# Patient Record
Sex: MALE | Race: WHITE | NOT HISPANIC OR LATINO | Employment: OTHER | URBAN - METROPOLITAN AREA
[De-identification: names, ages, dates, MRNs, and addresses within clinical notes are randomized per-mention and may not be internally consistent; named-entity substitution may affect disease eponyms.]

---

## 2018-05-21 ENCOUNTER — INFUSION (OUTPATIENT)
Dept: INFUSION THERAPY | Facility: HOSPITAL | Age: 58
End: 2018-05-21
Attending: INTERNAL MEDICINE
Payer: COMMERCIAL

## 2018-05-21 ENCOUNTER — INITIAL CONSULT (OUTPATIENT)
Dept: HEMATOLOGY/ONCOLOGY | Facility: CLINIC | Age: 58
End: 2018-05-21
Payer: COMMERCIAL

## 2018-05-21 VITALS
HEART RATE: 64 BPM | TEMPERATURE: 98 F | SYSTOLIC BLOOD PRESSURE: 151 MMHG | WEIGHT: 243.81 LBS | OXYGEN SATURATION: 98 % | BODY MASS INDEX: 30.31 KG/M2 | HEIGHT: 75 IN | DIASTOLIC BLOOD PRESSURE: 91 MMHG

## 2018-05-21 DIAGNOSIS — D72.820 LYMPHOCYTOSIS: ICD-10-CM

## 2018-05-21 DIAGNOSIS — K40.90 NON-RECURRENT UNILATERAL INGUINAL HERNIA WITHOUT OBSTRUCTION OR GANGRENE: ICD-10-CM

## 2018-05-21 DIAGNOSIS — C91.10 CLL (CHRONIC LYMPHOCYTIC LEUKEMIA): Primary | ICD-10-CM

## 2018-05-21 DIAGNOSIS — D49.89 NEOPLASM OF ABDOMEN: ICD-10-CM

## 2018-05-21 LAB
ALBUMIN SERPL BCP-MCNC: 4.3 G/DL
ALP SERPL-CCNC: 43 U/L
ALT SERPL W/O P-5'-P-CCNC: 20 U/L
ANION GAP SERPL CALC-SCNC: 11 MMOL/L
AST SERPL-CCNC: 19 U/L
BASOPHILS # BLD AUTO: ABNORMAL K/UL
BASOPHILS NFR BLD: 0 %
BILIRUB SERPL-MCNC: 0.6 MG/DL
BUN SERPL-MCNC: 21 MG/DL
CALCIUM SERPL-MCNC: 9.3 MG/DL
CHLORIDE SERPL-SCNC: 107 MMOL/L
CO2 SERPL-SCNC: 22 MMOL/L
CREAT SERPL-MCNC: 1.2 MG/DL
DIFFERENTIAL METHOD: ABNORMAL
EOSINOPHIL # BLD AUTO: ABNORMAL K/UL
EOSINOPHIL NFR BLD: 0 %
ERYTHROCYTE [DISTWIDTH] IN BLOOD BY AUTOMATED COUNT: 13.7 %
EST. GFR  (AFRICAN AMERICAN): >60 ML/MIN/1.73 M^2
EST. GFR  (NON AFRICAN AMERICAN): >60 ML/MIN/1.73 M^2
GLUCOSE SERPL-MCNC: 100 MG/DL
HCT VFR BLD AUTO: 42.4 %
HGB BLD-MCNC: 14.2 G/DL
LDH SERPL L TO P-CCNC: 166 U/L
LYMPHOCYTES # BLD AUTO: ABNORMAL K/UL
LYMPHOCYTES NFR BLD: 84 %
MCH RBC QN AUTO: 29.6 PG
MCHC RBC AUTO-ENTMCNC: 33.5 G/DL
MCV RBC AUTO: 89 FL
MONOCYTES # BLD AUTO: ABNORMAL K/UL
MONOCYTES NFR BLD: 6 %
NEUTROPHILS NFR BLD: 10 %
PLATELET # BLD AUTO: 170 K/UL
PLATELET BLD QL SMEAR: ABNORMAL
PMV BLD AUTO: 11.1 FL
POTASSIUM SERPL-SCNC: 3.9 MMOL/L
PROT SERPL-MCNC: 6.6 G/DL
RBC # BLD AUTO: 4.79 M/UL
SMUDGE CELLS BLD QL SMEAR: PRESENT
SODIUM SERPL-SCNC: 140 MMOL/L
URATE SERPL-MCNC: 6.8 MG/DL
WBC # BLD AUTO: 60.8 K/UL

## 2018-05-21 PROCEDURE — 88185 FLOWCYTOMETRY/TC ADD-ON: CPT | Performed by: PATHOLOGY

## 2018-05-21 PROCEDURE — 83615 LACTATE (LD) (LDH) ENZYME: CPT | Mod: PO

## 2018-05-21 PROCEDURE — 36415 COLL VENOUS BLD VENIPUNCTURE: CPT | Mod: PO

## 2018-05-21 PROCEDURE — 80053 COMPREHEN METABOLIC PANEL: CPT | Mod: PO

## 2018-05-21 PROCEDURE — 85060 BLOOD SMEAR INTERPRETATION: CPT | Mod: ,,, | Performed by: PATHOLOGY

## 2018-05-21 PROCEDURE — 84550 ASSAY OF BLOOD/URIC ACID: CPT | Mod: PO

## 2018-05-21 PROCEDURE — 83010 ASSAY OF HAPTOGLOBIN QUANT: CPT

## 2018-05-21 PROCEDURE — 85027 COMPLETE CBC AUTOMATED: CPT | Mod: PO

## 2018-05-21 PROCEDURE — 88184 FLOWCYTOMETRY/ TC 1 MARKER: CPT | Performed by: PATHOLOGY

## 2018-05-21 PROCEDURE — 84165 PROTEIN E-PHORESIS SERUM: CPT

## 2018-05-21 PROCEDURE — 85007 BL SMEAR W/DIFF WBC COUNT: CPT | Mod: PO

## 2018-05-21 PROCEDURE — 99999 PR PBB SHADOW E&M-NEW PATIENT-LVL III: CPT | Mod: PBBFAC,,, | Performed by: INTERNAL MEDICINE

## 2018-05-21 PROCEDURE — 88189 FLOWCYTOMETRY/READ 16 & >: CPT | Mod: ,,, | Performed by: PATHOLOGY

## 2018-05-21 PROCEDURE — 99203 OFFICE O/P NEW LOW 30 MIN: CPT | Mod: S$GLB,,, | Performed by: INTERNAL MEDICINE

## 2018-05-21 NOTE — PROGRESS NOTES
Subjective:       Patient ID: Rocky Esquivel is a 58 y.o. male.    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 prior to inguinal hernia surgery of greater than 57,000 as well as all the patient urgently for further evaluation    Past Medical History:   Diagnosis Date    Allergy     as a child     Family History   Problem Relation Age of Onset    No Known Problems Mother     Colon cancer Father     No Known Problems Sister     No Known Problems Brother     No Known Problems Maternal Aunt     No Known Problems Maternal Uncle     No Known Problems Paternal Aunt     No Known Problems Paternal Uncle     Liver cancer Paternal Grandmother      Social History     Social History    Marital status:      Spouse name: N/A    Number of children: N/A    Years of education: N/A     Occupational History    Not on file.     Social History Main Topics    Smoking status: Former Smoker     Types: Cigarettes     Quit date: 2007    Smokeless tobacco: Not on file    Alcohol use 3.0 oz/week     3 Cans of beer, 2 Shots of liquor per week    Drug use: No    Sexual activity: Yes     Partners: Female     Birth control/ protection: See Surgical Hx     Other Topics Concern    Not on file     Social History Narrative    No narrative on file     Past Surgical History:   Procedure Laterality Date    COLONOSCOPY      2017    NASAL SEPTUM SURGERY  1988    TONSILLECTOMY      1972 approx    VASECTOMY  2003       Labs:  Lab Results   Component Value Date    WBC 60.80 (HH) 05/21/2018    HGB 14.2 05/21/2018    HCT 42.4 05/21/2018    MCV 89 05/21/2018     05/21/2018     BMP  No results found for: NA, K, CL, CO2, BUN, CREATININE, CALCIUM, ANIONGAP, ESTGFRAFRICA, EGFRNONAA  No results found for: ALT, AST, GGT, ALKPHOS, BILITOT    No results found for: IRON, TIBC, FERRITIN, SATURATEDIRO  No results found for: [id number]  No results found for:  FOLATE  No results found for: TSH      Review of Systems   Constitutional: Negative for activity change, appetite change, chills, diaphoresis, fatigue, fever and unexpected weight change.   HENT: Negative for congestion, dental problem, drooling, ear discharge, ear pain, facial swelling, hearing loss, mouth sores, nosebleeds, postnasal drip, rhinorrhea, sinus pressure, sneezing, sore throat, tinnitus, trouble swallowing and voice change.    Eyes: Negative for photophobia, pain, discharge, redness, itching and visual disturbance.   Respiratory: Negative for apnea, cough, choking, chest tightness, shortness of breath, wheezing and stridor.    Cardiovascular: Negative for chest pain, palpitations and leg swelling.   Gastrointestinal: Positive for abdominal distention and abdominal pain. Negative for anal bleeding, blood in stool, constipation, diarrhea, nausea, rectal pain and vomiting.   Endocrine: Negative for cold intolerance, heat intolerance, polydipsia, polyphagia and polyuria.   Genitourinary: Negative for decreased urine volume, difficulty urinating, discharge, dysuria, enuresis, flank pain, frequency, genital sores, hematuria, penile pain, penile swelling, scrotal swelling, testicular pain and urgency.   Musculoskeletal: Negative for arthralgias, back pain, gait problem, joint swelling, myalgias, neck pain and neck stiffness.   Skin: Negative for color change, pallor, rash and wound.   Allergic/Immunologic: Negative for environmental allergies, food allergies and immunocompromised state.   Neurological: Negative for dizziness, tremors, seizures, syncope, facial asymmetry, speech difficulty, weakness, light-headedness, numbness and headaches.   Hematological: Negative for adenopathy. Does not bruise/bleed easily.   Psychiatric/Behavioral: Negative for agitation, behavioral problems, confusion, decreased concentration, dysphoric mood, hallucinations, self-injury, sleep disturbance and suicidal ideas. The patient  is not nervous/anxious and is not hyperactive.        Objective:      Physical Exam   Constitutional: He is oriented to person, place, and time. He appears well-developed and well-nourished. He appears distressed.   HENT:   Head: Normocephalic.   Right Ear: Tympanic membrane and external ear normal.   Left Ear: Tympanic membrane and external ear normal.   Nose: Nose normal. Right sinus exhibits no maxillary sinus tenderness and no frontal sinus tenderness. Left sinus exhibits no maxillary sinus tenderness and no frontal sinus tenderness.   Mouth/Throat: Oropharynx is clear and moist. No oropharyngeal exudate.   Eyes: EOM and lids are normal. Pupils are equal, round, and reactive to light. Right eye exhibits no discharge. Left eye exhibits no discharge. Right conjunctiva is not injected. Right conjunctiva has no hemorrhage. Left conjunctiva is not injected. Left conjunctiva has no hemorrhage. No scleral icterus. Right eye exhibits normal extraocular motion. Left eye exhibits normal extraocular motion.   Neck: Normal range of motion. Neck supple. No JVD present. No tracheal deviation present. No thyromegaly present.   Cardiovascular: Normal rate, regular rhythm and normal heart sounds.    Pulmonary/Chest: Effort normal and breath sounds normal. No stridor. No respiratory distress.   Abdominal: Soft. Bowel sounds are normal. He exhibits distension. He exhibits no mass. There is no hepatosplenomegaly, splenomegaly or hepatomegaly. There is tenderness.   Spleen tip palpable   Musculoskeletal: Normal range of motion. He exhibits no edema or tenderness.   Lymphadenopathy:        Head (right side): No posterior auricular and no occipital adenopathy present.        Head (left side): No posterior auricular and no occipital adenopathy present.     He has no cervical adenopathy.        Right cervical: No superficial cervical, no deep cervical and no posterior cervical adenopathy present.       Left cervical: No superficial  cervical, no deep cervical and no posterior cervical adenopathy present.     He has no axillary adenopathy.        Right: No supraclavicular adenopathy present.        Left: No supraclavicular adenopathy present.   Neurological: He is alert and oriented to person, place, and time. He has normal strength. No cranial nerve deficit. Coordination normal.   Skin: Skin is dry. No rash noted. He is not diaphoretic. No cyanosis or erythema. Nails show no clubbing.   Psychiatric: His behavior is normal. Judgment and thought content normal. His mood appears anxious. Cognition and memory are normal. He exhibits a depressed mood.   Vitals reviewed.          Assessment:      1. CLL (chronic lymphocytic leukemia)    2. Neoplasm of abdomen    3. Non-recurrent unilateral inguinal hernia without obstruction or gangrene           Plan:   Repeat CBC white count greater than 60,080% lymphs high likelihood of chronic lymphocytic leukemia laboratory evaluation in progress.  Discussed with he and his wife by phone the diagnosis of resumption of CLL articles from up-to-date followed to them for their review I will see patient back after laboratory results return as well as CT chest abdomen pelvis for review talked about the possibility of cause of hernia may be from lymphadenopathy in retroperitoneum especially with palpable spleen

## 2018-05-22 LAB
HAPTOGLOB SERPL-MCNC: 79 MG/DL
PATH REV BLD -IMP: NORMAL

## 2018-05-22 PROCEDURE — 84165 PROTEIN E-PHORESIS SERUM: CPT | Mod: 26,,, | Performed by: PATHOLOGY

## 2018-05-23 ENCOUNTER — HOSPITAL ENCOUNTER (OUTPATIENT)
Dept: RADIOLOGY | Facility: HOSPITAL | Age: 58
Discharge: HOME | End: 2018-05-23
Attending: INTERNAL MEDICINE
Payer: COMMERCIAL

## 2018-05-23 ENCOUNTER — PATIENT MESSAGE (OUTPATIENT)
Dept: HEMATOLOGY/ONCOLOGY | Facility: CLINIC | Age: 58
End: 2018-05-23

## 2018-05-23 ENCOUNTER — OFFICE VISIT (OUTPATIENT)
Dept: HEMATOLOGY/ONCOLOGY | Facility: CLINIC | Age: 58
End: 2018-05-23
Payer: COMMERCIAL

## 2018-05-23 ENCOUNTER — INFUSION (OUTPATIENT)
Dept: INFUSION THERAPY | Facility: HOSPITAL | Age: 58
End: 2018-05-23
Attending: INTERNAL MEDICINE
Payer: COMMERCIAL

## 2018-05-23 ENCOUNTER — SOCIAL WORK (OUTPATIENT)
Dept: HEMATOLOGY/ONCOLOGY | Facility: CLINIC | Age: 58
End: 2018-05-23

## 2018-05-23 VITALS
SYSTOLIC BLOOD PRESSURE: 122 MMHG | WEIGHT: 237.63 LBS | BODY MASS INDEX: 29.55 KG/M2 | HEIGHT: 75 IN | OXYGEN SATURATION: 98 % | HEART RATE: 75 BPM | TEMPERATURE: 98 F | RESPIRATION RATE: 18 BRPM | DIASTOLIC BLOOD PRESSURE: 84 MMHG

## 2018-05-23 DIAGNOSIS — D49.89 NEOPLASM OF ABDOMEN: ICD-10-CM

## 2018-05-23 DIAGNOSIS — C91.10 CLL (CHRONIC LYMPHOCYTIC LEUKEMIA): Primary | ICD-10-CM

## 2018-05-23 DIAGNOSIS — C91.10 CLL (CHRONIC LYMPHOCYTIC LEUKEMIA): ICD-10-CM

## 2018-05-23 DIAGNOSIS — K40.90 NON-RECURRENT UNILATERAL INGUINAL HERNIA WITHOUT OBSTRUCTION OR GANGRENE: ICD-10-CM

## 2018-05-23 PROBLEM — D72.820 LYMPHOCYTOSIS: Status: RESOLVED | Noted: 2018-05-21 | Resolved: 2018-05-23

## 2018-05-23 LAB
ALBUMIN SERPL ELPH-MCNC: 4.48 G/DL
ALPHA1 GLOB SERPL ELPH-MCNC: 0.23 G/DL
ALPHA2 GLOB SERPL ELPH-MCNC: 0.51 G/DL
B-GLOBULIN SERPL ELPH-MCNC: 0.59 G/DL
FLOW CYTOMETRY ANTIBODIES ANALYZED - BLOOD: NORMAL
FLOW CYTOMETRY COMMENT - BLOOD: NORMAL
FLOW CYTOMETRY INTERPRETATION - BLOOD: NORMAL
GAMMA GLOB SERPL ELPH-MCNC: 0.7 G/DL
PATHOLOGIST INTERPRETATION SPE: NORMAL
PROT SERPL-MCNC: 6.5 G/DL

## 2018-05-23 PROCEDURE — 71260 CT THORAX DX C+: CPT | Mod: TC,PO

## 2018-05-23 PROCEDURE — 3008F BODY MASS INDEX DOCD: CPT | Mod: CPTII,S$GLB,, | Performed by: INTERNAL MEDICINE

## 2018-05-23 PROCEDURE — 99215 OFFICE O/P EST HI 40 MIN: CPT | Mod: S$GLB,,, | Performed by: INTERNAL MEDICINE

## 2018-05-23 PROCEDURE — 74178 CT ABD&PLV WO CNTR FLWD CNTR: CPT | Mod: TC,PO

## 2018-05-23 PROCEDURE — 25500020 PHARM REV CODE 255: Mod: PO | Performed by: INTERNAL MEDICINE

## 2018-05-23 PROCEDURE — 74178 CT ABD&PLV WO CNTR FLWD CNTR: CPT | Mod: 26,,, | Performed by: RADIOLOGY

## 2018-05-23 PROCEDURE — 36415 COLL VENOUS BLD VENIPUNCTURE: CPT | Mod: PO

## 2018-05-23 PROCEDURE — 88271 CYTOGENETICS DNA PROBE: CPT

## 2018-05-23 PROCEDURE — 99999 PR PBB SHADOW E&M-EST. PATIENT-LVL III: CPT | Mod: PBBFAC,,, | Performed by: INTERNAL MEDICINE

## 2018-05-23 PROCEDURE — 71260 CT THORAX DX C+: CPT | Mod: 26,,, | Performed by: RADIOLOGY

## 2018-05-23 RX ORDER — ZOLPIDEM TARTRATE 5 MG/1
5 TABLET ORAL NIGHTLY PRN
Qty: 30 TABLET | Refills: 0 | Status: SHIPPED | OUTPATIENT
Start: 2018-05-23 | End: 2018-06-29 | Stop reason: SDUPTHER

## 2018-05-23 RX ADMIN — IOHEXOL 30 ML: 350 INJECTION, SOLUTION INTRAVENOUS at 07:05

## 2018-05-23 RX ADMIN — IOHEXOL 100 ML: 350 INJECTION, SOLUTION INTRAVENOUS at 08:05

## 2018-05-23 NOTE — PROGRESS NOTES
SW met with pt and his wife after his visit with oncologist who recommended scheduling an appt with Dr. Rich. SW made introductions and mentioned she would meet them for further needs assessment when they return to the clinic. Pt was agreeable to a telepsych visit on the first Thursday 2:30 time slot. SW scheduled pt for next Thursday, May 31 at 2:30 pm. SW will plan to f/u with pt when he RTC.

## 2018-05-23 NOTE — PROGRESS NOTES
Subjective:       Patient ID: Rocky Esquivel is a 58 y.o. male.    Chief Complaint: Follow-up (Chronic lymphocytic leukemia) and Results    HPI 58-year-old male returns after CT chest abdomen pelvis and for review of laboratories for marked lymphocytosis    Past Medical History:   Diagnosis Date    Allergy     as a child     Family History   Problem Relation Age of Onset    No Known Problems Mother     Colon cancer Father     No Known Problems Sister     No Known Problems Brother     No Known Problems Maternal Aunt     No Known Problems Maternal Uncle     No Known Problems Paternal Aunt     No Known Problems Paternal Uncle     Liver cancer Paternal Grandmother      Social History     Social History    Marital status:      Spouse name: N/A    Number of children: N/A    Years of education: N/A     Occupational History    Not on file.     Social History Main Topics    Smoking status: Former Smoker     Types: Cigarettes     Quit date: 2007    Smokeless tobacco: Not on file    Alcohol use 3.0 oz/week     3 Cans of beer, 2 Shots of liquor per week    Drug use: No    Sexual activity: Yes     Partners: Female     Birth control/ protection: See Surgical Hx     Other Topics Concern    Not on file     Social History Narrative    No narrative on file     Past Surgical History:   Procedure Laterality Date    COLONOSCOPY      2017    NASAL SEPTUM SURGERY  1988    TONSILLECTOMY      1972 approx    VASECTOMY  2003       Labs:  Lab Results   Component Value Date    WBC 60.80 (HH) 05/21/2018    HGB 14.2 05/21/2018    HCT 42.4 05/21/2018    MCV 89 05/21/2018     05/21/2018     BMP  Lab Results   Component Value Date     05/21/2018    K 3.9 05/21/2018     05/21/2018    CO2 22 (L) 05/21/2018    BUN 21 (H) 05/21/2018    CREATININE 1.2 05/21/2018    CALCIUM 9.3 05/21/2018    ANIONGAP 11 05/21/2018    ESTGFRAFRICA >60 05/21/2018    EGFRNONAA >60 05/21/2018     Lab Results    Component Value Date    ALT 20 05/21/2018    AST 19 05/21/2018    ALKPHOS 43 (L) 05/21/2018    BILITOT 0.6 05/21/2018       No results found for: IRON, TIBC, FERRITIN, SATURATEDIRO  No results found for: [id number]  No results found for: FOLATE  No results found for: TSH      Review of Systems   Constitutional: Positive for activity change and fatigue. Negative for appetite change, chills, diaphoresis, fever and unexpected weight change.   HENT: Negative for congestion, dental problem, drooling, ear discharge, ear pain, facial swelling, hearing loss, mouth sores, nosebleeds, postnasal drip, rhinorrhea, sinus pressure, sneezing, sore throat, tinnitus, trouble swallowing and voice change.    Eyes: Negative for photophobia, pain, discharge, redness, itching and visual disturbance.   Respiratory: Negative for apnea, cough, choking, chest tightness, shortness of breath, wheezing and stridor.    Cardiovascular: Negative for chest pain, palpitations and leg swelling.   Gastrointestinal: Negative for abdominal distention, abdominal pain, anal bleeding, blood in stool, constipation, diarrhea, nausea, rectal pain and vomiting.   Endocrine: Negative for cold intolerance, heat intolerance, polydipsia, polyphagia and polyuria.   Genitourinary: Negative for decreased urine volume, difficulty urinating, discharge, dysuria, enuresis, flank pain, frequency, genital sores, hematuria, penile pain, penile swelling, scrotal swelling, testicular pain and urgency.   Musculoskeletal: Negative for arthralgias, back pain, gait problem, joint swelling, myalgias, neck pain and neck stiffness.   Skin: Negative for color change, pallor, rash and wound.   Allergic/Immunologic: Negative for environmental allergies, food allergies and immunocompromised state.   Neurological: Positive for weakness. Negative for dizziness, tremors, seizures, syncope, facial asymmetry, speech difficulty, light-headedness, numbness and headaches.   Hematological:  Negative for adenopathy. Does not bruise/bleed easily.   Psychiatric/Behavioral: Positive for dysphoric mood and sleep disturbance. Negative for agitation, behavioral problems, confusion, decreased concentration, hallucinations, self-injury and suicidal ideas. The patient is nervous/anxious. The patient is not hyperactive.        Objective:      Physical Exam   Constitutional: He is oriented to person, place, and time. He appears well-developed and well-nourished. He appears distressed.   HENT:   Head: Normocephalic.   Right Ear: External ear normal.   Left Ear: External ear normal.   Nose: Nose normal. Right sinus exhibits no maxillary sinus tenderness and no frontal sinus tenderness. Left sinus exhibits no maxillary sinus tenderness and no frontal sinus tenderness.   Mouth/Throat: Oropharynx is clear and moist. No oropharyngeal exudate.   Eyes: EOM and lids are normal. Pupils are equal, round, and reactive to light. Right eye exhibits no discharge. Left eye exhibits no discharge. Right conjunctiva is not injected. Right conjunctiva has no hemorrhage. Left conjunctiva is not injected. Left conjunctiva has no hemorrhage. No scleral icterus. Right eye exhibits normal extraocular motion. Left eye exhibits normal extraocular motion.   Neck: Normal range of motion. Neck supple. No JVD present. No tracheal deviation present. No thyromegaly present.   Cardiovascular: Normal rate, regular rhythm and normal heart sounds.    Pulmonary/Chest: Effort normal and breath sounds normal. No stridor. No respiratory distress.   Abdominal: Soft. Bowel sounds are normal. He exhibits no mass. There is no hepatosplenomegaly, splenomegaly or hepatomegaly. There is no tenderness.   Musculoskeletal: Normal range of motion. He exhibits no edema or tenderness.   Lymphadenopathy:        Head (right side): No posterior auricular and no occipital adenopathy present.        Head (left side): No posterior auricular and no occipital adenopathy  present.     He has no cervical adenopathy.        Right cervical: No superficial cervical, no deep cervical and no posterior cervical adenopathy present.       Left cervical: No superficial cervical, no deep cervical and no posterior cervical adenopathy present.     He has no axillary adenopathy.        Right: No supraclavicular adenopathy present.        Left: No supraclavicular adenopathy present.   Neurological: He is alert and oriented to person, place, and time. He has normal strength. No cranial nerve deficit. Coordination normal.   Skin: Skin is dry. No rash noted. He is not diaphoretic. No cyanosis or erythema. Nails show no clubbing.   Psychiatric: His behavior is normal. Judgment and thought content normal. His mood appears anxious. Cognition and memory are normal. He exhibits a depressed mood.   Vitals reviewed.          Assessment:      1. CLL (chronic lymphocytic leukemia)    2. Non-recurrent unilateral inguinal hernia without obstruction or gangrene           Plan:   Review of imaging studies demonstrates marked lymphadenopathy throughout his abdomen and marked splenomegaly.  The abnormalities noted in the lung and liver.  Incidental extensive conversation B-cell lymphoproliferative disorder consistent with CLL/small lymphocytic lymphoma reviewed reports result of CLL FISH pending.  At this point will see back after results CLL FISH for treatment recommendations but most likely will need some sort of treatment discussed possibility of travel to [facility] for 2nd opinion.  Important to have information from fish analysis to determine true recommendations to see whether not of research trials are open at the present time.  Ambulatory referral made  and patient will be scheduled for evaluation by psychologist.  Discussed implications with he and his wife present information from today followed by them as well. Psychosocial Distress screening score of Distress Score: 10 noted and  reviewed.  Ambulatory referral  discussed case with surgery Dr. Patricio Bernabe we will hold treatment for hernia and less clinically indicated low risk of incarceration

## 2018-05-23 NOTE — NURSING
Lab obtained from right AC, 2 patient identifiers obtained, labeled and sent to lab.  Adequate blood return noted.  Dressing changed via sterile technique. Site without redness, swelling or drainage noted.

## 2018-05-23 NOTE — PROGRESS NOTES
Distress Screening Results: Psychosocial Distress screening score of Distress Score: 10 {AMB ONC DISTRESS SCORE:76458}

## 2018-05-25 ENCOUNTER — PATIENT MESSAGE (OUTPATIENT)
Dept: HEMATOLOGY/ONCOLOGY | Facility: CLINIC | Age: 58
End: 2018-05-25

## 2018-05-30 ENCOUNTER — TELEPHONE (OUTPATIENT)
Dept: HEMATOLOGY/ONCOLOGY | Facility: CLINIC | Age: 58
End: 2018-05-30

## 2018-05-30 NOTE — TELEPHONE ENCOUNTER
Voiced understanding that results are not in and I rescheduled for Friday but may call before depending on results. Reminded of tele appt tomorrow at [facility]. Voiced understanding and gratitude.

## 2018-05-30 NOTE — TELEPHONE ENCOUNTER
----- Message from Rosy He sent at 5/30/2018 11:23 AM CDT -----  Contact: Pt  Please give pt a call at [phone] he was returning the nurse call.

## 2018-05-31 ENCOUNTER — TELEPHONE (OUTPATIENT)
Dept: HEMATOLOGY/ONCOLOGY | Facility: CLINIC | Age: 58
End: 2018-05-31

## 2018-05-31 ENCOUNTER — OFFICE VISIT (OUTPATIENT)
Dept: PSYCHIATRY | Facility: CLINIC | Age: 58
End: 2018-05-31
Payer: COMMERCIAL

## 2018-05-31 DIAGNOSIS — F51.02 ADJUSTMENT INSOMNIA: Primary | ICD-10-CM

## 2018-05-31 LAB
CLINICAL CYTOGENETICIST REVIEW: NORMAL
CLL GENE MUT ANL BLD/T: NORMAL
CLL, FISH ADDITIONAL INFORMATION: NORMAL
CLL, FISH DISCLAIMER: NORMAL
CLL, FISH RELEASED BY: NORMAL
CLL, FISH RESULT SUMMARY: NORMAL
CLL, FISH RESULT TABLE: NORMAL
FCLL REASON FOR REFERRAL: NORMAL
FCLL SPECIMEN: NORMAL
REF LAB TEST METHOD: NORMAL
SPECIMEN SOURCE: NORMAL

## 2018-05-31 PROCEDURE — 90791 PSYCH DIAGNOSTIC EVALUATION: CPT | Mod: GT,S$GLB,, | Performed by: PSYCHOLOGIST

## 2018-05-31 NOTE — PROGRESS NOTES
TELEMEDICINE PSYCHO-ONCOLOGY INTAKE    Consultation started: 5/31/2018 at 2:26 PM   The chief complaint leading to consultation is: adaptation to disease, insomnia, anxiety  The patient location is: [facility]cology     Diagnostic Interview - CPT 29631    Date: 5/31/2018  Site of Clinician: [facility]     Evaluation Length (direct face-to-face time):  30 minutes     Referral Source: Oncologist: José Manuel   PCP: Primary Doctor No    Clinical status of patient: Outpatient    Rocky Esquivel, a 58 y.o. male, seen for initial evaluation visit.  Met with patient.    Chief complaint/reason for encounter: adjustment to illness, anxiety and sleep    Medical/Surgical History:    Patient Active Problem List   Diagnosis    CLL (chronic lymphocytic leukemia)    Non-recurrent unilateral inguinal hernia    Adjustment insomnia       Health Behaviors:       ETOH Use: Yes (social and within NIAAA healthy use limits for age/gender)    Tobacco Use: No   Illicit Drug Use:  No     Prescription Misuse:No   Caffeine: minimal   Exercise:The patient maintains a regular, healthy exercise program.  (walking 1 hour 7x week, prior biking and weight lifting- pending approval by Dr. Georges)      Family History:   Psychiatric illness: Yes  (MGM- SZ, mother- VAN, daughter-bipolar)   Alcohol/Drug Abuse: No     Suicide: No      Past Psychiatric History:   Inpatient treatment: No     Outpatient treatment: Yes (prior marital therapy)    Prior substance abuse treatment: No     Suicide Attempts: No     Psychotropic Medications:    Current: Ambien; uses inderal for public speaking, occasional Xanax     Past:  none   Current medications below, allergies reviewed in chart.  Current Outpatient Prescriptions   Medication    zolpidem (AMBIEN) 5 MG Tab     No current facility-administered medications for this visit.         CAM Therapies: None     Screening: Depression: Denies  (Standardized depression screening used- PHQ-9= 5, does not  meet screener)     Claudia: Denies Psychosis: Denies    Panic Disorder: Denies    Social/specific phobia: Denies       Social situation/Stressors: Rocky Esquivel lives with his wife a 2 of his children in [place], [place].  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 Rocky Esquivel has been  1x (32 years) and has 4 children (18-28).  His spouse is Heena.  He has 4 siblings (3 sisters, 1 brother) with whom he occasionally spends time. The patient reports emotional support only from his wife.  He has a number of friends, but does not rely on them or his siblings for support.  Rocky Esquivel is a non-theist.  Rocky Esquivel's hobbies include gardening and travel.  Additional stressors:  None reported    Strengths:Steady employment, financial stability, Housing stability, Able to vocalize needs, Motivation, readiness for change, Setting and pursuing goals, hopes, dreams, aspirations, Resources - social, interpersonal, monetary and Vocational interests, hobbies and/or talents  Liabilities: Complicated medical illness    Current Evaluation:   Mental Status Exam: Rocky Esquivel arrived promptly for the assessment session. The patient was fully cooperative throughout the interview and was an adequate historian   Appearance: age appropriate,  casually dressed, well groomed  Behavior/Cooperation: friendly and cooperative  Speech:normal in rate, volume, and tone   Mood: euthymic  Affect: mood congruent, full range and appropriate  Thought Process: goal-directed, logical  Thought Content: normal, no suicidality, no homicidality, delusions, or paranoia;did not appear to be responding to internal stimuli during the interview.   Orientation: grossly intact  Memory: Grossly intact  Attention Span/Concentration: Attends to interview without distraction; reports subjective difficulty  Fund of Knowledge: average  Estimate of Intelligence: average from verbal skills  "and history  Cognition: grossly intact  Insight: patient has awareness of illness; good insight into own behavior and behavior of others  Judgment: the patient's behavior is adequate to circumstances    Distress Score    Distress Score: 7        Practical Problems Physical Problems                                                   Family Problems                                         Emotional Problems              Fears: Yes       Nervousness: Yes       Sadness: Yes      Worry: Yes        Sleep: Yes          Spiritual/Religions Concerns               Other Problems            MMSE:     Pain: 0    History of present illness: 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  Rocky Esquivel has adjusted to illness fairly well after an initial period of shock and extreme anxiety primarily through active coping strategies.  He is experiencing some lingering anxiety while he waits for his CLL FISH.  He reports some feelings of embarrassment ("didn't plan to be the defective one who got the illness").  He has engaged in appropriate information gathering. He reports he is "ready to start treatment." The patient has satisfactory family/friend support.  His support system is coping very well with the diagnosis. Illness related psychosocial stressors include absence from work.  The patient has an adequate and growing partnership with his [facility] onc[facility] treatment team. The patient reports the following barriers to cancer care: none. He is actively searching for medical and psychosocial resources.  He has changed his diet to "better combat the cancer."    Symptoms:   · Mood: depressed mood, insomnia, worthlessness/guilt and poor concentration ; no prior, no life interference;  PHQ-9= 5  · Anxiety: excessive anxiety/worry, restlessness/keyed up, irritability and muscle tension; prior anxiety about children, practice; not life interfering, anxieyt fading as he becomes adjusted to " diagnosis, remainder of anxiety centered around decision-making when CLL FISH comes back; history of limited symptom panic attacks when public speaking (uses inderal to control- no avoidance); VAN-7= 7  · Substance abuse: denied  · Cognitive functioning: denied  · Health behaviors: noncontributory  · Sleep: onset and maintenance problems since dx, decreasing already, good sleep last night without Ambien      Assessment - Diagnosis - Goals:       ICD-10-CM ICD-9-CM   1. Adjustment insomnia F51.02 307.41       Plan: no intervention required at the present time    Summary and Recommendations  Rocky Esquivel is a 58 y.o. male referred by Dr. Georges for psychological evaluation and treatment.  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  Mood protective strategies during cancer treatment were discussed.  Patient was given information about evidence-based cancer nutrition online webinars.  He was encouraged to continue with pleasant events scheduling and his typical exercise pattern.  Patient will alert medical team if anxiety/insomnia does not resolve with definitive treatment plan or if depressed mood/anxiety increase in the future.  There are no recommendations for psychological treatment at this time. The patient is aware of resources available should his needs change in the future.    Each patient to whom he or she provides medical services by telemedicine is:  (1) informed of the relationship between the physician and patient and the respective role of any other health care provider with respect to management of the patient; and (2) notified that he or she may decline to receive medical services by telemedicine and may withdraw from such care at any time      Gonzalo Brower, PhD  Clinical Psychologist  [place] License #[id number]

## 2018-05-31 NOTE — TELEPHONE ENCOUNTER
Tell him as soon as I get will call; none yet    Previous Messages         Attempted to contact the following pt. ap

## 2018-05-31 NOTE — LETTER
May 31, 2018        Shakeel Georges MD  [address] [address]             [facility]  [address] [facility] [address]  Phone: [phone]  Fax: [phone]   Patient: Rocky Esquivel   MR Number: [id number]   YOB: 1960   Date of Visit: 5/31/2018       Dear Dr. Georges:    Thank you for referring Rocky Esquivel to me for evaluation. Below are the relevant portions of my assessment and plan of care.     Rocky Esquivel is a 58 y.o. male referred by Dr. Georges for psychological evaluation and treatment.  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  Mood protective strategies during cancer treatment were discussed.  Patient was given information about evidence-based cancer nutrition online webinars.  He was encouraged to continue with pleasant events scheduling and his typical exercise pattern.  Patient will alert medical team if anxiety/insomnia does not resolve with definitive treatment plan or if depressed mood/anxiety increase in the future.  There are no recommendations for psychological treatment at this time. The patient is aware of resources available should his needs change in the future.     If you have questions, please do not hesitate to call me. I look forward to following Rocky along with you.    Sincerely,      Gonzalo Rich, PhD           CC  No Recipients

## 2018-06-01 ENCOUNTER — OFFICE VISIT (OUTPATIENT)
Dept: HEMATOLOGY/ONCOLOGY | Facility: CLINIC | Age: 58
End: 2018-06-01
Payer: COMMERCIAL

## 2018-06-01 VITALS
HEART RATE: 51 BPM | TEMPERATURE: 97 F | SYSTOLIC BLOOD PRESSURE: 120 MMHG | WEIGHT: 231.25 LBS | DIASTOLIC BLOOD PRESSURE: 73 MMHG | OXYGEN SATURATION: 96 % | HEIGHT: 75 IN | BODY MASS INDEX: 28.75 KG/M2

## 2018-06-01 DIAGNOSIS — C91.10 CLL (CHRONIC LYMPHOCYTIC LEUKEMIA): Primary | ICD-10-CM

## 2018-06-01 PROCEDURE — 99215 OFFICE O/P EST HI 40 MIN: CPT | Mod: S$GLB,,, | Performed by: INTERNAL MEDICINE

## 2018-06-01 PROCEDURE — 99999 PR PBB SHADOW E&M-EST. PATIENT-LVL III: CPT | Mod: PBBFAC,,, | Performed by: INTERNAL MEDICINE

## 2018-06-01 PROCEDURE — 3008F BODY MASS INDEX DOCD: CPT | Mod: CPTII,S$GLB,, | Performed by: INTERNAL MEDICINE

## 2018-06-01 RX ORDER — PROPRANOLOL HYDROCHLORIDE 40 MG/1
TABLET ORAL
COMMUNITY
Start: 2018-05-19 | End: 2018-06-29

## 2018-06-01 NOTE — PROGRESS NOTES
Subjective:       Patient ID: Rocky Esquivel is a 58 y.o. male.    Chief Complaint: Results and Leukemia    HPI 58-year-old male returns for review of CLL FISH profile recently diagnosed with CLL.  The patient has some abdominal discomfort recent diagnosis of direct right inguinal hernia.  Accompanied by patient's wife ECOG status 1     Past Medical History:   Diagnosis Date    Allergy     as a child    Anxiety     Therapy     marital therapy     Family History   Problem Relation Age of Onset    Anxiety disorder Mother     Colon cancer Father     No Known Problems Sister     No Known Problems Brother     No Known Problems Maternal Aunt     No Known Problems Maternal Uncle     No Known Problems Paternal Aunt     No Known Problems Paternal Uncle     Liver cancer Paternal Grandmother     Schizophrenia Maternal Grandmother     Bipolar disorder Daughter      Social History     Social History    Marital status:      Spouse name: Heena     Number of children: 4    Years of education: N/A     Occupational History    Not on file.     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, orthodontist     Past Surgical History:   Procedure Laterality Date    COLONOSCOPY      2017    NASAL SEPTUM SURGERY  1988    TONSILLECTOMY      1972 approx    VASECTOMY  2003       Labs:  Lab Results   Component Value Date    WBC 60.80 (HH) 05/21/2018    HGB 14.2 05/21/2018    HCT 42.4 05/21/2018    MCV 89 05/21/2018     05/21/2018     BMP  Lab Results   Component Value Date     05/21/2018    K 3.9 05/21/2018      05/21/2018    CO2 22 (L) 05/21/2018    BUN 21 (H) 05/21/2018    CREATININE 1.2 05/21/2018    CALCIUM 9.3 05/21/2018    ANIONGAP 11 05/21/2018    ESTGFRAFRICA >60 05/21/2018    EGFRNONAA >60 05/21/2018     Lab Results   Component Value Date    ALT 20 05/21/2018    AST 19 05/21/2018    ALKPHOS 43 (L) 05/21/2018    BILITOT 0.6 05/21/2018       No results found for: IRON, TIBC, FERRITIN, SATURATEDIRO  No results found for: IWHUMKQD51  No results found for: FOLATE  No results found for: TSH      Review of Systems   Constitutional: Negative for activity change, appetite change, chills, diaphoresis, fatigue, fever and unexpected weight change.   HENT: Negative for congestion, dental problem, drooling, ear discharge, ear pain, facial swelling, hearing loss, mouth sores, nosebleeds, postnasal drip, rhinorrhea, sinus pressure, sneezing, sore throat, tinnitus, trouble swallowing and voice change.    Eyes: Negative for photophobia, pain, discharge, redness, itching and visual disturbance.   Respiratory: Negative for apnea, cough, choking, chest tightness, shortness of breath, wheezing and stridor.    Cardiovascular: Negative for chest pain, palpitations and leg swelling.   Gastrointestinal: Positive for abdominal pain. Negative for abdominal distention, anal bleeding, blood in stool, constipation, diarrhea, nausea, rectal pain and vomiting.   Endocrine: Negative for cold intolerance, heat intolerance, polydipsia, polyphagia and polyuria.   Genitourinary: Negative for decreased urine volume, difficulty urinating, discharge, dysuria, enuresis, flank pain, frequency, genital sores, hematuria, penile pain, penile swelling, scrotal swelling, testicular pain and urgency.   Musculoskeletal: Negative for arthralgias, back pain, gait problem, joint swelling, myalgias, neck pain and neck stiffness.   Skin: Negative for color change, pallor, rash and wound.   Allergic/Immunologic: Negative for environmental allergies, food  allergies and immunocompromised state.   Neurological: Negative for dizziness, tremors, seizures, syncope, facial asymmetry, speech difficulty, weakness, light-headedness, numbness and headaches.   Hematological: Negative for adenopathy. Does not bruise/bleed easily.   Psychiatric/Behavioral: Positive for dysphoric mood. Negative for agitation, behavioral problems, confusion, decreased concentration, hallucinations, self-injury, sleep disturbance and suicidal ideas. The patient is nervous/anxious. The patient is not hyperactive.        Objective:      Physical Exam   Constitutional: He is oriented to person, place, and time. He appears well-developed and well-nourished. He appears distressed.   HENT:   Head: Normocephalic.   Right Ear: External ear normal.   Left Ear: External ear normal.   Nose: Nose normal. Right sinus exhibits no maxillary sinus tenderness and no frontal sinus tenderness. Left sinus exhibits no maxillary sinus tenderness and no frontal sinus tenderness.   Mouth/Throat: Oropharynx is clear and moist. No oropharyngeal exudate.   Eyes: EOM and lids are normal. Pupils are equal, round, and reactive to light. Right eye exhibits no discharge. Left eye exhibits no discharge. Right conjunctiva is not injected. Right conjunctiva has no hemorrhage. Left conjunctiva is not injected. Left conjunctiva has no hemorrhage. No scleral icterus. Right eye exhibits normal extraocular motion. Left eye exhibits normal extraocular motion.   Neck: Normal range of motion. Neck supple. No JVD present. No tracheal deviation present. No thyromegaly present.   Cardiovascular: Normal rate and regular rhythm.    Pulmonary/Chest: Effort normal. No stridor. No respiratory distress.   Abdominal: Soft. He exhibits no mass. There is no hepatosplenomegaly, splenomegaly or hepatomegaly. There is no tenderness.   Musculoskeletal: Normal range of motion. He exhibits no edema or tenderness.   Lymphadenopathy:        Head (right side):  No posterior auricular and no occipital adenopathy present.        Head (left side): No posterior auricular and no occipital adenopathy present.     He has no cervical adenopathy.        Right cervical: No superficial cervical, no deep cervical and no posterior cervical adenopathy present.       Left cervical: No superficial cervical, no deep cervical and no posterior cervical adenopathy present.     He has no axillary adenopathy.        Right: No supraclavicular adenopathy present.        Left: No supraclavicular adenopathy present.   Neurological: He is alert and oriented to person, place, and time. He has normal strength. No cranial nerve deficit. Coordination normal.   Skin: Skin is dry. No rash noted. He is not diaphoretic. No cyanosis or erythema. Nails show no clubbing.   Psychiatric: His behavior is normal. Judgment and thought content normal. His mood appears anxious. Cognition and memory are normal.   Vitals reviewed.          Assessment:      1. 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  There to findings on his CT scans an indeterminate area in his liver which could for the characterized by MRI of his abdomen discuss this with him in addition he has a 5 mm nodule in his right lung previous smoking history in the past will observe at this point for repeat imaging.  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  Of told not think this is reasonable will check CBCs on a every 2 week basis he is aware fever precautions and should he have any symptoms to contact sooner.  In addition he  would like to have 2nd opinions on treatment options off through leukemia Section [facility] were owns and probably MD Najera.  I have told this is certainly fine to do so my recommendation is to probably be consider treatment with FCR because of the fact that it could place the patient in a deeper remission without having continued drug exposure doctor about the pros and cons of this verses did single agent ibrutinib.  Information from today followed to him will await 2nd opinions through consultants in leukemia specialty check CBCs every 2 weeks communicates through portal and patient is aware of fever precautions or symptoms related to his hernia

## 2018-06-04 ENCOUNTER — TELEPHONE (OUTPATIENT)
Dept: HEMATOLOGY/ONCOLOGY | Facility: CLINIC | Age: 58
End: 2018-06-04

## 2018-06-04 ENCOUNTER — PATIENT MESSAGE (OUTPATIENT)
Dept: HEMATOLOGY/ONCOLOGY | Facility: CLINIC | Age: 58
End: 2018-06-04

## 2018-06-04 NOTE — TELEPHONE ENCOUNTER
Needs help getting a sooner appt with Dr Chen. . I spoke with pts wife and she would like a call from Dr Chen's staff.    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 They can be there at 2 on Thursday June 7th or June 8th, Friday anytime. Dr Esquivel will be in [place] on Friday.

## 2018-06-04 NOTE — TELEPHONE ENCOUNTER
----- Message from Cade Silverio sent at 6/4/2018 12:00 PM CDT -----  Contact: Wife Vianey EsquivelEdfsiy-[phone]  Would like to consult with nurse regarding upcoming appointment.  Please call back at [phone].  Md Parviz

## 2018-06-06 ENCOUNTER — TELEPHONE (OUTPATIENT)
Dept: HEMATOLOGY/ONCOLOGY | Facility: CLINIC | Age: 58
End: 2018-06-06

## 2018-06-06 DIAGNOSIS — C91.10 CLL (CHRONIC LYMPHOCYTIC LEUKEMIA): Primary | ICD-10-CM

## 2018-06-06 NOTE — TELEPHONE ENCOUNTER
Spoke to patient.  Informed him that I will schedule labs ordered by Dr Chen to be drawn in [place]. Verbalized understanding      ----- Message from Radha Yee sent at 6/6/2018  9:39 AM CDT -----  Contact: pt  Pt called to speak with nurse Ifrah   Callback#[phone]  Thank You  MAURILIO Yee

## 2018-06-06 NOTE — TELEPHONE ENCOUNTER
Attempted to call patient. Voicemail left informing patient that we will arrange blood work for him in [place] and his appointment with Dr Chen on the 29th is a good date. Asked patient to return call.

## 2018-06-07 ENCOUNTER — LAB VISIT (OUTPATIENT)
Dept: LAB | Facility: HOSPITAL | Age: 58
End: 2018-06-07
Attending: INTERNAL MEDICINE
Payer: COMMERCIAL

## 2018-06-07 ENCOUNTER — TELEPHONE (OUTPATIENT)
Dept: HEMATOLOGY/ONCOLOGY | Facility: CLINIC | Age: 58
End: 2018-06-07

## 2018-06-07 DIAGNOSIS — C91.10 CLL (CHRONIC LYMPHOCYTIC LEUKEMIA): ICD-10-CM

## 2018-06-07 DIAGNOSIS — D72.820 LYMPHOCYTOSIS: ICD-10-CM

## 2018-06-07 LAB
BASOPHILS NFR BLD: 0 %
DIFFERENTIAL METHOD: ABNORMAL
EOSINOPHIL NFR BLD: 0 %
ERYTHROCYTE [DISTWIDTH] IN BLOOD BY AUTOMATED COUNT: 13.9 %
HCT VFR BLD AUTO: 43 %
HGB BLD-MCNC: 14.3 G/DL
LYMPHOCYTES NFR BLD: 94 %
MCH RBC QN AUTO: 29.8 PG
MCHC RBC AUTO-ENTMCNC: 33.3 G/DL
MCV RBC AUTO: 90 FL
MONOCYTES NFR BLD: 0 %
NEUTROPHILS NFR BLD: 6 %
PLATELET # BLD AUTO: 172 K/UL
PMV BLD AUTO: 11.1 FL
RBC # BLD AUTO: 4.8 M/UL
SMUDGE CELLS BLD QL SMEAR: PRESENT
WBC # BLD AUTO: 50.64 K/UL

## 2018-06-07 PROCEDURE — 85007 BL SMEAR W/DIFF WBC COUNT: CPT | Mod: PO

## 2018-06-07 PROCEDURE — 81263 IGH VARI REGIONAL MUTATION: CPT

## 2018-06-07 PROCEDURE — 88184 FLOWCYTOMETRY/ TC 1 MARKER: CPT

## 2018-06-07 PROCEDURE — 85027 COMPLETE CBC AUTOMATED: CPT | Mod: PO

## 2018-06-07 PROCEDURE — 30000890 MAYO MISCELLANEOUS TEST (REFLEX)

## 2018-06-07 PROCEDURE — 36415 COLL VENOUS BLD VENIPUNCTURE: CPT | Mod: PO

## 2018-06-07 PROCEDURE — 88185 FLOWCYTOMETRY/TC ADD-ON: CPT

## 2018-06-07 NOTE — TELEPHONE ENCOUNTER
Spoke to wife. Clarified lab orders placed for today.          ----- Message from Radha Yee sent at 6/7/2018 11:44 AM CDT -----  Contact: pt  Pt wife called to speak with nurse about blood test  And have some questions  Callback#[phone] Vianey  Thank You  MAURILIO Yee

## 2018-06-12 ENCOUNTER — PATIENT MESSAGE (OUTPATIENT)
Dept: HEMATOLOGY/ONCOLOGY | Facility: CLINIC | Age: 58
End: 2018-06-12

## 2018-06-13 LAB — MAYO MISCELLANEOUS RESULT (REF): NORMAL

## 2018-06-21 LAB
BCLL FINAL DIAGNOSIS: NORMAL
BCLL RESULT: NORMAL
BCLL SPECIMEN TYPE: NORMAL

## 2018-06-22 ENCOUNTER — PATIENT MESSAGE (OUTPATIENT)
Dept: HEMATOLOGY/ONCOLOGY | Facility: CLINIC | Age: 58
End: 2018-06-22

## 2018-06-28 ENCOUNTER — PATIENT MESSAGE (OUTPATIENT)
Dept: HEMATOLOGY/ONCOLOGY | Facility: CLINIC | Age: 58
End: 2018-06-28

## 2018-06-29 ENCOUNTER — TELEPHONE (OUTPATIENT)
Dept: HEMATOLOGY/ONCOLOGY | Facility: CLINIC | Age: 58
End: 2018-06-29

## 2018-06-29 ENCOUNTER — INITIAL CONSULT (OUTPATIENT)
Dept: HEMATOLOGY/ONCOLOGY | Facility: CLINIC | Age: 58
End: 2018-06-29
Payer: COMMERCIAL

## 2018-06-29 ENCOUNTER — LAB VISIT (OUTPATIENT)
Dept: LAB | Facility: HOSPITAL | Age: 58
End: 2018-06-29
Attending: INTERNAL MEDICINE
Payer: COMMERCIAL

## 2018-06-29 VITALS
SYSTOLIC BLOOD PRESSURE: 121 MMHG | OXYGEN SATURATION: 95 % | HEART RATE: 57 BPM | HEIGHT: 75 IN | WEIGHT: 229.75 LBS | TEMPERATURE: 98 F | DIASTOLIC BLOOD PRESSURE: 71 MMHG | BODY MASS INDEX: 28.57 KG/M2 | RESPIRATION RATE: 19 BRPM

## 2018-06-29 DIAGNOSIS — C91.10 CLL (CHRONIC LYMPHOCYTIC LEUKEMIA): ICD-10-CM

## 2018-06-29 DIAGNOSIS — K40.90 NON-RECURRENT UNILATERAL INGUINAL HERNIA WITHOUT OBSTRUCTION OR GANGRENE: ICD-10-CM

## 2018-06-29 DIAGNOSIS — C91.10 CLL (CHRONIC LYMPHOCYTIC LEUKEMIA): Primary | ICD-10-CM

## 2018-06-29 LAB
ALBUMIN SERPL BCP-MCNC: 4.2 G/DL
ALP SERPL-CCNC: 42 U/L
ALT SERPL W/O P-5'-P-CCNC: 18 U/L
ANION GAP SERPL CALC-SCNC: 8 MMOL/L
AST SERPL-CCNC: 21 U/L
B2 MICROGLOB SERPL-MCNC: 3 UG/ML
BASOPHILS NFR BLD: 0 %
BILIRUB SERPL-MCNC: 0.6 MG/DL
BUN SERPL-MCNC: 16 MG/DL
CALCIUM SERPL-MCNC: 8.9 MG/DL
CHLORIDE SERPL-SCNC: 106 MMOL/L
CO2 SERPL-SCNC: 26 MMOL/L
CREAT SERPL-MCNC: 1.2 MG/DL
DIFFERENTIAL METHOD: ABNORMAL
EOSINOPHIL NFR BLD: 0.5 %
ERYTHROCYTE [DISTWIDTH] IN BLOOD BY AUTOMATED COUNT: 13.7 %
EST. GFR  (AFRICAN AMERICAN): >60 ML/MIN/1.73 M^2
EST. GFR  (NON AFRICAN AMERICAN): >60 ML/MIN/1.73 M^2
GLUCOSE SERPL-MCNC: 87 MG/DL
HCT VFR BLD AUTO: 42.2 %
HGB BLD-MCNC: 14 G/DL
IGA SERPL-MCNC: 70 MG/DL
IGG SERPL-MCNC: 738 MG/DL
IGM SERPL-MCNC: 25 MG/DL
IMM GRANULOCYTES # BLD AUTO: ABNORMAL K/UL
IMM GRANULOCYTES NFR BLD AUTO: ABNORMAL %
LDH SERPL L TO P-CCNC: 171 U/L
LYMPHOCYTES NFR BLD: 88 %
MCH RBC QN AUTO: 29.9 PG
MCHC RBC AUTO-ENTMCNC: 33.2 G/DL
MCV RBC AUTO: 90 FL
MONOCYTES NFR BLD: 1.5 %
NEUTROPHILS NFR BLD: 9 %
NRBC BLD-RTO: 0 /100 WBC
PLATELET # BLD AUTO: 154 K/UL
PLATELET BLD QL SMEAR: ABNORMAL
PMV BLD AUTO: 11.2 FL
POTASSIUM SERPL-SCNC: 4.3 MMOL/L
PROT SERPL-MCNC: 6.5 G/DL
RBC # BLD AUTO: 4.69 M/UL
SMUDGE CELLS BLD QL SMEAR: PRESENT
SODIUM SERPL-SCNC: 140 MMOL/L
WBC # BLD AUTO: 49.74 K/UL
WBC OTHER NFR BLD MANUAL: 1 %

## 2018-06-29 PROCEDURE — 82232 ASSAY OF BETA-2 PROTEIN: CPT

## 2018-06-29 PROCEDURE — 83615 LACTATE (LD) (LDH) ENZYME: CPT

## 2018-06-29 PROCEDURE — 85007 BL SMEAR W/DIFF WBC COUNT: CPT

## 2018-06-29 PROCEDURE — 99999 PR PBB SHADOW E&M-EST. PATIENT-LVL III: CPT | Mod: PBBFAC,,, | Performed by: INTERNAL MEDICINE

## 2018-06-29 PROCEDURE — 99215 OFFICE O/P EST HI 40 MIN: CPT | Mod: S$GLB,,, | Performed by: INTERNAL MEDICINE

## 2018-06-29 PROCEDURE — 3008F BODY MASS INDEX DOCD: CPT | Mod: CPTII,S$GLB,, | Performed by: INTERNAL MEDICINE

## 2018-06-29 PROCEDURE — 85027 COMPLETE CBC AUTOMATED: CPT

## 2018-06-29 PROCEDURE — 82784 ASSAY IGA/IGD/IGG/IGM EACH: CPT | Mod: 59

## 2018-06-29 PROCEDURE — 80053 COMPREHEN METABOLIC PANEL: CPT

## 2018-06-29 RX ORDER — ZOLPIDEM TARTRATE 5 MG/1
5 TABLET ORAL NIGHTLY PRN
Qty: 30 TABLET | Refills: 0 | Status: SHIPPED | OUTPATIENT
Start: 2018-06-29 | End: 2018-07-03 | Stop reason: SDUPTHER

## 2018-06-29 NOTE — LETTER
July 2, 2018      Shakeel Georges MD  [address] [place] [address]           [facility]  [address]  [facility] [address]  Phone: [phone]          Patient: Rocky Esquivel   MR Number: [id number]   YOB: 1960   Date of Visit: 6/29/2018       Dear Dr. Shakeel Georges:    Thank you for referring Rocky Esquivel to me for evaluation. Attached you will find relevant portions of my assessment and plan of care.    If you have questions, please do not hesitate to call me. I look forward to following Rocky Esquivel along with you.    Sincerely,    Arnel Chen MD    Enclosure  CC:  No Recipients    If you would like to receive this communication electronically, please contact [email or web address] or [phone] to request more information on [employer] Link access.    For providers and/or their staff who would like to refer a patient to [facility], please contact us through our one-stop-shop provider referral line, [facility] , at [phone].    If you feel you have received this communication in error or would no longer like to receive these types of communications, please e-mail [email or web address]

## 2018-06-29 NOTE — PROGRESS NOTES
HEMATOLOGIC MALIGNANCIES CONSULT NOTE    IDENTIFYING STATEMENT   Filiberto Esquivel (FILIBERTO) is a 58 y.o. male with a  of 1960 from [place], referred by Dr. Ketan Georges for evaluation of CLL.     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 He is generally healthy but noticed earlier this year the presence of a right-sided inguinal hernia. He was evaluated for elective repair of this hernia. During his evaluation, his WBC was noted to be very high. He was referred to hematology.    He saw Dr. Georges on 2018 who obatined CBC showing WBC of 60.8. Hemoglobin was 14.2, and platelets 170. Peripheral blood flow cytometry showed a kappa light chain restricted B-cell population with CD19+, CD20+ (dim) and aberrant expression of CD5 and CD23. This was interpreted as consistent with CLL.     He had CT scan of the chest, abdomen, and pelvis with findings as detailed below in this note. He was recommended to start therapy. He also had FISH performed, which showed no abnormalities.     Dr. 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 He began to seek additional opinions. He has seen Dr. Pickens in [place] who recommended no therapy at this time, though he said it was reasonable (per patient).     He was scheduled with me and will see Dr. Gipson at [facility] in [place]. I have obtained IGHV mutation studies and TP53 studies in anticipation of this visit. He has an unmutated IGHV status. No TP53 mutation.     He feels well. He has continued to work through the summer without difficulty. He is planning a trip to [place], [place] to visit some family (including my former med school classmate Dr. Douglas Stubbs).     Past Medical History:   Diagnosis Date    Allergy     as a child    Anxiety     Therapy     marital therapy       Family History   Problem Relation Age of Onset     Anxiety disorder Mother     Colon cancer Father     No Known Problems Sister     No Known Problems Brother     No Known Problems Maternal Aunt     No Known Problems Maternal Uncle     No Known Problems Paternal Aunt     No Known Problems Paternal Uncle     Liver cancer Paternal Grandmother     Schizophrenia Maternal Grandmother     Bipolar disorder Daughter        Social History     Social History    Marital status:      Spouse name: Heena     Number of children: 4    Years of education: N/A     Occupational History    Not on file.     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     No current facility-administered medications on file prior to visit.        ALLERGIES: Review of patient's allergies indicates:  No Known Allergies     ROS:       Review of Systems   Constitutional: Negative for diaphoresis, fatigue, fever and unexpected weight change.   HENT:   Negative for lump/mass and sore throat.    Eyes: Negative for icterus.   Respiratory: Negative for cough and shortness of breath.    Cardiovascular: Negative for chest pain and palpitations.   Gastrointestinal: Negative for abdominal distention, constipation, diarrhea, nausea and vomiting.   Genitourinary: Negative for dysuria and frequency.    Musculoskeletal: Negative for arthralgias, gait problem and myalgias.   Skin: Negative for rash.   Neurological: Negative for dizziness, gait problem and headaches.   Hematological: Negative for adenopathy. Does  "not bruise/bleed easily.   Psychiatric/Behavioral: The patient is not nervous/anxious.        PHYSICAL EXAM:  Vitals:    06/29/18 1304   BP: 121/71   Pulse: (!) 57   Resp: 19   Temp: 98.2 °F (36.8 °C)   TempSrc: Oral   SpO2: 95%   Weight: 104.2 kg (229 lb 11.5 oz)   Height: 6' 3" (1.905 m)   PainSc: 0-No pain   Body mass index is 28.71 kg/m².      Physical Exam   Constitutional: He is oriented to person, place, and time. He appears well-developed and well-nourished. No distress.   HENT:   Head: Normocephalic and atraumatic.   Mouth/Throat: Mucous membranes are normal. No oral lesions.   Eyes: Conjunctivae are normal.   Neck: No thyromegaly present.   Cardiovascular: Normal rate, regular rhythm and normal heart sounds.    No murmur heard.  Pulmonary/Chest: Breath sounds normal. He has no wheezes. He has no rales.   Abdominal: Soft. He exhibits no distension and no mass. There is splenomegaly. There is no hepatomegaly. There is no tenderness.   Spleen is palpated below rib cage, but it does not measure more than 6 cm on physical exam below this landmark   Genitourinary:   Genitourinary Comments: Right direct inguinal hernia palpated.    Musculoskeletal: He exhibits no edema.   Lymphadenopathy:     He has no cervical adenopathy.        Right cervical: No deep cervical adenopathy present.       Left cervical: No deep cervical adenopathy present.     He has no axillary adenopathy.        Right: No inguinal adenopathy present.        Left: No inguinal adenopathy present.   Neurological: He is alert and oriented to person, place, and time. He has normal strength and normal reflexes. No cranial nerve deficit. Coordination normal.   Skin: No rash noted.       LAB:   Results for orders placed or performed in visit on 06/07/18   CBC auto differential   Result Value Ref Range    WBC 50.64 (HH) 3.90 - 12.70 K/uL    RBC 4.80 4.60 - 6.20 M/uL    Hemoglobin 14.3 14.0 - 18.0 g/dL    Hematocrit 43.0 40.0 - 54.0 %    MCV 90 82 - 98 fL "    MCH 29.8 27.0 - 31.0 pg    MCHC 33.3 32.0 - 36.0 g/dL    RDW 13.9 11.5 - 14.5 %    Platelets 172 150 - 350 K/uL    MPV 11.1 9.2 - 12.9 fL    Gran% 6.0 (L) 38.0 - 73.0 %    Lymph% 94.0 (H) 18.0 - 48.0 %    Mono% 0.0 (L) 4.0 - 15.0 %    Eosinophil% 0.0 0.0 - 8.0 %    Basophil% 0.0 0.0 - 1.9 %    Smudge Cells Present     Differential Method Manual    IGH Somatic Hypermutation in B-CLL   Result Value Ref Range    BCLL Result see interpretation     BCLL specimen Type BLOOD     BCLL Final Diagnosis SEE BELOW    Dunnigan Miscellaneous Test   Result Value Ref Range    Dunnigan Miscellaneous Result SEE COMMENTS        PROBLEMS ASSESSED THIS VISIT:    1. CLL (chronic lymphocytic leukemia)    2. Non-recurrent unilateral inguinal hernia without obstruction or gangrene        IMPRESSION:    1. Chronic lymphocytic leukemia   A. 5/21/2018: Evaluated by Dr. Georges in [place] after being referred from pre-operative evaluation for high WBC. WBC 60.8 with 84% lymphocytes. Peripheral blood flow cytometry consistent with CLL.    B. 5/23/2018: CLL FISH shows no abnormalities. CT chest, abdomen, pelvis - Numerous enlarged periportal, pericelia, periaortic, and pericaval lymph nodes. Representative nodes - 4 x 3.7 cm in portacaval space; 2.4 x 1.4 cm in aortocaval space. Multiple enlarged nodes present along both ilaic chains in the pelvic sidewalls with representative nodes measuring 3.1 x 2.2 cm on right and 3 x 1.9 cm on left. Borderline enlarged nodes measuring on antonio order of 1 cm short axis in both inguinal regions. Several scattered subcentimeter hypodense foci in the liver; 10 mm focus of nodular hyperenhancement is noted adjacent o gallbaldder fossa. Spleen is enlarged measuring 17 cm in cradniocaudal length.    C. 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 TP53 mutation analysis shows no mutations.     2. R direct inguinal hernia    PLAN:       CLL (chronic lymphocytic leukemia)  Dr. Esquivel has Reddy Stage II CLL (due to splenomegaly that  is palpable on exam). Unfortunately, he has a higher risk status due to his unmutated IGHV status.     We reviewed the 2018 revision of the iwCLL guidelines for management of CLL. He currently does not have a compelling reason to initiate therapy at this time. The only exception would be if his developing adenopathy is the actual source of the symptom of his hernia, but even this is a relatively minor symptom for the patient at this time. He does not have night sweats, progressive adenopathy, or pain. No threatened organ function. He has had approximately 15 pounds of weight loss which he states is volitional over the last month. We will need to monitor closely to see if this is truly the case or if he continues to lose weight. If he continues to lose weight, that would be an indication to initiate therapy.     He has excellent performance status, is young, and has no comorbid conditions. Thus, when he does need therapy, I would recommend FCR, as this has superior progression free survival over BR in the first-line setting. We reviewed that patients with unmutated IGHV typically do not have remissions lasting decades. Median remissions are usually measured in a few years in this population. Thus, an appropriate clinical trial in the first line setting would also be a good consideration given his IGHV mutational status. We currently do not have any first-line therapy trials for CLL here.    We also discussed monitoring. As he is higher risk, I recommended close follow-up initially with monthly CBC to assess the pace of change of his absolute lymphocyte count, as lymphocyte doubling time is predictive of progression of disease. Thus far, he actually has had no increase in his ALC. I also recommended repeat CT scan 3 months from his initial scan to help assess whether he has any rapid progression of lymphadenopathy. He should be followed clinically every three months initially.    We also discussed the risk of  transformation to DLBCL in CLL. We reviewed other malignant presentations as well, such as autoimmune hemolytic anemia.     In summary, our plan is as follows:  - Monthly CBC with Dr. Georges  - Repeat CT scan in 3 months to assess for any change; further scans will be determined based on whether or not there is any appreciable difference in lymphadenopathy  - Clinic visits (with Dr. Georges) every three months; he may follow here at the time therapy is being considered    Non-recurrent unilateral inguinal hernia  I think it is okay for him to have surgical repair of this, as he does not need immediate therapy for CLL. He should not have any issues with wound healing at this point.     Follow-up  As above    Arnel Chen MD  Hematology and Stem Cell Transplant

## 2018-07-02 ENCOUNTER — PATIENT MESSAGE (OUTPATIENT)
Dept: HEMATOLOGY/ONCOLOGY | Facility: CLINIC | Age: 58
End: 2018-07-02

## 2018-07-02 NOTE — ASSESSMENT & PLAN NOTE
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 Unfortunately, he has a higher risk status due to his unmutated IGHV status.     We reviewed the 2018 revision of the iwCLL guidelines for management of CLL. He currently does not have a compelling reason to initiate therapy at this time. The only exception would be if his developing adenopathy is the actual source of the symptom of his hernia, but even this is a relatively minor symptom for the patient at this time. He does not have night sweats, progressive adenopathy, or pain. No threatened organ function. He has had approximately 15 pounds of weight loss which he states is volitional over the last month. We will need to monitor closely to see if this is truly the case or if he continues to lose weight. If he continues to lose weight, that would be an indication to initiate therapy.     He has excellent performance status, is young, and has no comorbid conditions. Thus, when he does need therapy, I would recommend FCR, as this has superior progression free survival over BR in the first-line setting. We reviewed that patients with unmutated IGHV typically do not have remissions lasting decades. Median remissions are usually measured in a few years in this population. Thus, an appropriate clinical trial in the first line setting would also be a good consideration given his IGHV mutational status. We currently do not have any first-line therapy trials for CLL here.    We also discussed monitoring. As he is higher risk, I recommended close follow-up initially with monthly CBC to assess the pace of change of his absolute lymphocyte count, as lymphocyte doubling time is predictive of progression of disease. Thus far, he actually has had no increase in his ALC. I also recommended repeat CT scan 3 months from his initial scan to help assess whether he has any rapid progression of lymphadenopathy. He should be followed clinically every three  months initially.    We also discussed the risk of transformation to DLBCL in CLL. We reviewed other malignant presentations as well, such as autoimmune hemolytic anemia.     In summary, our plan is as follows:  - Monthly CBC with Dr. Georges  - Repeat CT scan in 3 months to assess for any change; further scans will be determined based on whether or not there is any appreciable difference in lymphadenopathy  - Clinic visits (with Dr. Georges) every three months; he may follow here at the time therapy is being considered

## 2018-07-02 NOTE — ASSESSMENT & PLAN NOTE
I think it is okay for him to have surgical repair of this, as he does not need immediate therapy for CLL. He should not have any issues with wound healing at this point.

## 2018-07-03 ENCOUNTER — PATIENT MESSAGE (OUTPATIENT)
Dept: HEMATOLOGY/ONCOLOGY | Facility: CLINIC | Age: 58
End: 2018-07-03

## 2018-07-03 ENCOUNTER — TELEPHONE (OUTPATIENT)
Dept: HEMATOLOGY/ONCOLOGY | Facility: CLINIC | Age: 58
End: 2018-07-03

## 2018-07-03 DIAGNOSIS — C91.10 CLL (CHRONIC LYMPHOCYTIC LEUKEMIA): ICD-10-CM

## 2018-07-03 RX ORDER — ZOLPIDEM TARTRATE 5 MG/1
5 TABLET ORAL NIGHTLY PRN
Qty: 30 TABLET | Refills: 0 | Status: SHIPPED | OUTPATIENT
Start: 2018-07-03 | End: 2018-07-05 | Stop reason: SDUPTHER

## 2018-07-05 DIAGNOSIS — C91.10 CLL (CHRONIC LYMPHOCYTIC LEUKEMIA): ICD-10-CM

## 2018-07-05 RX ORDER — ZOLPIDEM TARTRATE 5 MG/1
5 TABLET ORAL NIGHTLY PRN
Qty: 30 TABLET | Refills: 0 | OUTPATIENT
Start: 2018-07-05

## 2018-07-05 RX ORDER — ZOLPIDEM TARTRATE 5 MG/1
5 TABLET ORAL NIGHTLY PRN
Qty: 30 TABLET | Refills: 1 | Status: SHIPPED | OUTPATIENT
Start: 2018-07-05 | End: 2018-10-25

## 2018-07-26 ENCOUNTER — PATIENT MESSAGE (OUTPATIENT)
Dept: HEMATOLOGY/ONCOLOGY | Facility: CLINIC | Age: 58
End: 2018-07-26

## 2018-07-31 ENCOUNTER — PATIENT MESSAGE (OUTPATIENT)
Dept: HEMATOLOGY/ONCOLOGY | Facility: CLINIC | Age: 58
End: 2018-07-31

## 2018-10-25 ENCOUNTER — OFFICE VISIT (OUTPATIENT)
Dept: HEMATOLOGY/ONCOLOGY | Facility: CLINIC | Age: 58
End: 2018-10-25
Payer: COMMERCIAL

## 2018-10-25 ENCOUNTER — TELEPHONE (OUTPATIENT)
Dept: HEMATOLOGY/ONCOLOGY | Facility: CLINIC | Age: 58
End: 2018-10-25

## 2018-10-25 ENCOUNTER — LAB VISIT (OUTPATIENT)
Dept: LAB | Facility: HOSPITAL | Age: 58
End: 2018-10-25
Attending: INTERNAL MEDICINE
Payer: COMMERCIAL

## 2018-10-25 ENCOUNTER — PATIENT MESSAGE (OUTPATIENT)
Dept: HEMATOLOGY/ONCOLOGY | Facility: CLINIC | Age: 58
End: 2018-10-25

## 2018-10-25 VITALS
HEART RATE: 64 BPM | BODY MASS INDEX: 29.62 KG/M2 | DIASTOLIC BLOOD PRESSURE: 79 MMHG | OXYGEN SATURATION: 98 % | WEIGHT: 230.81 LBS | TEMPERATURE: 98 F | SYSTOLIC BLOOD PRESSURE: 142 MMHG | HEIGHT: 74 IN

## 2018-10-25 DIAGNOSIS — D49.89 NEOPLASM OF ABDOMEN: ICD-10-CM

## 2018-10-25 DIAGNOSIS — C91.10 CLL (CHRONIC LYMPHOCYTIC LEUKEMIA): Primary | ICD-10-CM

## 2018-10-25 DIAGNOSIS — C91.10 CLL (CHRONIC LYMPHOCYTIC LEUKEMIA): ICD-10-CM

## 2018-10-25 LAB
BASOPHILS # BLD AUTO: ABNORMAL K/UL
BASOPHILS NFR BLD: 0 %
DIFFERENTIAL METHOD: ABNORMAL
EOSINOPHIL # BLD AUTO: ABNORMAL K/UL
EOSINOPHIL NFR BLD: 0 %
ERYTHROCYTE [DISTWIDTH] IN BLOOD BY AUTOMATED COUNT: 14.6 %
HCT VFR BLD AUTO: 44.1 %
HGB BLD-MCNC: 14.4 G/DL
LYMPHOCYTES # BLD AUTO: ABNORMAL K/UL
LYMPHOCYTES NFR BLD: 86 %
MCH RBC QN AUTO: 29.4 PG
MCHC RBC AUTO-ENTMCNC: 32.7 G/DL
MCV RBC AUTO: 90 FL
MONOCYTES # BLD AUTO: ABNORMAL K/UL
MONOCYTES NFR BLD: 1 %
NEUTROPHILS NFR BLD: 13 %
PLATELET # BLD AUTO: 183 K/UL
PMV BLD AUTO: 10.2 FL
RBC # BLD AUTO: 4.89 M/UL
WBC # BLD AUTO: 52.23 K/UL

## 2018-10-25 PROCEDURE — 90471 IMMUNIZATION ADMIN: CPT | Mod: S$GLB,,, | Performed by: INTERNAL MEDICINE

## 2018-10-25 PROCEDURE — 85007 BL SMEAR W/DIFF WBC COUNT: CPT

## 2018-10-25 PROCEDURE — 85027 COMPLETE CBC AUTOMATED: CPT

## 2018-10-25 PROCEDURE — 3008F BODY MASS INDEX DOCD: CPT | Mod: CPTII,S$GLB,, | Performed by: INTERNAL MEDICINE

## 2018-10-25 PROCEDURE — 99999 PR PBB SHADOW E&M-EST. PATIENT-LVL IV: CPT | Mod: PBBFAC,,, | Performed by: INTERNAL MEDICINE

## 2018-10-25 PROCEDURE — 90686 IIV4 VACC NO PRSV 0.5 ML IM: CPT | Mod: S$GLB,,, | Performed by: INTERNAL MEDICINE

## 2018-10-25 PROCEDURE — 36415 COLL VENOUS BLD VENIPUNCTURE: CPT

## 2018-10-25 PROCEDURE — 99214 OFFICE O/P EST MOD 30 MIN: CPT | Mod: 25,S$GLB,, | Performed by: INTERNAL MEDICINE

## 2018-10-25 RX ORDER — ACETAMINOPHEN 500 MG
400 TABLET ORAL
COMMUNITY

## 2018-10-25 NOTE — PROGRESS NOTES
Subjective:       Patient ID: Rocky Esquivel is a 58 y.o. male.    Chief Complaint: Results and Leukemia    HPI 58-year-old male stage 1 CLL lymphadenopathy retroperitoneum no evidence of fever night sweats continues to work full-time [employer] status 1    Past Medical History:   Diagnosis Date    Allergy     as a child    Anxiety     Therapy     marital therapy     Family History   Problem Relation Age of Onset    Anxiety disorder Mother     Colon cancer Father     No Known Problems Sister     No Known Problems Brother     No Known Problems Maternal Aunt     No Known Problems Maternal Uncle     No Known Problems Paternal Aunt     No Known Problems Paternal Uncle     Liver cancer Paternal Grandmother     Schizophrenia Maternal Grandmother     Bipolar disorder Daughter      Social History     Socioeconomic History    Marital status:      Spouse name: Heena     Number of children: 4    Years of education: Not on file    Highest education level: Not on file   Social Needs    Financial resource strain: Not on file    Food insecurity - worry: Not on file    Food insecurity - inability: Not on file    Transportation needs - medical: Not on file    Transportation needs - non-medical: Not on file   Occupational History    Not on file   Tobacco Use    Smoking status: Former Smoker     Types: Cigarettes     Last attempt to quit:      Years since quittin.8    Smokeless tobacco: Never Used   Substance and Sexual Activity    Alcohol use: Yes     Alcohol/week: 3.0 oz     Types: 3 Cans of beer, 2 Shots of liquor per week    Drug use: No    Sexual activity: Yes     Partners: Female     Birth control/protection: See Surgical Hx   Other Topics Concern    Patient feels they ought to cut down on drinking/drug use Not Asked    Patient annoyed by others criticizing their drinking/drug use Not Asked    Patient has felt bad or guilty about drinking/drug use Not Asked    Patient has had a  drink/used drugs as an eye opener in the AM Not Asked   Social History Narrative     ([place], 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); 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, orthodontist     Past Surgical History:   Procedure Laterality Date    COLONOSCOPY      2017    NASAL SEPTUM SURGERY  1988    TONSILLECTOMY      1972 approx    VASECTOMY  2003       Labs:  Lab Results   Component Value Date    WBC 52.23 (HH) 10/25/2018    HGB 14.4 10/25/2018    HCT 44.1 10/25/2018    MCV 90 10/25/2018     10/25/2018     BMP  Lab Results   Component Value Date     06/29/2018    K 4.3 06/29/2018     06/29/2018    CO2 26 06/29/2018    BUN 16 06/29/2018    CREATININE 1.2 06/29/2018    CALCIUM 8.9 06/29/2018    ANIONGAP 8 06/29/2018    ESTGFRAFRICA >60.0 06/29/2018    EGFRNONAA >60.0 06/29/2018     Lab Results   Component Value Date    ALT 18 06/29/2018    AST 21 06/29/2018    ALKPHOS 42 (L) 06/29/2018    BILITOT 0.6 06/29/2018       No results found for: IRON, TIBC, FERRITIN, SATURATEDIRO  No results found for: UQQKLBNB05  No results found for: FOLATE  No results found for: TSH      Review of Systems   Constitutional: Negative for appetite change.   HENT: Negative for facial swelling, postnasal drip and sneezing.    Eyes: Negative for visual disturbance.   Respiratory: Negative for shortness of breath.    Cardiovascular: Negative for chest pain.   Gastrointestinal: Negative for abdominal pain and diarrhea.   Genitourinary: Negative for frequency.   Musculoskeletal: Negative for back pain.   Skin: Negative for rash.   Neurological: Negative for headaches.   Hematological: Positive for adenopathy.   Psychiatric/Behavioral: The patient is nervous/anxious.        Objective:      Physical Exam   Constitutional: He is oriented to person, place, and time. He appears well-developed and well-nourished. He appears distressed.   HENT:   Head: Normocephalic.   Right Ear: External ear normal.   Left Ear: External ear normal.   Nose: Nose normal. Right sinus exhibits  no maxillary sinus tenderness and no frontal sinus tenderness. Left sinus exhibits no maxillary sinus tenderness and no frontal sinus tenderness.   Mouth/Throat: Oropharynx is clear and moist. No oropharyngeal exudate.   Eyes: EOM and lids are normal. Pupils are equal, round, and reactive to light. Right eye exhibits no discharge. Left eye exhibits no discharge. Right conjunctiva is not injected. Right conjunctiva has no hemorrhage. Left conjunctiva is not injected. Left conjunctiva has no hemorrhage. No scleral icterus. Right eye exhibits normal extraocular motion. Left eye exhibits normal extraocular motion.   Neck: Normal range of motion. Neck supple. No JVD present. No tracheal deviation present. No thyromegaly present.   Cardiovascular: Normal rate and regular rhythm.   Pulmonary/Chest: Effort normal. No stridor. No respiratory distress.   Abdominal: Soft. Bowel sounds are normal. He exhibits no mass. There is no hepatosplenomegaly, splenomegaly or hepatomegaly. There is no tenderness.   Musculoskeletal: Normal range of motion. He exhibits no edema or tenderness.   Lymphadenopathy:        Head (right side): No posterior auricular and no occipital adenopathy present.        Head (left side): No posterior auricular and no occipital adenopathy present.     He has no cervical adenopathy.        Right cervical: No superficial cervical, no deep cervical and no posterior cervical adenopathy present.       Left cervical: No superficial cervical, no deep cervical and no posterior cervical adenopathy present.     He has no axillary adenopathy.        Right: No supraclavicular adenopathy present.        Left: No supraclavicular adenopathy present.   Neurological: He is alert and oriented to person, place, and time. He has normal strength. No cranial nerve deficit. Coordination normal.   Skin: Skin is dry. No rash noted. He is not diaphoretic. No cyanosis or erythema. Nails show no clubbing.   Psychiatric: He has a normal  mood and affect. His behavior is normal. Judgment and thought content normal. Cognition and memory are normal.   Vitals reviewed.          Assessment:      1. CLL (chronic lymphocytic leukemia)    2. Neoplasm of abdomen           Plan:     Reviewed note from 07/25/2018 Rocky bojorquez MD [facility].  Clinical follow-up fever precautions reviewed return in December 2018 with repeat imaging studies for progression and response fever precautions again reiterated to him flu shot given        Shakeel Georges Jr, MD FACP

## 2018-11-12 ENCOUNTER — PATIENT MESSAGE (OUTPATIENT)
Dept: HEMATOLOGY/ONCOLOGY | Facility: CLINIC | Age: 58
End: 2018-11-12

## 2018-12-21 ENCOUNTER — HOSPITAL ENCOUNTER (OUTPATIENT)
Dept: RADIOLOGY | Facility: HOSPITAL | Age: 58
Discharge: HOME | End: 2018-12-21
Attending: INTERNAL MEDICINE
Payer: COMMERCIAL

## 2018-12-21 ENCOUNTER — OFFICE VISIT (OUTPATIENT)
Dept: HEMATOLOGY/ONCOLOGY | Facility: CLINIC | Age: 58
End: 2018-12-21
Payer: COMMERCIAL

## 2018-12-21 VITALS
TEMPERATURE: 98 F | RESPIRATION RATE: 18 BRPM | HEIGHT: 74 IN | OXYGEN SATURATION: 97 % | HEART RATE: 61 BPM | WEIGHT: 235.25 LBS | SYSTOLIC BLOOD PRESSURE: 120 MMHG | DIASTOLIC BLOOD PRESSURE: 82 MMHG | BODY MASS INDEX: 30.19 KG/M2

## 2018-12-21 DIAGNOSIS — D49.89 NEOPLASM OF ABDOMEN: ICD-10-CM

## 2018-12-21 DIAGNOSIS — C91.10 CLL (CHRONIC LYMPHOCYTIC LEUKEMIA): ICD-10-CM

## 2018-12-21 DIAGNOSIS — E78.01 FAMILIAL HYPERCHOLESTEROLEMIA: Primary | ICD-10-CM

## 2018-12-21 PROCEDURE — 74177 CT ABD & PELVIS W/CONTRAST: CPT | Mod: TC

## 2018-12-21 PROCEDURE — 99213 OFFICE O/P EST LOW 20 MIN: CPT | Mod: S$GLB,,, | Performed by: INTERNAL MEDICINE

## 2018-12-21 PROCEDURE — 3008F BODY MASS INDEX DOCD: CPT | Mod: CPTII,S$GLB,, | Performed by: INTERNAL MEDICINE

## 2018-12-21 PROCEDURE — 99999 PR PBB SHADOW E&M-EST. PATIENT-LVL III: CPT | Mod: PBBFAC,,, | Performed by: INTERNAL MEDICINE

## 2018-12-21 PROCEDURE — 71260 CT THORAX DX C+: CPT | Mod: TC

## 2018-12-21 PROCEDURE — 25500020 PHARM REV CODE 255: Performed by: INTERNAL MEDICINE

## 2018-12-21 RX ADMIN — IOHEXOL 30 ML: 350 INJECTION, SOLUTION INTRAVENOUS at 09:12

## 2018-12-21 RX ADMIN — IOHEXOL 75 ML: 350 INJECTION, SOLUTION INTRAVENOUS at 09:12

## 2018-12-21 NOTE — PROGRESS NOTES
Subjective:       Patient ID: Rocky Esquivel is a 58 y.o. male.    Chief Complaint: Follow-up; Results; and Leukemia (Chronic lymphocytic leukemia)    HPI 58-year-old male stage II chronic lymphocytic leukemia patient returns for repeat imaging studies and review last CBC performed 2018    Past Medical History:   Diagnosis Date    Allergy     as a child    Anxiety     Therapy     marital therapy     Family History   Problem Relation Age of Onset    Anxiety disorder Mother     Colon cancer Father     No Known Problems Sister     No Known Problems Brother     No Known Problems Maternal Aunt     No Known Problems Maternal Uncle     No Known Problems Paternal Aunt     No Known Problems Paternal Uncle     Liver cancer Paternal Grandmother     Schizophrenia Maternal Grandmother     Bipolar disorder Daughter      Social History     Socioeconomic History    Marital status:      Spouse name: Heena     Number of children: 4    Years of education: Not on file    Highest education level: Not on file   Social Needs    Financial resource strain: Not on file    Food insecurity - worry: Not on file    Food insecurity - inability: Not on file    Transportation needs - medical: Not on file    Transportation needs - non-medical: Not on file   Occupational History    Not on file   Tobacco Use    Smoking status: Former Smoker     Types: Cigarettes     Last attempt to quit: 2007     Years since quittin.9    Smokeless tobacco: Never Used   Substance and Sexual Activity    Alcohol use: Yes     Alcohol/week: 3.0 oz     Types: 3 Cans of beer, 2 Shots of liquor per week    Drug use: No    Sexual activity: Yes     Partners: Female     Birth control/protection: See Surgical Hx   Other Topics Concern    Patient feels they ought to cut down on drinking/drug use Not Asked    Patient annoyed by others criticizing their drinking/drug use Not Asked    Patient has felt bad or guilty about  drinking/drug use Not Asked    Patient has had a drink/used drugs as an eye opener in the AM Not Asked   Social History Narrative     ([place], 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); 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, orthodontist     Past Surgical History:   Procedure Laterality Date    COLONOSCOPY      2017    NASAL SEPTUM SURGERY  1988    TONSILLECTOMY      1972 approx    VASECTOMY  2003       Labs:  Lab Results   Component Value Date    WBC 52.23 (HH) 10/25/2018    HGB 14.4 10/25/2018    HCT 44.1 10/25/2018    MCV 90 10/25/2018     10/25/2018     BMP  Lab Results   Component Value Date     06/29/2018    K 4.3 06/29/2018     06/29/2018    CO2 26 06/29/2018    BUN 16 06/29/2018    CREATININE 1.2 06/29/2018    CALCIUM 8.9 06/29/2018    ANIONGAP 8 06/29/2018    ESTGFRAFRICA >60.0 06/29/2018    EGFRNONAA >60.0 06/29/2018     Lab Results   Component Value Date    ALT 18 06/29/2018    AST 21 06/29/2018    ALKPHOS 42 (L) 06/29/2018    BILITOT 0.6 06/29/2018       No results found for: IRON, TIBC, FERRITIN, SATURATEDIRO  No results found for: [id number]  No results found for: FOLATE  No results found for: TSH      Review of Systems   Constitutional: Negative for activity change, appetite change, chills, diaphoresis, fatigue, fever and unexpected weight change.   HENT: Negative for congestion, dental problem, drooling, ear discharge, ear pain, facial swelling, hearing loss, mouth sores, nosebleeds, postnasal drip, rhinorrhea, sinus pressure, sneezing, sore throat, tinnitus, trouble swallowing and voice change.    Eyes: Negative for photophobia, pain, discharge, redness, itching and visual disturbance.   Respiratory: Negative for apnea, cough, choking, chest tightness, shortness of breath, wheezing and stridor.    Cardiovascular: Negative for chest pain, palpitations and leg swelling.   Gastrointestinal: Negative for abdominal distention, abdominal pain, anal bleeding, blood in stool, constipation, diarrhea, nausea, rectal pain and  vomiting.   Endocrine: Negative for cold intolerance, heat intolerance, polydipsia, polyphagia and polyuria.   Genitourinary: Negative for decreased urine volume, difficulty urinating, discharge, dysuria, enuresis, flank pain, frequency, genital sores, hematuria, penile pain, penile swelling, scrotal swelling, testicular pain and urgency.   Musculoskeletal: Negative for arthralgias, back pain, gait problem, joint swelling, myalgias, neck pain and neck stiffness.   Skin: Negative for color change, pallor, rash and wound.   Allergic/Immunologic: Negative for environmental allergies, food allergies and immunocompromised state.   Neurological: Negative for dizziness, tremors, seizures, syncope, facial asymmetry, speech difficulty, weakness, light-headedness, numbness and headaches.   Hematological: Negative for adenopathy. Does not bruise/bleed easily.   Psychiatric/Behavioral: Negative for agitation, behavioral problems, confusion, decreased concentration, dysphoric mood, hallucinations, self-injury, sleep disturbance and suicidal ideas. The patient is nervous/anxious. The patient is not hyperactive.        Objective:      Physical Exam   Constitutional: He is oriented to person, place, and time. He appears well-developed and well-nourished. He appears distressed.   HENT:   Head: Normocephalic.   Right Ear: External ear normal.   Left Ear: External ear normal.   Nose: Nose normal. Right sinus exhibits no maxillary sinus tenderness and no frontal sinus tenderness. Left sinus exhibits no maxillary sinus tenderness and no frontal sinus tenderness.   Mouth/Throat: Oropharynx is clear and moist. No oropharyngeal exudate.   Eyes: EOM and lids are normal. Pupils are equal, round, and reactive to light. Right eye exhibits no discharge. Left eye exhibits no discharge. Right conjunctiva is not injected. Right conjunctiva has no hemorrhage. Left conjunctiva is not injected. Left conjunctiva has no hemorrhage. No scleral icterus.  Right eye exhibits normal extraocular motion. Left eye exhibits normal extraocular motion.   Neck: Normal range of motion. Neck supple. No JVD present. No tracheal deviation present. No thyromegaly present.   Cardiovascular: Normal rate and regular rhythm.   Pulmonary/Chest: Effort normal. No stridor. No respiratory distress.   Abdominal: Soft. He exhibits no mass. There is no hepatosplenomegaly, splenomegaly or hepatomegaly. There is no tenderness.   Musculoskeletal: Normal range of motion. He exhibits no edema or tenderness.   Lymphadenopathy:        Head (right side): No posterior auricular and no occipital adenopathy present.        Head (left side): No posterior auricular and no occipital adenopathy present.     He has no cervical adenopathy.        Right cervical: No superficial cervical, no deep cervical and no posterior cervical adenopathy present.       Left cervical: No superficial cervical, no deep cervical and no posterior cervical adenopathy present.     He has no axillary adenopathy.        Right: No supraclavicular adenopathy present.        Left: No supraclavicular adenopathy present.   Neurological: He is alert and oriented to person, place, and time. He has normal strength. No cranial nerve deficit. Coordination normal.   Skin: Skin is dry. No rash noted. He is not diaphoretic. No cyanosis or erythema. Nails show no clubbing.   Psychiatric: His behavior is normal. Judgment and thought content normal. His mood appears anxious. Cognition and memory are normal.   Vitals reviewed.          Assessment:      1. Familial hypercholesterolemia    2. CLL (chronic lymphocytic leukemia)           Plan:     We will proceed with lipid profile next blood draw patient's request results of imaging studies reveals no evidence of progressive findings pulmonary nodule reviewed personally no evidence of progression.  Follow-up in March or April 2019 with repeat CBC CMP LDH and lipid profile.  Follow-up at [facility]  in July 2019.  Fever precautions reviewed and updated        Shakeel Georges Jr, MD FACP

## 2019-11-04 ENCOUNTER — PATIENT MESSAGE (OUTPATIENT)
Dept: HEMATOLOGY/ONCOLOGY | Facility: CLINIC | Age: 59
End: 2019-11-04

## 2019-12-05 ENCOUNTER — OFFICE VISIT (OUTPATIENT)
Dept: HEMATOLOGY/ONCOLOGY | Facility: CLINIC | Age: 59
End: 2019-12-05
Payer: COMMERCIAL

## 2019-12-05 ENCOUNTER — LAB VISIT (OUTPATIENT)
Dept: LAB | Facility: HOSPITAL | Age: 59
End: 2019-12-05
Attending: INTERNAL MEDICINE
Payer: COMMERCIAL

## 2019-12-05 VITALS
WEIGHT: 226.19 LBS | BODY MASS INDEX: 29.03 KG/M2 | DIASTOLIC BLOOD PRESSURE: 83 MMHG | HEART RATE: 54 BPM | HEIGHT: 74 IN | RESPIRATION RATE: 18 BRPM | SYSTOLIC BLOOD PRESSURE: 130 MMHG | TEMPERATURE: 98 F | OXYGEN SATURATION: 96 %

## 2019-12-05 DIAGNOSIS — C91.10 CLL (CHRONIC LYMPHOCYTIC LEUKEMIA): ICD-10-CM

## 2019-12-05 DIAGNOSIS — D49.89 NEOPLASM OF ABDOMEN: ICD-10-CM

## 2019-12-05 DIAGNOSIS — C91.10 CLL (CHRONIC LYMPHOCYTIC LEUKEMIA): Primary | ICD-10-CM

## 2019-12-05 DIAGNOSIS — D64.9 NORMOCYTIC ANEMIA: ICD-10-CM

## 2019-12-05 DIAGNOSIS — E78.01 FAMILIAL HYPERCHOLESTEROLEMIA: ICD-10-CM

## 2019-12-05 LAB
ALBUMIN SERPL BCP-MCNC: 4.4 G/DL (ref 3.5–5.2)
ALP SERPL-CCNC: 55 U/L (ref 55–135)
ALT SERPL W/O P-5'-P-CCNC: 34 U/L (ref 10–44)
ANION GAP SERPL CALC-SCNC: 8 MMOL/L (ref 8–16)
ANISOCYTOSIS BLD QL SMEAR: SLIGHT
AST SERPL-CCNC: 30 U/L (ref 10–40)
BASOPHILS NFR BLD: 0 % (ref 0–1.9)
BILIRUB SERPL-MCNC: 0.7 MG/DL (ref 0.1–1)
BUN SERPL-MCNC: 16 MG/DL (ref 6–20)
CALCIUM SERPL-MCNC: 9 MG/DL (ref 8.7–10.5)
CHLORIDE SERPL-SCNC: 102 MMOL/L (ref 95–110)
CHOLEST SERPL-MCNC: 159 MG/DL (ref 120–199)
CHOLEST/HDLC SERPL: 5 {RATIO} (ref 2–5)
CO2 SERPL-SCNC: 29 MMOL/L (ref 23–29)
CREAT SERPL-MCNC: 1.2 MG/DL (ref 0.5–1.4)
DIFFERENTIAL METHOD: ABNORMAL
EOSINOPHIL NFR BLD: 0 % (ref 0–8)
ERYTHROCYTE [DISTWIDTH] IN BLOOD BY AUTOMATED COUNT: 15 % (ref 11.5–14.5)
EST. GFR  (AFRICAN AMERICAN): >60 ML/MIN/1.73 M^2
EST. GFR  (NON AFRICAN AMERICAN): >60 ML/MIN/1.73 M^2
GLUCOSE SERPL-MCNC: 90 MG/DL (ref 70–110)
HCT VFR BLD AUTO: 42.3 % (ref 40–54)
HDLC SERPL-MCNC: 32 MG/DL (ref 40–75)
HDLC SERPL: 20.1 % (ref 20–50)
HGB BLD-MCNC: 13 G/DL (ref 14–18)
IMM GRANULOCYTES # BLD AUTO: ABNORMAL K/UL (ref 0–0.04)
IMM GRANULOCYTES NFR BLD AUTO: ABNORMAL % (ref 0–0.5)
LDH SERPL L TO P-CCNC: 136 U/L (ref 110–260)
LDLC SERPL CALC-MCNC: 109.8 MG/DL (ref 63–159)
LYMPHOCYTES NFR BLD: 88 % (ref 18–48)
MCH RBC QN AUTO: 29.3 PG (ref 27–31)
MCHC RBC AUTO-ENTMCNC: 30.7 G/DL (ref 32–36)
MCV RBC AUTO: 95 FL (ref 82–98)
MONOCYTES NFR BLD: 3 % (ref 4–15)
NEUTROPHILS NFR BLD: 8 % (ref 38–73)
NEUTS BAND NFR BLD MANUAL: 1 %
NONHDLC SERPL-MCNC: 127 MG/DL
NRBC BLD-RTO: 0 /100 WBC
OVALOCYTES BLD QL SMEAR: ABNORMAL
PATH REV BLD -IMP: NORMAL
PLATELET # BLD AUTO: 131 K/UL (ref 150–350)
PMV BLD AUTO: 10.2 FL (ref 9.2–12.9)
POIKILOCYTOSIS BLD QL SMEAR: SLIGHT
POTASSIUM SERPL-SCNC: 4.2 MMOL/L (ref 3.5–5.1)
PROT SERPL-MCNC: 6.5 G/DL (ref 6–8.4)
RBC # BLD AUTO: 4.44 M/UL (ref 4.6–6.2)
SODIUM SERPL-SCNC: 139 MMOL/L (ref 136–145)
TRIGL SERPL-MCNC: 86 MG/DL (ref 30–150)
WBC # BLD AUTO: 91.5 K/UL (ref 3.9–12.7)

## 2019-12-05 PROCEDURE — 99214 PR OFFICE/OUTPT VISIT, EST, LEVL IV, 30-39 MIN: ICD-10-PCS | Mod: S$GLB,,, | Performed by: INTERNAL MEDICINE

## 2019-12-05 PROCEDURE — 99999 PR PBB SHADOW E&M-EST. PATIENT-LVL III: CPT | Mod: PBBFAC,,, | Performed by: INTERNAL MEDICINE

## 2019-12-05 PROCEDURE — 85060 PATHOLOGIST REVIEW: ICD-10-PCS | Mod: ,,, | Performed by: PATHOLOGY

## 2019-12-05 PROCEDURE — 3008F PR BODY MASS INDEX (BMI) DOCUMENTED: ICD-10-PCS | Mod: CPTII,S$GLB,, | Performed by: INTERNAL MEDICINE

## 2019-12-05 PROCEDURE — 3008F BODY MASS INDEX DOCD: CPT | Mod: CPTII,S$GLB,, | Performed by: INTERNAL MEDICINE

## 2019-12-05 PROCEDURE — 85027 COMPLETE CBC AUTOMATED: CPT

## 2019-12-05 PROCEDURE — 99999 PR PBB SHADOW E&M-EST. PATIENT-LVL III: ICD-10-PCS | Mod: PBBFAC,,, | Performed by: INTERNAL MEDICINE

## 2019-12-05 PROCEDURE — 85060 BLOOD SMEAR INTERPRETATION: CPT | Mod: ,,, | Performed by: PATHOLOGY

## 2019-12-05 PROCEDURE — 80061 LIPID PANEL: CPT

## 2019-12-05 PROCEDURE — 99214 OFFICE O/P EST MOD 30 MIN: CPT | Mod: S$GLB,,, | Performed by: INTERNAL MEDICINE

## 2019-12-05 PROCEDURE — 83615 LACTATE (LD) (LDH) ENZYME: CPT

## 2019-12-05 PROCEDURE — 85007 BL SMEAR W/DIFF WBC COUNT: CPT

## 2019-12-05 PROCEDURE — 36415 COLL VENOUS BLD VENIPUNCTURE: CPT

## 2019-12-05 PROCEDURE — 80053 COMPREHEN METABOLIC PANEL: CPT

## 2019-12-05 RX ORDER — MAGNESIUM OXIDE 200 MG
200 TABLET,CHEWABLE ORAL ONCE
COMMUNITY
Start: 2018-09-05 | End: 2020-11-19

## 2019-12-05 RX ORDER — HYDROCORTISONE 25 MG/ML
2.5 LOTION TOPICAL 2 TIMES DAILY
COMMUNITY
Start: 2019-11-22

## 2019-12-05 NOTE — PROGRESS NOTES
Subjective:       Patient ID: Rocky Esquivel is a 59 y.o. male.    Chief Complaint: Results and Leukemia    HPI 59-year-old male being followed concurrently by [facility] and [facility] with stage 0 chronic lymphocytic leukemia.  Patient is scheduled for follow-up in 2020 continues in active orthodontic  full-time but does report increasing fatigue      Past Medical History:   Diagnosis Date    Allergy     as a child    Anxiety     Therapy     marital therapy     Family History   Problem Relation Age of Onset    Anxiety disorder Mother     Colon cancer Father     No Known Problems Sister     No Known Problems Brother     No Known Problems Maternal Aunt     No Known Problems Maternal Uncle     No Known Problems Paternal Aunt     No Known Problems Paternal Uncle     Liver cancer Paternal Grandmother     Schizophrenia Maternal Grandmother     Bipolar disorder Daughter      Social History     Socioeconomic History    Marital status:      Spouse name: Heena     Number of children: 4    Years of education: Not on file    Highest education level: Not on file   Occupational History    Not on file   Social Needs    Financial resource strain: Not on file    Food insecurity:     Worry: Not on file     Inability: Not on file    Transportation needs:     Medical: Not on file     Non-medical: Not on file   Tobacco Use    Smoking status: Former Smoker     Types: Cigarettes     Last attempt to quit:      Years since quittin.9    Smokeless tobacco: Never Used   Substance and Sexual Activity    Alcohol use: Yes     Alcohol/week: 5.0 standard drinks     Types: 3 Cans of beer, 2 Shots of liquor per week    Drug use: No    Sexual activity: Yes     Partners: Female     Birth control/protection: See Surgical Hx   Lifestyle    Physical activity:     Days per week: Not on file     Minutes per session: Not on file    Stress: Not on file   Relationships    Social  connections:     Talks on phone: Not on file     Gets together: Not on file     Attends Druze service: Not on file     Active member of club or organization: Not on file     Attends meetings of clubs or organizations: Not on file     Relationship status: Not on file   Other Topics Concern    Patient feels they ought to cut down on drinking/drug use Not Asked    Patient annoyed by others criticizing their drinking/drug use Not Asked    Patient has felt bad or guilty about drinking/drug use Not Asked    Patient has had a drink/used drugs as an eye opener in the AM Not Asked   Social History Narrative     ([place], 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); 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, orthodontist     Past Surgical History:   Procedure Laterality Date    COLONOSCOPY      2017    NASAL SEPTUM SURGERY  1988    TONSILLECTOMY      1972 approx    VASECTOMY  2003       Labs:  Lab Results   Component Value Date    WBC 91.50 (HH) 12/05/2019    HGB 13.0 (L) 12/05/2019    HCT 42.3 12/05/2019    MCV 95 12/05/2019     (L) 12/05/2019     BMP  Lab Results   Component Value Date     12/05/2019    K 4.2 12/05/2019     12/05/2019    CO2 29 12/05/2019    BUN 16 12/05/2019    CREATININE 1.2 12/05/2019    CALCIUM 9.0 12/05/2019    ANIONGAP 8 12/05/2019    ESTGFRAFRICA >60 12/05/2019    EGFRNONAA >60 12/05/2019     Lab Results   Component Value Date    ALT 34 12/05/2019    AST 30 12/05/2019    ALKPHOS 55 12/05/2019    BILITOT 0.7 12/05/2019       No results found for: IRON, TIBC, FERRITIN, SATURATEDIRO  No results found for: [id number]  No results found for: FOLATE  No results found for: TSH      Review of Systems   Constitutional: Positive for fatigue. Negative for activity change, appetite change, chills, diaphoresis, fever and unexpected weight change.   HENT: Negative for congestion, dental problem, drooling, ear discharge, ear pain, facial swelling, hearing loss, mouth sores, nosebleeds, postnasal drip, rhinorrhea, sinus pressure,  sneezing, sore throat, tinnitus, trouble swallowing and voice change.    Eyes: Negative for photophobia, pain, discharge, redness, itching and visual disturbance.   Respiratory: Negative for apnea, cough, choking, chest tightness, shortness of breath, wheezing and stridor.    Cardiovascular: Negative for chest pain, palpitations and leg swelling.   Gastrointestinal: Negative for abdominal distention, abdominal pain, anal bleeding, blood in stool, constipation, diarrhea, nausea, rectal pain and vomiting.   Endocrine: Negative for cold intolerance, heat intolerance, polydipsia, polyphagia and polyuria.   Genitourinary: Negative for decreased urine volume, difficulty urinating, discharge, dysuria, enuresis, flank pain, frequency, genital sores, hematuria, penile pain, penile swelling, scrotal swelling, testicular pain and urgency.   Musculoskeletal: Negative for arthralgias, back pain, gait problem, joint swelling, myalgias, neck pain and neck stiffness.   Skin: Negative for color change, pallor, rash and wound.   Allergic/Immunologic: Negative for environmental allergies, food allergies and immunocompromised state.   Neurological: Negative for dizziness, tremors, seizures, syncope, facial asymmetry, speech difficulty, weakness, light-headedness, numbness and headaches.   Hematological: Negative for adenopathy. Does not bruise/bleed easily.   Psychiatric/Behavioral: Negative for agitation, behavioral problems, confusion, decreased concentration, dysphoric mood, hallucinations, self-injury, sleep disturbance and suicidal ideas. The patient is not nervous/anxious and is not hyperactive.        Objective:      Physical Exam   Constitutional: He is oriented to person, place, and time. He appears well-developed and well-nourished. He appears distressed.   HENT:   Head: Normocephalic.   Right Ear: External ear normal.   Left Ear: External ear normal.   Nose: Nose normal. Right sinus exhibits no maxillary sinus tenderness and  no frontal sinus tenderness. Left sinus exhibits no maxillary sinus tenderness and no frontal sinus tenderness.   Mouth/Throat: Oropharynx is clear and moist. No oropharyngeal exudate.   Eyes: Pupils are equal, round, and reactive to light. EOM and lids are normal. Right eye exhibits no discharge. Left eye exhibits no discharge. Right conjunctiva is not injected. Right conjunctiva has no hemorrhage. Left conjunctiva is not injected. Left conjunctiva has no hemorrhage. No scleral icterus. Right eye exhibits normal extraocular motion. Left eye exhibits normal extraocular motion.   Neck: Normal range of motion. Neck supple. No JVD present. No tracheal deviation present. No thyromegaly present.   Cardiovascular: Normal rate, regular rhythm and normal heart sounds.   Pulmonary/Chest: Effort normal and breath sounds normal. No stridor. No respiratory distress.   Abdominal: Soft. Bowel sounds are normal. He exhibits no mass. There is no hepatosplenomegaly, splenomegaly or hepatomegaly. There is tenderness.       Musculoskeletal: Normal range of motion. He exhibits no edema or tenderness.   Lymphadenopathy:        Head (right side): No posterior auricular and no occipital adenopathy present.        Head (left side): No posterior auricular and no occipital adenopathy present.     He has no cervical adenopathy.        Right cervical: No superficial cervical, no deep cervical and no posterior cervical adenopathy present.       Left cervical: No superficial cervical, no deep cervical and no posterior cervical adenopathy present.     He has no axillary adenopathy.        Right: No supraclavicular adenopathy present.        Left: No supraclavicular adenopathy present.   Neurological: He is alert and oriented to person, place, and time. He has normal strength. No cranial nerve deficit. Coordination normal.   Skin: Skin is dry. No rash noted. He is not diaphoretic. No cyanosis or erythema. Nails show no clubbing.   Psychiatric: He  has a normal mood and affect. His behavior is normal. Judgment and thought content normal. Cognition and memory are normal.   Vitals reviewed.          Assessment:      1. CLL (chronic lymphocytic leukemia)    2. Neoplasm of abdomen    3. Normocytic anemia           Plan:     Extensive conversation offered CT chest abdomen pelvis to be compared to 1 year ago.  Otherwise with rising white cell count falling hemoglobin increasing likelihood of need of treatment discussed risk benefit ratio at this point he wishes to postpone treatment will be seen back in February of 2020 at [facility] for further evaluation I will see back in 6 months fever precautions reviewed again with him        Shakeel Georges Jr, MD FACP

## 2020-11-18 ENCOUNTER — PATIENT MESSAGE (OUTPATIENT)
Dept: HEMATOLOGY/ONCOLOGY | Facility: CLINIC | Age: 60
End: 2020-11-18

## 2020-11-18 DIAGNOSIS — C91.10 CLL (CHRONIC LYMPHOCYTIC LEUKEMIA): Primary | ICD-10-CM

## 2020-11-19 ENCOUNTER — HOSPITAL ENCOUNTER (OUTPATIENT)
Dept: RADIOLOGY | Facility: HOSPITAL | Age: 60
Discharge: HOME | End: 2020-11-19
Attending: INTERNAL MEDICINE
Payer: COMMERCIAL

## 2020-11-19 ENCOUNTER — TELEPHONE (OUTPATIENT)
Dept: HEMATOLOGY/ONCOLOGY | Facility: CLINIC | Age: 60
End: 2020-11-19

## 2020-11-19 ENCOUNTER — OFFICE VISIT (OUTPATIENT)
Dept: HEMATOLOGY/ONCOLOGY | Facility: CLINIC | Age: 60
End: 2020-11-19
Payer: COMMERCIAL

## 2020-11-19 ENCOUNTER — PATIENT MESSAGE (OUTPATIENT)
Dept: HEMATOLOGY/ONCOLOGY | Facility: CLINIC | Age: 60
End: 2020-11-19

## 2020-11-19 VITALS
HEIGHT: 75 IN | DIASTOLIC BLOOD PRESSURE: 71 MMHG | SYSTOLIC BLOOD PRESSURE: 124 MMHG | WEIGHT: 221.56 LBS | BODY MASS INDEX: 27.55 KG/M2 | OXYGEN SATURATION: 97 % | HEART RATE: 69 BPM | TEMPERATURE: 98 F

## 2020-11-19 DIAGNOSIS — R06.00 DYSPNEA, UNSPECIFIED TYPE: ICD-10-CM

## 2020-11-19 DIAGNOSIS — C91.10 CLL (CHRONIC LYMPHOCYTIC LEUKEMIA): ICD-10-CM

## 2020-11-19 DIAGNOSIS — Z12.89 ENCOUNTER FOR SCREENING FOR MALIGNANT NEOPLASM OF OTHER SITES: ICD-10-CM

## 2020-11-19 DIAGNOSIS — D59.10 AIHA (AUTOIMMUNE HEMOLYTIC ANEMIA): Primary | ICD-10-CM

## 2020-11-19 DIAGNOSIS — C91.10 CLL (CHRONIC LYMPHOCYTIC LEUKEMIA): Primary | ICD-10-CM

## 2020-11-19 PROCEDURE — 99999 PR PBB SHADOW E&M-EST. PATIENT-LVL V: CPT | Mod: PBBFAC,,, | Performed by: INTERNAL MEDICINE

## 2020-11-19 PROCEDURE — 1126F AMNT PAIN NOTED NONE PRSNT: CPT | Mod: S$GLB,,, | Performed by: INTERNAL MEDICINE

## 2020-11-19 PROCEDURE — A9689 PHARM REV CODE 250: HCPCS | Performed by: INTERNAL MEDICINE

## 2020-11-19 PROCEDURE — 3008F BODY MASS INDEX DOCD: CPT | Mod: CPTII,S$GLB,, | Performed by: INTERNAL MEDICINE

## 2020-11-19 PROCEDURE — 71260 CT CHEST ABDOMEN PELVIS WITH CONTRAST (XPD): ICD-10-PCS | Mod: 26,,, | Performed by: RADIOLOGY

## 2020-11-19 PROCEDURE — 74177 CT ABD & PELVIS W/CONTRAST: CPT | Mod: 26,,, | Performed by: RADIOLOGY

## 2020-11-19 PROCEDURE — 74177 CT CHEST ABDOMEN PELVIS WITH CONTRAST (XPD): ICD-10-PCS | Mod: 26,,, | Performed by: RADIOLOGY

## 2020-11-19 PROCEDURE — 99215 PR OFFICE/OUTPT VISIT, EST, LEVL V, 40-54 MIN: ICD-10-PCS | Mod: S$GLB,,, | Performed by: INTERNAL MEDICINE

## 2020-11-19 PROCEDURE — 71260 CT THORAX DX C+: CPT | Mod: 26,,, | Performed by: RADIOLOGY

## 2020-11-19 PROCEDURE — 25500020 PHARM REV CODE 255: Performed by: INTERNAL MEDICINE

## 2020-11-19 PROCEDURE — 99999 PR PBB SHADOW E&M-EST. PATIENT-LVL V: ICD-10-PCS | Mod: PBBFAC,,, | Performed by: INTERNAL MEDICINE

## 2020-11-19 PROCEDURE — 99215 OFFICE O/P EST HI 40 MIN: CPT | Mod: S$GLB,,, | Performed by: INTERNAL MEDICINE

## 2020-11-19 PROCEDURE — 3008F PR BODY MASS INDEX (BMI) DOCUMENTED: ICD-10-PCS | Mod: CPTII,S$GLB,, | Performed by: INTERNAL MEDICINE

## 2020-11-19 PROCEDURE — 1126F PR PAIN SEVERITY QUANTIFIED, NO PAIN PRESENT: ICD-10-PCS | Mod: S$GLB,,, | Performed by: INTERNAL MEDICINE

## 2020-11-19 PROCEDURE — 25000003 PHARM REV CODE 250: Performed by: INTERNAL MEDICINE

## 2020-11-19 PROCEDURE — 74177 CT ABD & PELVIS W/CONTRAST: CPT | Mod: TC

## 2020-11-19 RX ORDER — ACETAMINOPHEN 325 MG/1
650 TABLET ORAL
Status: CANCELLED | OUTPATIENT
Start: 2020-11-19

## 2020-11-19 RX ORDER — DIPHENHYDRAMINE HCL 25 MG
25 CAPSULE ORAL
Status: CANCELLED | OUTPATIENT
Start: 2020-11-19

## 2020-11-19 RX ORDER — HYDROCODONE BITARTRATE AND ACETAMINOPHEN 500; 5 MG/1; MG/1
TABLET ORAL ONCE
Status: CANCELLED | OUTPATIENT
Start: 2020-11-19 | End: 2020-11-19

## 2020-11-19 RX ORDER — FOLIC ACID 1 MG/1
1 TABLET ORAL DAILY
Qty: 100 TABLET | Refills: 3 | Status: SHIPPED | OUTPATIENT
Start: 2020-11-19 | End: 2022-05-09

## 2020-11-19 RX ORDER — TAMSULOSIN HYDROCHLORIDE 0.4 MG/1
0.4 CAPSULE ORAL
COMMUNITY
Start: 2020-11-18

## 2020-11-19 RX ORDER — PREDNISONE 20 MG/1
20 TABLET ORAL DAILY
Qty: 120 TABLET | Refills: 11 | Status: SHIPPED | OUTPATIENT
Start: 2020-11-19 | End: 2020-11-20 | Stop reason: SDUPTHER

## 2020-11-19 RX ADMIN — IOHEXOL 100 ML: 350 INJECTION, SOLUTION INTRAVENOUS at 11:11

## 2020-11-19 RX ADMIN — IOHEXOL 1000 ML: 12 SOLUTION ORAL at 10:11

## 2020-11-19 NOTE — PROGRESS NOTES
Subjective:       Patient ID: Rocky Esquivel is a 60 y.o. male.    Chief Complaint: Results and Leukemia (CLL)    HPI 60-year-old male previous stage 0 CLL an observation patient reports increasing fatigue and shortness of breath while hiking in [place] I was called by primary physician yesterday with abnormal CBC was seen urgently this morning for review.  ECOG status 1    Past Medical History:   Diagnosis Date    Allergy     as a child    Anxiety     Therapy     marital therapy     Family History   Problem Relation Age of Onset    Anxiety disorder Mother     Colon cancer Father     No Known Problems Sister     No Known Problems Brother     No Known Problems Maternal Aunt     No Known Problems Maternal Uncle     No Known Problems Paternal Aunt     No Known Problems Paternal Uncle     Liver cancer Paternal Grandmother     Schizophrenia Maternal Grandmother     Bipolar disorder Daughter      Social History     Socioeconomic History    Marital status:      Spouse name: Heena     Number of children: 4    Years of education: Not on file    Highest education level: Not on file   Occupational History    Not on file   Social Needs    Financial resource strain: Not on file    Food insecurity     Worry: Not on file     Inability: Not on file    Transportation needs     Medical: Not on file     Non-medical: Not on file   Tobacco Use    Smoking status: Former Smoker     Types: Cigarettes     Quit date:      Years since quittin.8    Smokeless tobacco: Never Used   Substance and Sexual Activity    Alcohol use: Yes     Alcohol/week: 5.0 standard drinks     Types: 3 Cans of beer, 2 Shots of liquor per week    Drug use: No    Sexual activity: Yes     Partners: Female     Birth control/protection: See Surgical Hx   Lifestyle    Physical activity     Days per week: Not on file     Minutes per session: Not on file    Stress: Not on file   Relationships    Social connections      Talks on phone: Not on file     Gets together: Not on file     Attends Jainism service: Not on file     Active member of club or organization: Not on file     Attends meetings of clubs or organizations: Not on file     Relationship status: Not on file   Other Topics Concern    Patient feels they ought to cut down on drinking/drug use Not Asked    Patient annoyed by others criticizing their drinking/drug use Not Asked    Patient has felt bad or guilty about drinking/drug use Not Asked    Patient has had a drink/used drugs as an eye opener in the AM Not Asked   Social History Narrative     ([place], 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); 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, orthodontist     Past Surgical History:   Procedure Laterality Date    COLONOSCOPY      2017    NASAL SEPTUM SURGERY  1988    TONSILLECTOMY      1972 approx    VASECTOMY  2003       Labs:  Lab Results   Component Value Date    .10 (HH) 11/19/2020    .10 (HH) 11/19/2020    HGB 5.4 (LL) 11/19/2020    HGB 5.4 (LL) 11/19/2020    HCT 19.1 (LL) 11/19/2020    HCT 19.1 (LL) 11/19/2020     (H) 11/19/2020     (H) 11/19/2020    PLT 19 (LL) 11/19/2020    PLT 19 (LL) 11/19/2020     BMP  Lab Results   Component Value Date     11/19/2020     11/19/2020    K 4.2 11/19/2020    K 4.2 11/19/2020     11/19/2020     11/19/2020    CO2 27 11/19/2020    CO2 27 11/19/2020    BUN 21 (H) 11/19/2020    BUN 21 (H) 11/19/2020    CREATININE 1.1 11/19/2020    CREATININE 1.1 11/19/2020    CALCIUM 8.5 (L) 11/19/2020    CALCIUM 8.5 (L) 11/19/2020    ANIONGAP 6 (L) 11/19/2020    ANIONGAP 6 (L) 11/19/2020    ESTGFRAFRICA >60 11/19/2020    ESTGFRAFRICA >60 11/19/2020    EGFRNONAA >60 11/19/2020    EGFRNONAA >60 11/19/2020     Lab Results   Component Value Date    ALT 41 11/19/2020    ALT 41 11/19/2020    AST 28 11/19/2020    AST 28 11/19/2020    ALKPHOS 63 11/19/2020    ALKPHOS 63 11/19/2020    BILITOT 0.9 11/19/2020    BILITOT 0.9 11/19/2020       No results found  for: IRON, TIBC, FERRITIN, SATURATEDIRO  No results found for: [id number]  No results found for: FOLATE  No results found for: TSH      Review of Systems   Constitutional: Positive for activity change, appetite change and fatigue. Negative for chills, diaphoresis, fever and unexpected weight change.   HENT: Negative for congestion, dental problem, drooling, ear discharge, ear pain, facial swelling, hearing loss, mouth sores, nosebleeds, postnasal drip, rhinorrhea, sinus pressure, sneezing, sore throat, tinnitus, trouble swallowing and voice change.    Eyes: Negative for photophobia, pain, discharge, redness, itching and visual disturbance.   Respiratory: Positive for shortness of breath. Negative for apnea, cough, choking, chest tightness, wheezing and stridor.    Cardiovascular: Negative for chest pain, palpitations and leg swelling.   Gastrointestinal: Negative for abdominal distention, abdominal pain, anal bleeding, blood in stool, constipation, diarrhea, nausea, rectal pain and vomiting.   Endocrine: Negative for cold intolerance, heat intolerance, polydipsia, polyphagia and polyuria.   Genitourinary: Negative for decreased urine volume, difficulty urinating, discharge, dysuria, enuresis, flank pain, frequency, genital sores, hematuria, penile pain, penile swelling, scrotal swelling, testicular pain and urgency.   Musculoskeletal: Negative for arthralgias, back pain, gait problem, joint swelling, myalgias, neck pain and neck stiffness.   Skin: Negative for color change, pallor, rash and wound.   Allergic/Immunologic: Negative for environmental allergies, food allergies and immunocompromised state.   Neurological: Positive for weakness. Negative for dizziness, tremors, seizures, syncope, facial asymmetry, speech difficulty, light-headedness, numbness and headaches.   Hematological: Negative for adenopathy. Does not bruise/bleed easily.   Psychiatric/Behavioral: Positive for dysphoric mood. Negative for agitation,  behavioral problems, confusion, decreased concentration, hallucinations, self-injury, sleep disturbance and suicidal ideas. The patient is nervous/anxious. The patient is not hyperactive.        Objective:      Physical Exam  Vitals signs reviewed.   Constitutional:       General: He is not in acute distress.     Appearance: He is well-developed. He is not diaphoretic.   HENT:      Head: Normocephalic.      Right Ear: External ear normal.      Left Ear: External ear normal.      Nose: Nose normal.      Right Sinus: No maxillary sinus tenderness or frontal sinus tenderness.      Left Sinus: No maxillary sinus tenderness or frontal sinus tenderness.      Mouth/Throat:      Pharynx: No oropharyngeal exudate.   Eyes:      General: Lids are normal. No scleral icterus.        Right eye: No discharge.         Left eye: No discharge.      Extraocular Movements:      Right eye: Normal extraocular motion.      Left eye: Normal extraocular motion.      Conjunctiva/sclera:      Right eye: Right conjunctiva is not injected. No hemorrhage.     Left eye: Left conjunctiva is not injected. No hemorrhage.     Pupils: Pupils are equal, round, and reactive to light.   Neck:      Musculoskeletal: Normal range of motion and neck supple.      Thyroid: No thyromegaly.      Vascular: No JVD.      Trachea: No tracheal deviation.   Cardiovascular:      Rate and Rhythm: Normal rate and regular rhythm.      Heart sounds: Normal heart sounds.   Pulmonary:      Effort: Pulmonary effort is normal. No respiratory distress.      Breath sounds: Normal breath sounds. No stridor.   Abdominal:      General: Bowel sounds are normal.      Palpations: Abdomen is soft. There is no hepatomegaly, splenomegaly or mass.      Tenderness: There is no abdominal tenderness.   Musculoskeletal: Normal range of motion.         General: No tenderness.   Lymphadenopathy:      Head:      Right side of head: No posterior auricular or occipital adenopathy.      Left side  of head: No posterior auricular or occipital adenopathy.      Cervical: Cervical adenopathy present.      Right cervical: No superficial, deep or posterior cervical adenopathy.     Left cervical: No superficial, deep or posterior cervical adenopathy.      Upper Body:      Right upper body: No supraclavicular adenopathy.      Left upper body: No supraclavicular adenopathy.   Skin:     General: Skin is dry.      Findings: No erythema or rash.      Nails: There is no clubbing.     Neurological:      Mental Status: He is alert and oriented to person, place, and time.      Cranial Nerves: No cranial nerve deficit.      Coordination: Coordination normal.   Psychiatric:         Mood and Affect: Mood is anxious and depressed.         Behavior: Behavior normal.         Thought Content: Thought content normal.         Judgment: Judgment normal.             Assessment:      1. CLL (chronic lymphocytic leukemia)    2. Dyspnea, unspecified type    3. Encounter for screening for malignant neoplasm of other sites           Plan:   Extensive conversation with the patient at this point reviewed laboratory studies today dramatic change in white cell count hemoglobin and platelet.  Will check for hemolysis.  In addition for progression CT chest abdomen pelvis transfuse 2 units of packed red cells.  Better extensive conversation at this point as to whether not the patient is to be treated here and [facility] versus [facility].  The patient will be deciding this over the next 48-72 hours.  In the meantime as laboratory studies much sure I will be in touch with the patient and back with Hematology Oncology subject matter expert Dr. Arnel Chen.  For review.  40 min face-to-face time coordination of care greater 50% time face-to-face with patient 1500.  Patient informed of positive Tyra start on prednisone 1 milligram/kilogram 100 mg daily with tapers response to treatment will try for transfusion tomorrow if compatible blood is  available.  Patient notified; note from Dr. Diaz included in this note from [facility],     I did a quick review of the labs. Looks like either progressive disease or autoimmune manifestations of CLL. Either way, he would be appropriate for therapy.     My recommendations for therapy-naive CLL are as follows:     1) Consideration of participating in clinical trial [id number] here at [facility]. This is a randomized study of ibrutinib + obinutuzumab vs ibrutinib + obinutuzumab + venetoclax. Therapy is sponsored by the trial. If he is interested in a study that may advance the standard of care, this would be a good choice (only caveat is he has to come here).     2) Ibrutinib monotherapy     3) acalabrutinib +/- obinutuzumab     4) venetoclax + obinutuzumab     All of the options above are appropriate, and no one is superior to the other based on current knowledge. I am happy to see him and discuss as well if he would like to come here for a second opinion. Let me know how I can help.     - Arnel Georges Jr, MD FACP

## 2020-11-19 NOTE — TELEPHONE ENCOUNTER
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

## 2020-11-20 ENCOUNTER — INFUSION (OUTPATIENT)
Dept: INFUSION THERAPY | Facility: HOSPITAL | Age: 60
End: 2020-11-20
Attending: INTERNAL MEDICINE
Payer: COMMERCIAL

## 2020-11-20 ENCOUNTER — PATIENT MESSAGE (OUTPATIENT)
Dept: HEMATOLOGY/ONCOLOGY | Facility: CLINIC | Age: 60
End: 2020-11-20

## 2020-11-20 VITALS
TEMPERATURE: 98 F | SYSTOLIC BLOOD PRESSURE: 117 MMHG | DIASTOLIC BLOOD PRESSURE: 53 MMHG | OXYGEN SATURATION: 98 % | RESPIRATION RATE: 18 BRPM | HEART RATE: 61 BPM

## 2020-11-20 DIAGNOSIS — C91.10 CLL (CHRONIC LYMPHOCYTIC LEUKEMIA): Primary | ICD-10-CM

## 2020-11-20 DIAGNOSIS — C91.10 CLL (CHRONIC LYMPHOCYTIC LEUKEMIA): ICD-10-CM

## 2020-11-20 DIAGNOSIS — D59.10 AIHA (AUTOIMMUNE HEMOLYTIC ANEMIA): ICD-10-CM

## 2020-11-20 DIAGNOSIS — R06.00 DYSPNEA, UNSPECIFIED TYPE: ICD-10-CM

## 2020-11-20 DIAGNOSIS — Z12.89 ENCOUNTER FOR SCREENING FOR MALIGNANT NEOPLASM OF OTHER SITES: ICD-10-CM

## 2020-11-20 PROCEDURE — 36430 TRANSFUSION BLD/BLD COMPNT: CPT

## 2020-11-20 PROCEDURE — 25000003 PHARM REV CODE 250: Performed by: INTERNAL MEDICINE

## 2020-11-20 RX ORDER — HYDROCODONE BITARTRATE AND ACETAMINOPHEN 500; 5 MG/1; MG/1
TABLET ORAL ONCE
Status: COMPLETED | OUTPATIENT
Start: 2020-11-20 | End: 2020-11-20

## 2020-11-20 RX ORDER — DIPHENHYDRAMINE HCL 25 MG
25 CAPSULE ORAL
Status: DISCONTINUED | OUTPATIENT
Start: 2020-11-20 | End: 2020-11-20 | Stop reason: HOSPADM

## 2020-11-20 RX ORDER — HYDROCODONE BITARTRATE AND ACETAMINOPHEN 500; 5 MG/1; MG/1
TABLET ORAL ONCE
Status: DISCONTINUED | OUTPATIENT
Start: 2020-11-20 | End: 2020-11-20 | Stop reason: HOSPADM

## 2020-11-20 RX ORDER — PREDNISONE 20 MG/1
100 TABLET ORAL DAILY
Qty: 120 TABLET | Refills: 11 | Status: SHIPPED | OUTPATIENT
Start: 2020-11-20 | End: 2023-04-21

## 2020-11-20 RX ORDER — ACETAMINOPHEN 325 MG/1
650 TABLET ORAL
Status: COMPLETED | OUTPATIENT
Start: 2020-11-20 | End: 2020-11-20

## 2020-11-20 RX ORDER — ACETAMINOPHEN 325 MG/1
650 TABLET ORAL
Status: DISCONTINUED | OUTPATIENT
Start: 2020-11-20 | End: 2020-11-20 | Stop reason: HOSPADM

## 2020-11-20 RX ORDER — DIPHENHYDRAMINE HCL 25 MG
25 CAPSULE ORAL
Status: COMPLETED | OUTPATIENT
Start: 2020-11-20 | End: 2020-11-20

## 2020-11-20 RX ADMIN — SODIUM CHLORIDE: 0.9 INJECTION, SOLUTION INTRAVENOUS at 09:11

## 2020-11-20 RX ADMIN — ACETAMINOPHEN 650 MG: 325 TABLET ORAL at 09:11

## 2020-11-20 RX ADMIN — DIPHENHYDRAMINE HYDROCHLORIDE 25 MG: 25 CAPSULE ORAL at 09:11

## 2020-11-20 NOTE — PLAN OF CARE
Problem: Adult Inpatient Plan of Care  Goal: Plan of Care Review  Outcome: Ongoing, Progressing  Flowsheets (Taken 11/20/2020 0929)  Plan of Care Reviewed With: patient  Goal: Patient-Specific Goal (Individualization)  Outcome: Ongoing, Progressing  Flowsheets (Taken 11/20/2020 0929)  Individualized Care Needs: pt like leg elevated . Do not need warm blanket  Anxieties, Fears or Concerns: none   Pt stated feeling weak and tired.

## 2020-11-25 ENCOUNTER — LAB VISIT (OUTPATIENT)
Dept: LAB | Facility: HOSPITAL | Age: 60
End: 2020-11-25
Attending: INTERNAL MEDICINE
Payer: COMMERCIAL

## 2020-11-25 ENCOUNTER — PATIENT MESSAGE (OUTPATIENT)
Dept: HEMATOLOGY/ONCOLOGY | Facility: CLINIC | Age: 60
End: 2020-11-25

## 2020-11-25 ENCOUNTER — OFFICE VISIT (OUTPATIENT)
Dept: HEMATOLOGY/ONCOLOGY | Facility: CLINIC | Age: 60
End: 2020-11-25
Payer: COMMERCIAL

## 2020-11-25 VITALS
DIASTOLIC BLOOD PRESSURE: 67 MMHG | SYSTOLIC BLOOD PRESSURE: 122 MMHG | OXYGEN SATURATION: 98 % | WEIGHT: 222 LBS | TEMPERATURE: 98 F | HEIGHT: 74 IN | HEART RATE: 69 BPM | BODY MASS INDEX: 28.49 KG/M2

## 2020-11-25 DIAGNOSIS — D64.9 NORMOCYTIC ANEMIA: ICD-10-CM

## 2020-11-25 DIAGNOSIS — C91.10 CLL (CHRONIC LYMPHOCYTIC LEUKEMIA): Primary | ICD-10-CM

## 2020-11-25 DIAGNOSIS — C91.10 CLL (CHRONIC LYMPHOCYTIC LEUKEMIA): ICD-10-CM

## 2020-11-25 LAB
BASOPHILS # BLD AUTO: 0.05 K/UL (ref 0–0.2)
BASOPHILS NFR BLD: 0 % (ref 0–1.9)
DIFFERENTIAL METHOD: ABNORMAL
EOSINOPHIL # BLD AUTO: 0.1 K/UL (ref 0–0.5)
EOSINOPHIL NFR BLD: 0.1 % (ref 0–8)
ERYTHROCYTE [DISTWIDTH] IN BLOOD BY AUTOMATED COUNT: 15.9 % (ref 11.5–14.5)
HCT VFR BLD AUTO: 23.4 % (ref 40–54)
HGB BLD-MCNC: 6.8 G/DL (ref 14–18)
IMM GRANULOCYTES # BLD AUTO: 0.44 K/UL (ref 0–0.04)
IMM GRANULOCYTES NFR BLD AUTO: 0.3 % (ref 0–0.5)
LYMPHOCYTES # BLD AUTO: ABNORMAL K/UL (ref 1–4.8)
LYMPHOCYTES NFR BLD: 96.4 % (ref 18–48)
MCH RBC QN AUTO: 29.3 PG (ref 27–31)
MCHC RBC AUTO-ENTMCNC: 29.1 G/DL (ref 32–36)
MCV RBC AUTO: 101 FL (ref 82–98)
MONOCYTES # BLD AUTO: 2.4 K/UL (ref 0.3–1)
MONOCYTES NFR BLD: 1.8 % (ref 4–15)
NEUTROPHILS # BLD AUTO: 1.9 K/UL (ref 1.8–7.7)
NEUTROPHILS NFR BLD: 1.4 % (ref 38–73)
NRBC BLD-RTO: 0 /100 WBC
PLATELET # BLD AUTO: 21 K/UL (ref 150–350)
PMV BLD AUTO: 11.2 FL (ref 9.2–12.9)
RBC # BLD AUTO: 2.32 M/UL (ref 4.6–6.2)
WBC # BLD AUTO: 134.4 K/UL (ref 3.9–12.7)

## 2020-11-25 PROCEDURE — 99214 OFFICE O/P EST MOD 30 MIN: CPT | Mod: 25,S$GLB,, | Performed by: INTERNAL MEDICINE

## 2020-11-25 PROCEDURE — 99999 PR PBB SHADOW E&M-EST. PATIENT-LVL III: ICD-10-PCS | Mod: PBBFAC,,, | Performed by: INTERNAL MEDICINE

## 2020-11-25 PROCEDURE — 1126F PR PAIN SEVERITY QUANTIFIED, NO PAIN PRESENT: ICD-10-PCS | Mod: S$GLB,,, | Performed by: INTERNAL MEDICINE

## 2020-11-25 PROCEDURE — 3008F PR BODY MASS INDEX (BMI) DOCUMENTED: ICD-10-PCS | Mod: CPTII,S$GLB,, | Performed by: INTERNAL MEDICINE

## 2020-11-25 PROCEDURE — 99214 PR OFFICE/OUTPT VISIT, EST, LEVL IV, 30-39 MIN: ICD-10-PCS | Mod: 25,S$GLB,, | Performed by: INTERNAL MEDICINE

## 2020-11-25 PROCEDURE — 36415 COLL VENOUS BLD VENIPUNCTURE: CPT

## 2020-11-25 PROCEDURE — 3008F BODY MASS INDEX DOCD: CPT | Mod: CPTII,S$GLB,, | Performed by: INTERNAL MEDICINE

## 2020-11-25 PROCEDURE — 99999 PR PBB SHADOW E&M-EST. PATIENT-LVL III: CPT | Mod: PBBFAC,,, | Performed by: INTERNAL MEDICINE

## 2020-11-25 PROCEDURE — 85025 COMPLETE CBC W/AUTO DIFF WBC: CPT

## 2020-11-25 PROCEDURE — 1126F AMNT PAIN NOTED NONE PRSNT: CPT | Mod: S$GLB,,, | Performed by: INTERNAL MEDICINE

## 2020-11-25 NOTE — PROGRESS NOTES
Subjective:       Patient ID: Rocky Esquivel is a 60 y.o. male.    Chief Complaint: Results and Leukemia    HPI 60-year-old male history of chronic lymphocytic leukemia stage 0 previously patient found to have deteriorating counts with increased white count hemoglobin and platelet patient returns after 5 day trial of prednisone 100 mg per day for response    Past Medical History:   Diagnosis Date    Allergy     as a child    Anxiety     Therapy     marital therapy     Family History   Problem Relation Age of Onset    Anxiety disorder Mother     Colon cancer Father     No Known Problems Sister     No Known Problems Brother     No Known Problems Maternal Aunt     No Known Problems Maternal Uncle     No Known Problems Paternal Aunt     No Known Problems Paternal Uncle     Liver cancer Paternal Grandmother     Schizophrenia Maternal Grandmother     Bipolar disorder Daughter      Social History     Socioeconomic History    Marital status:      Spouse name: Heena     Number of children: 4    Years of education: Not on file    Highest education level: Not on file   Occupational History    Not on file   Social Needs    Financial resource strain: Not on file    Food insecurity     Worry: Not on file     Inability: Not on file    Transportation needs     Medical: Not on file     Non-medical: Not on file   Tobacco Use    Smoking status: Former Smoker     Types: Cigarettes     Quit date:      Years since quittin.9    Smokeless tobacco: Never Used   Substance and Sexual Activity    Alcohol use: Yes     Alcohol/week: 5.0 standard drinks     Types: 3 Cans of beer, 2 Shots of liquor per week    Drug use: No    Sexual activity: Yes     Partners: Female     Birth control/protection: See Surgical Hx   Lifestyle    Physical activity     Days per week: Not on file     Minutes per session: Not on file    Stress: Not on file   Relationships    Social connections     Talks on phone: Not  on file     Gets together: Not on file     Attends Pentecostal service: Not on file     Active member of club or organization: Not on file     Attends meetings of clubs or organizations: Not on file     Relationship status: Not on file   Other Topics Concern    Patient feels they ought to cut down on drinking/drug use Not Asked    Patient annoyed by others criticizing their drinking/drug use Not Asked    Patient has felt bad or guilty about drinking/drug use Not Asked    Patient has had a drink/used drugs as an eye opener in the AM Not Asked   Social History Narrative     ([place], 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); 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, orthodontist     Past Surgical History:   Procedure Laterality Date    COLONOSCOPY      2017    NASAL SEPTUM SURGERY  1988    TONSILLECTOMY      1972 approx    VASECTOMY  2003       Labs:  Lab Results   Component Value Date    .40 (HH) 11/25/2020    HGB 6.8 (L) 11/25/2020    HCT 23.4 (L) 11/25/2020     (H) 11/25/2020    PLT 21 (LL) 11/25/2020     BMP  Lab Results   Component Value Date     11/19/2020     11/19/2020    K 4.2 11/19/2020    K 4.2 11/19/2020     11/19/2020     11/19/2020    CO2 27 11/19/2020    CO2 27 11/19/2020    BUN 21 (H) 11/19/2020    BUN 21 (H) 11/19/2020    CREATININE 1.1 11/19/2020    CREATININE 1.1 11/19/2020    CALCIUM 8.5 (L) 11/19/2020    CALCIUM 8.5 (L) 11/19/2020    ANIONGAP 6 (L) 11/19/2020    ANIONGAP 6 (L) 11/19/2020    ESTGFRAFRICA >60 11/19/2020    ESTGFRAFRICA >60 11/19/2020    EGFRNONAA >60 11/19/2020    EGFRNONAA >60 11/19/2020     Lab Results   Component Value Date    ALT 41 11/19/2020    ALT 41 11/19/2020    AST 28 11/19/2020    AST 28 11/19/2020    ALKPHOS 63 11/19/2020    ALKPHOS 63 11/19/2020    BILITOT 0.9 11/19/2020    BILITOT 0.9 11/19/2020       Lab Results   Component Value Date    IRON 249 (H) 11/19/2020    TIBC 296 11/19/2020    FERRITIN 285 11/19/2020     No results found for: [id number]  No results found for:  FOLATE  No results found for: TSH      Review of Systems   Constitutional: Positive for fatigue. Negative for activity change, appetite change, chills, diaphoresis, fever and unexpected weight change.   HENT: Negative for congestion, dental problem, drooling, ear discharge, ear pain, facial swelling, hearing loss, mouth sores, nosebleeds, postnasal drip, rhinorrhea, sinus pressure, sneezing, sore throat, tinnitus, trouble swallowing and voice change.    Eyes: Negative for photophobia, pain, discharge, redness, itching and visual disturbance.   Respiratory: Negative for apnea, cough, choking, chest tightness, shortness of breath, wheezing and stridor.    Cardiovascular: Negative for chest pain, palpitations and leg swelling.   Gastrointestinal: Negative for abdominal distention, abdominal pain, anal bleeding, blood in stool, constipation, diarrhea, nausea, rectal pain and vomiting.   Endocrine: Negative for cold intolerance, heat intolerance, polydipsia, polyphagia and polyuria.   Genitourinary: Negative for decreased urine volume, difficulty urinating, discharge, dysuria, enuresis, flank pain, frequency, genital sores, hematuria, penile pain, penile swelling, scrotal swelling, testicular pain and urgency.   Musculoskeletal: Negative for arthralgias, back pain, gait problem, joint swelling, myalgias, neck pain and neck stiffness.   Skin: Negative for color change, pallor, rash and wound.        Ecchymoses on arm   Allergic/Immunologic: Negative for environmental allergies, food allergies and immunocompromised state.   Neurological: Positive for weakness. Negative for dizziness, tremors, seizures, syncope, facial asymmetry, speech difficulty, light-headedness, numbness and headaches.   Hematological: Negative for adenopathy. Does not bruise/bleed easily.   Psychiatric/Behavioral: Positive for dysphoric mood. Negative for agitation, behavioral problems, confusion, decreased concentration, hallucinations, self-injury,  sleep disturbance and suicidal ideas. The patient is nervous/anxious. The patient is not hyperactive.        Objective:      Physical Exam  Vitals signs reviewed.   Constitutional:       General: He is not in acute distress.     Appearance: He is well-developed. He is not diaphoretic.   HENT:      Head: Normocephalic.      Right Ear: External ear normal.      Left Ear: External ear normal.      Nose: Nose normal.      Right Sinus: No maxillary sinus tenderness or frontal sinus tenderness.      Left Sinus: No maxillary sinus tenderness or frontal sinus tenderness.      Mouth/Throat:      Pharynx: No oropharyngeal exudate.   Eyes:      General: Lids are normal. No scleral icterus.        Right eye: No discharge.         Left eye: No discharge.      Extraocular Movements:      Right eye: Normal extraocular motion.      Left eye: Normal extraocular motion.      Conjunctiva/sclera:      Right eye: Right conjunctiva is not injected. No hemorrhage.     Left eye: Left conjunctiva is not injected. No hemorrhage.     Pupils: Pupils are equal, round, and reactive to light.   Neck:      Musculoskeletal: Normal range of motion and neck supple.      Thyroid: No thyromegaly.      Vascular: No JVD.      Trachea: No tracheal deviation.   Cardiovascular:      Rate and Rhythm: Normal rate.   Pulmonary:      Effort: Pulmonary effort is normal. No respiratory distress.      Breath sounds: No stridor.   Abdominal:      General: Bowel sounds are normal.      Palpations: Abdomen is soft. There is no hepatomegaly, splenomegaly or mass.      Tenderness: There is no abdominal tenderness.   Musculoskeletal: Normal range of motion.         General: No tenderness.   Lymphadenopathy:      Head:      Right side of head: No posterior auricular or occipital adenopathy.      Left side of head: No posterior auricular or occipital adenopathy.      Cervical: Cervical adenopathy present.      Right cervical: Superficial cervical adenopathy, deep cervical  adenopathy and posterior cervical adenopathy present.      Left cervical: Superficial cervical adenopathy, deep cervical adenopathy and posterior cervical adenopathy present.      Upper Body:      Right upper body: No supraclavicular adenopathy.      Left upper body: No supraclavicular adenopathy.   Skin:     General: Skin is dry.      Findings: No erythema or rash.      Nails: There is no clubbing.     Neurological:      Mental Status: He is alert and oriented to person, place, and time.      Cranial Nerves: No cranial nerve deficit.      Coordination: Coordination normal.   Psychiatric:         Behavior: Behavior normal.         Thought Content: Thought content normal.         Judgment: Judgment normal.             Assessment:      1. CLL (chronic lymphocytic leukemia)    2. Normocytic anemia           Plan:     Progressive chronic lymphocytic leukemia with elevated white cell count hemoglobin and platelets patient had positive Tyra with no evidence of hemolysis with normal LDH as well as haptoglobin.  At this point will reduce prednisone 80 mg per day very little change in platelet count discussed case with Dr. Arnel Chen leukemia subject matter expert [facility].  Patient will be seen either physical a virtual visit scheduled for [place] visit on 12/17/2020.  Told that I would like to see if this could be expedited had extensive conversation with patient over various treatment recommendations.  Await Dr. Chen review and [place]'s review.  Will see back next week with CBC CMP if patient develops fever to please call immediately ago to the emergency room [facility].  25 min face-to-face time coordination of care greater 50% time face-to-face with patient        Shakeel Georges Jr, MD FACP

## 2020-11-30 ENCOUNTER — OFFICE VISIT (OUTPATIENT)
Dept: HEMATOLOGY/ONCOLOGY | Facility: CLINIC | Age: 60
End: 2020-11-30
Payer: COMMERCIAL

## 2020-11-30 DIAGNOSIS — C91.10 CLL (CHRONIC LYMPHOCYTIC LEUKEMIA): Primary | ICD-10-CM

## 2020-11-30 DIAGNOSIS — D63.0 ANEMIA IN NEOPLASTIC DISEASE: ICD-10-CM

## 2020-11-30 DIAGNOSIS — D69.6 THROMBOCYTOPENIA: ICD-10-CM

## 2020-11-30 PROCEDURE — 99214 PR OFFICE/OUTPT VISIT, EST, LEVL IV, 30-39 MIN: ICD-10-PCS | Mod: 95,,, | Performed by: INTERNAL MEDICINE

## 2020-11-30 PROCEDURE — 99214 OFFICE O/P EST MOD 30 MIN: CPT | Mod: 95,,, | Performed by: INTERNAL MEDICINE

## 2020-11-30 NOTE — LETTER
December 2, 2020      Shakeel Georges MD  [id number] [address] [address] [facility]  [place] [address]           [facility]  [address]  [facility] [address]  Phone: [phone]          Patient: Rocky Esquivel   MR Number: [id number]   YOB: 1960   Date of Visit: 11/30/2020       Dear Dr. Shakeel Georges:    Thank you for referring Rocky Esquivel to me for evaluation. Attached you will find relevant portions of my assessment and plan of care.    If you have questions, please do not hesitate to call me. I look forward to following Rocky Esquivel along with you.    Sincerely,    Arnel Chen MD    Enclosure  CC:  No Recipients    If you would like to receive this communication electronically, please contact [email or web address] or [phone] to request more information on [email or web address] Link access.    For providers and/or their staff who would like to refer a patient to [facility], please contact us through our one-stop-shop provider referral line, [facility] , at [phone].    If you feel you have received this communication in error or would no longer like to receive these types of communications, please e-mail [email or web address]

## 2020-12-02 ENCOUNTER — TELEPHONE (OUTPATIENT)
Dept: HEMATOLOGY/ONCOLOGY | Facility: CLINIC | Age: 60
End: 2020-12-02

## 2020-12-02 NOTE — TELEPHONE ENCOUNTER
"    Wednesday, December 2nd, 2020     Protocol: [id number]- A Randomized Phase III Study of the Addition of Venetoclax to Ibrutinib and Obinutuzumab versus Ibrutinib and Obinutuzumab in Untreated Younger Patients with Chronic Lymphocytic Leukemia (CLL)   Sponsor: [employer]  IRB#: [id number]  MD: VIRGEN Chen    Telephone    Reached out to patient at request of Dr. Chen to disucss patient's interest in above-mentioned trial.  He states at this time he wishes to have informed consent form emailed to him so he can "digest" details of the study.  He further states he has a planned trip to [place] to discuss treatment options with his established physician there.  He was informed that the above trial is open to enrollment in [place], so he could stay there under the care of Dr. Georges and be treated on trial.  He states he will review form and reach back out to us if he remains interested in participating.  Will discuss above with Dr. Georges and Research RN in [facility].  "

## 2020-12-02 NOTE — PROGRESS NOTES
HEMATOLOGIC MALIGNANCIES PROGRESS NOTE    IDENTIFYING STATEMENT   Filiberto Esquivel (FILIBERTO) is a 60 y.o. male with a  of 1960 from [place] with the diagnosis of CLL.      TELEMEDICINE DOCUMENTATIONU    The patient location is: [place]  The chief complaint leading to consultation is: CLL    Visit type: audiovisual    Face to Face time with patient: 20 minutes  40 minutes of total time spent on the encounter, which includes face to face time and non-face to face time preparing to see the patient (eg, review of tests), Obtaining and/or reviewing separately obtained history, Documenting clinical information in the electronic or other health record, Independently interpreting results (not separately reported) and communicating results to the patient/family/caregiver, or Care coordination (not separately reported).         Each patient to whom he or she provides medical services by telemedicine is:  (1) informed of the relationship between the physician and patient and the respective role of any other health care provider with respect to management of the patient; and (2) notified that he or she may decline to receive medical services by telemedicine and may withdraw from such care at any time.    Notes:       ONCOLOGY HISTORY:    1. Chronic lymphocytic leukemia              A. 2018: Evaluated by Dr. Georges in [place] after being referred from pre-operative evaluation for high WBC. WBC 60.8 with 84% lymphocytes. Peripheral blood flow cytometry consistent with CLL.               B. 2018: CLL FISH shows no abnormalities. CT chest, abdomen, pelvis - Numerous enlarged periportal, pericelia, periaortic, and pericaval lymph nodes. Representative nodes - 4 x 3.7 cm in portacaval space; 2.4 x 1.4 cm in aortocaval space. Multiple enlarged nodes present along both ilaic chains in the pelvic sidewalls with representative nodes measuring 3.1 x 2.2 cm on right and 3 x 1.9 cm on left. Borderline  enlarged nodes measuring on antonio order of 1 cm short axis in both inguinal regions. Several scattered subcentimeter hypodense foci in the liver; 10 mm focus of nodular hyperenhancement is noted adjacent o gallbaldder fossa. Spleen is enlarged measuring 17 cm in cradniocaudal length.               C. 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 TP53 mutation analysis shows no mutations.      2. 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 He has lately been getting very anemic and thrombocytopenic. He denies lymphadenopathy. He has many questions about what this means for his disease status and whether or not he needs therapy.     Past Medical History, Past Social History and Past Family History have been reviewed and are unchanged except as noted in the interval history.    MEDICATIONS:     Current Outpatient Medications on File Prior to Visit   Medication Sig Dispense Refill    cholecalciferol, vitamin D3, 5,000 unit Tab Take 400 Units by mouth.      folic acid (FOLVITE) 1 MG tablet Take 1 tablet (1 mg total) by mouth once daily. 100 tablet 3    hydrocortisone 2.5 % lotion Apply 2.5 applicators topically 2 (two) times daily.      predniSONE (DELTASONE) 20 MG tablet Take 5 tablets (100 mg total) by mouth once daily. 120 tablet 11    tamsulosin (FLOMAX) 0.4 mg Cap Take 0.4 mg by mouth.      turmeric, bulk, 95 % Powd Take by mouth.       No current facility-administered medications on file prior to visit.        ALLERGIES: Review of patient's allergies indicates:  No Known Allergies      ROS:       Review of Systems   Constitutional: Positive for fatigue. Negative for diaphoresis, fever and unexpected weight change.   HENT:   Negative for lump/mass and sore throat.    Eyes: Negative for icterus.   Respiratory: Positive for shortness of breath. Negative for cough.    Cardiovascular: Negative for chest pain and palpitations.   Gastrointestinal: Negative for abdominal distention, constipation, diarrhea, nausea and vomiting.   Genitourinary: Negative for dysuria and frequency.    Musculoskeletal: Negative for arthralgias, gait problem and myalgias.   Skin: Negative for rash.   Neurological: Negative for dizziness, gait problem and headaches.   Hematological: Negative for adenopathy. Does not bruise/bleed easily.   Psychiatric/Behavioral: The patient is not nervous/anxious.        PHYSICAL EXAM:  There were no vitals filed for this visit.    Physical Exam  Head and neck visualized and no abnormalities seen.     LAB:   Results for orders placed or performed in visit on 11/25/20   CBC Auto Differential   Result Value Ref Range    .40 (HH) 3.90 - 12.70 K/uL    RBC 2.32 (L) 4.60 - 6.20 M/uL    Hemoglobin 6.8 (L) 14.0 - 18.0 g/dL    Hematocrit 23.4 (L) 40.0 - 54.0 %     (H) 82 - 98 fL    MCH 29.3 27.0 - 31.0 pg    MCHC 29.1 (L) 32.0 - 36.0 g/dL    RDW 15.9 (H) 11.5 - 14.5 %    Platelets 21 (LL) 150 - 350 K/uL    MPV 11.2 9.2 - 12.9 fL    Immature Granulocytes 0.3 0.0 - 0.5 %    Gran # (ANC) 1.9 1.8 - 7.7 K/uL    Immature Grans (Abs) 0.44 (H) 0.00 - 0.04 K/uL    Lymph # @rna 1.0 - 4.8 K/uL    Mono # 2.4 (H) 0.3 - 1.0 K/uL    Eos # 0.1 0.0 - 0.5 K/uL    Baso # 0.05 0.00 - 0.20 K/uL    nRBC 0 0 /100 WBC    Gran % 1.4 (L) 38.0 - 73.0 %    Lymph % 96.4 (H) 18.0 - 48.0 %    Mono % 1.8 (L) 4.0 - 15.0 %    Eosinophil % 0.1 0.0 - 8.0 %    Basophil % 0.0 0.0 - 1.9 %    Differential Method Automated        PROBLEMS ASSESSED THIS VISIT:    1. CLL (chronic lymphocytic leukemia)    2. Anemia in neoplastic  disease    3. 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 We discussed that this is an indication for therapy.     He is IGHV unmutated, and we reviewed that small molecule inhibitor therapy is generally preferred to cytotoxic chemotherapy in this setting.     We reviewed that there are several options that can be considered as standard of care for first line therapy with CLL. These recommendations are in accordance with NCCN guidelines.    1) Ibrutinib    OR     2) Acalabrutinib +/- obinutuzumab    OR     3) venetoclax + obinutuzumab    We also discussed participation in the [id number] clinical trial, given that there exists uncertainty as to the optimal first choice of therapy, combination of therapy and sequencing of therapy. He is interested in this. This trial is open in [place] and Dr. Georges may serve as subinvestigator. He has been contacted by our clinical research coordinators about this as an option as well.    He will consider his options, as well as get an additional opinion from [facility] (though he expresses preference for local treatment) and discuss further with either myself or Dr. Georges.     Follow-up  Pending further decisions regarding therapy    Arnel Chen MD  Hematology and Stem Cell Transplant

## 2020-12-03 ENCOUNTER — LAB VISIT (OUTPATIENT)
Dept: LAB | Facility: HOSPITAL | Age: 60
End: 2020-12-03
Attending: INTERNAL MEDICINE
Payer: COMMERCIAL

## 2020-12-03 ENCOUNTER — PATIENT MESSAGE (OUTPATIENT)
Dept: HEMATOLOGY/ONCOLOGY | Facility: CLINIC | Age: 60
End: 2020-12-03

## 2020-12-03 ENCOUNTER — TELEPHONE (OUTPATIENT)
Dept: HEMATOLOGY/ONCOLOGY | Facility: CLINIC | Age: 60
End: 2020-12-03

## 2020-12-03 ENCOUNTER — OFFICE VISIT (OUTPATIENT)
Dept: HEMATOLOGY/ONCOLOGY | Facility: CLINIC | Age: 60
End: 2020-12-03
Payer: COMMERCIAL

## 2020-12-03 DIAGNOSIS — D61.818 PANCYTOPENIA: ICD-10-CM

## 2020-12-03 DIAGNOSIS — C91.10 CLL (CHRONIC LYMPHOCYTIC LEUKEMIA): ICD-10-CM

## 2020-12-03 DIAGNOSIS — F51.02 ADJUSTMENT INSOMNIA: ICD-10-CM

## 2020-12-03 DIAGNOSIS — C91.10 CLL (CHRONIC LYMPHOCYTIC LEUKEMIA): Primary | ICD-10-CM

## 2020-12-03 LAB
ALBUMIN SERPL BCP-MCNC: 4.2 G/DL (ref 3.5–5.2)
ALP SERPL-CCNC: 41 U/L (ref 55–135)
ALT SERPL W/O P-5'-P-CCNC: 25 U/L (ref 10–44)
ANION GAP SERPL CALC-SCNC: 8 MMOL/L (ref 8–16)
AST SERPL-CCNC: 14 U/L (ref 10–40)
BASOPHILS # BLD AUTO: ABNORMAL K/UL (ref 0–0.2)
BASOPHILS NFR BLD: 0 % (ref 0–1.9)
BILIRUB SERPL-MCNC: 1.2 MG/DL (ref 0.1–1)
BUN SERPL-MCNC: 21 MG/DL (ref 6–20)
CALCIUM SERPL-MCNC: 8.5 MG/DL (ref 8.7–10.5)
CHLORIDE SERPL-SCNC: 104 MMOL/L (ref 95–110)
CO2 SERPL-SCNC: 26 MMOL/L (ref 23–29)
CREAT SERPL-MCNC: 1.1 MG/DL (ref 0.5–1.4)
DIFFERENTIAL METHOD: ABNORMAL
EOSINOPHIL # BLD AUTO: ABNORMAL K/UL (ref 0–0.5)
EOSINOPHIL NFR BLD: 0 % (ref 0–8)
ERYTHROCYTE [DISTWIDTH] IN BLOOD BY AUTOMATED COUNT: 18.7 % (ref 11.5–14.5)
EST. GFR  (AFRICAN AMERICAN): >60 ML/MIN/1.73 M^2
EST. GFR  (NON AFRICAN AMERICAN): >60 ML/MIN/1.73 M^2
GLUCOSE SERPL-MCNC: 137 MG/DL (ref 70–110)
HCT VFR BLD AUTO: 22.9 % (ref 40–54)
HGB BLD-MCNC: 6.4 G/DL (ref 14–18)
IMM GRANULOCYTES # BLD AUTO: ABNORMAL K/UL (ref 0–0.04)
IMM GRANULOCYTES NFR BLD AUTO: ABNORMAL % (ref 0–0.5)
LYMPHOCYTES # BLD AUTO: ABNORMAL K/UL (ref 1–4.8)
LYMPHOCYTES NFR BLD: 92 % (ref 18–48)
MCH RBC QN AUTO: 29 PG (ref 27–31)
MCHC RBC AUTO-ENTMCNC: 27.9 G/DL (ref 32–36)
MCV RBC AUTO: 104 FL (ref 82–98)
MONOCYTES # BLD AUTO: ABNORMAL K/UL (ref 0.3–1)
MONOCYTES NFR BLD: 0 % (ref 4–15)
NEUTROPHILS # BLD AUTO: ABNORMAL K/UL (ref 1.8–7.7)
NEUTROPHILS NFR BLD: 8 % (ref 38–73)
NRBC BLD-RTO: 0 /100 WBC
PLATELET # BLD AUTO: 93 K/UL (ref 150–350)
PMV BLD AUTO: 11.9 FL (ref 9.2–12.9)
POTASSIUM SERPL-SCNC: 4.4 MMOL/L (ref 3.5–5.1)
PROT SERPL-MCNC: 6.1 G/DL (ref 6–8.4)
RBC # BLD AUTO: 2.21 M/UL (ref 4.6–6.2)
SODIUM SERPL-SCNC: 138 MMOL/L (ref 136–145)
WBC # BLD AUTO: 76.31 K/UL (ref 3.9–12.7)

## 2020-12-03 PROCEDURE — 85060 BLOOD SMEAR INTERPRETATION: CPT | Mod: ,,, | Performed by: PATHOLOGY

## 2020-12-03 PROCEDURE — 85027 COMPLETE CBC AUTOMATED: CPT

## 2020-12-03 PROCEDURE — 80053 COMPREHEN METABOLIC PANEL: CPT

## 2020-12-03 PROCEDURE — 99213 PR OFFICE/OUTPT VISIT, EST, LEVL III, 20-29 MIN: ICD-10-PCS | Mod: 95,,, | Performed by: INTERNAL MEDICINE

## 2020-12-03 PROCEDURE — 99213 OFFICE O/P EST LOW 20 MIN: CPT | Mod: 95,,, | Performed by: INTERNAL MEDICINE

## 2020-12-03 PROCEDURE — 85007 BL SMEAR W/DIFF WBC COUNT: CPT

## 2020-12-03 PROCEDURE — 36415 COLL VENOUS BLD VENIPUNCTURE: CPT

## 2020-12-03 PROCEDURE — 85060 PATHOLOGIST REVIEW: ICD-10-PCS | Mod: ,,, | Performed by: PATHOLOGY

## 2020-12-03 NOTE — PROGRESS NOTES
Subjective:       Patient ID: Rocky Esquivel is a 60 y.o. male.    Chief Complaint: Results (Chronic lymphocytic leukemia)    HPI 60-year-old male recent diagnosis of progressive CLL recently seen by leukemia lymphoma Service Dr. Arnel Chen.  Patient had CBC CMP performed today currently on prednisone 80 mg to see whether not autoimmune well as a thrombocytopenia    Past Medical History:   Diagnosis Date    Allergy     as a child    Anxiety     Therapy     marital therapy     Family History   Problem Relation Age of Onset    Anxiety disorder Mother     Colon cancer Father     No Known Problems Sister     No Known Problems Brother     No Known Problems Maternal Aunt     No Known Problems Maternal Uncle     No Known Problems Paternal Aunt     No Known Problems Paternal Uncle     Liver cancer Paternal Grandmother     Schizophrenia Maternal Grandmother     Bipolar disorder Daughter      Social History     Socioeconomic History    Marital status:      Spouse name: Heena     Number of children: 4    Years of education: Not on file    Highest education level: Not on file   Occupational History    Not on file   Social Needs    Financial resource strain: Not on file    Food insecurity     Worry: Not on file     Inability: Not on file    Transportation needs     Medical: Not on file     Non-medical: Not on file   Tobacco Use    Smoking status: Former Smoker     Types: Cigarettes     Quit date:      Years since quittin.9    Smokeless tobacco: Never Used   Substance and Sexual Activity    Alcohol use: Yes     Alcohol/week: 5.0 standard drinks     Types: 3 Cans of beer, 2 Shots of liquor per week    Drug use: No    Sexual activity: Yes     Partners: Female     Birth control/protection: See Surgical Hx   Lifestyle    Physical activity     Days per week: Not on file     Minutes per session: Not on file    Stress: Not on file   Relationships    Social connections     Talks on  phone: Not on file     Gets together: Not on file     Attends Sabianism service: Not on file     Active member of club or organization: Not on file     Attends meetings of clubs or organizations: Not on file     Relationship status: Not on file   Other Topics Concern    Patient feels they ought to cut down on drinking/drug use Not Asked    Patient annoyed by others criticizing their drinking/drug use Not Asked    Patient has felt bad or guilty about drinking/drug use Not Asked    Patient has had a drink/used drugs as an eye opener in the AM Not Asked   Social History Narrative     ([place], 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); 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, orthodontist     Past Surgical History:   Procedure Laterality Date    COLONOSCOPY      2017    NASAL SEPTUM SURGERY  1988    TONSILLECTOMY      1972 approx    VASECTOMY  2003       Labs:  Lab Results   Component Value Date    WBC 76.31 (HH) 12/03/2020    HGB 6.4 (L) 12/03/2020    HCT 22.9 (L) 12/03/2020     (H) 12/03/2020    PLT 93 (L) 12/03/2020     BMP  Lab Results   Component Value Date     12/03/2020    K 4.4 12/03/2020     12/03/2020    CO2 26 12/03/2020    BUN 21 (H) 12/03/2020    CREATININE 1.1 12/03/2020    CALCIUM 8.5 (L) 12/03/2020    ANIONGAP 8 12/03/2020    ESTGFRAFRICA >60 12/03/2020    EGFRNONAA >60 12/03/2020     Lab Results   Component Value Date    ALT 25 12/03/2020    AST 14 12/03/2020    ALKPHOS 41 (L) 12/03/2020    BILITOT 1.2 (H) 12/03/2020       Lab Results   Component Value Date    IRON 249 (H) 11/19/2020    TIBC 296 11/19/2020    FERRITIN 285 11/19/2020     No results found for: [id number]  No results found for: FOLATE  No results found for: TSH      Review of Systems   Psychiatric/Behavioral: Positive for dysphoric mood. The patient is nervous/anxious.        Objective:      Physical Exam  Psychiatric:         Mood and Affect: Mood is anxious and depressed.             Assessment:      1. CLL (chronic lymphocytic leukemia)    2. Adjustment  insomnia    3. Pancytopenia           Plan:     The patient location is:  [place]  The chief complaint leading to consultation is:  Chronic lymphocytic leukemia    Visit type:  Synchronous audio and video    Face to Face time with patient:15 minutes of total time spent on the encounter, which includes face to face time and non-face to face time preparing to see the patient (eg, review of tests), Obtaining and/or reviewing separately obtained history, Documenting clinical information in the electronic or other health record, Independently interpreting results (not separately reported) and communicating results to the patient/family/caregiver, or Care coordination (not separately reported).         Each patient to whom he or she provides medical services by telemedicine is:  (1) informed of the relationship between the physician and patient and the respective role of any other health care provider with respect to management of the patient; and (2) notified that he or she may decline to receive medical services by telemedicine and may withdraw from such care at any time.    Notes:  Reviewed note from Dr. Arnel Chen with patient at this point continue with prednisone decreased to 60 mg platelet count increased to 92,000 patient is relatively asymptomatic at this point he still will have repeat laboratory studies done next week and scheduled appointment at [facility] in mid December to discuss treatment options.  Patient is able to participate in research trial if he wishes to do so here in the [facility].  Reviewed information with him        Shakeel Georges Jr, MD FACP

## 2020-12-04 ENCOUNTER — PATIENT MESSAGE (OUTPATIENT)
Dept: HEMATOLOGY/ONCOLOGY | Facility: CLINIC | Age: 60
End: 2020-12-04

## 2020-12-04 DIAGNOSIS — F41.9 ANXIETY: Primary | ICD-10-CM

## 2020-12-04 LAB — PATH REV BLD -IMP: NORMAL

## 2020-12-04 RX ORDER — LORAZEPAM 1 MG/1
1 TABLET ORAL NIGHTLY
Qty: 30 TABLET | Refills: 1 | Status: SHIPPED | OUTPATIENT
Start: 2020-12-04 | End: 2021-06-03

## 2020-12-13 ENCOUNTER — PATIENT MESSAGE (OUTPATIENT)
Dept: HEMATOLOGY/ONCOLOGY | Facility: CLINIC | Age: 60
End: 2020-12-13

## 2020-12-18 ENCOUNTER — PATIENT MESSAGE (OUTPATIENT)
Dept: HEMATOLOGY/ONCOLOGY | Facility: CLINIC | Age: 60
End: 2020-12-18

## 2020-12-18 DIAGNOSIS — C91.10 CLL (CHRONIC LYMPHOCYTIC LEUKEMIA): Primary | ICD-10-CM

## 2020-12-21 ENCOUNTER — DOCUMENTATION ONLY (OUTPATIENT)
Dept: HEMATOLOGY/ONCOLOGY | Facility: CLINIC | Age: 60
End: 2020-12-21

## 2020-12-21 DIAGNOSIS — C91.10 CLL (CHRONIC LYMPHOCYTIC LEUKEMIA): Primary | ICD-10-CM

## 2020-12-21 NOTE — NURSING
Spoke with dr dexter and pt is to taper his prednisone as directed and it does not matter whether he is still taking this once his Ibrutinib arrives.  Pt is to call me once he has received his medication and then we will set up a visit with dr dexter to obtain informed consent and lab prior to pt starting medication.  Pt prefers to have his teaching regarding this medication through [facility] .  I have left a detailed message regarding this information with my direct call back number in message.  Will follow pt and receipt of medication and set up follow up with dr dexter .   Oncology Navigation   Intake  Date of Diagnosis: 06/01/18  Cancer Type: Malignant hem  Initial Nurse Navigator Contact: 12/18/20  Date Worked: 12/21/20  Multiple appointments: Yes  Reason for Treatment Delay: Other (awaiting rx from [facility])     Treatment  Current Status: Active       Medical Oncologist: isacc  Oral Therapy: Planned  Oral Therapy Name: Ibrutinib                   Barriers of Care: Other  Other Barriers: awaiting [facility] shipment of Ibrutinib      Acuity  Systemic Treatment - predicted or initiated: Oral chemotherapy only (+1)  Treatment Tolerability: Has not started treatment yet/treatment fully completed and side effects resolved  Comorbidities in Medical History: 1  Support: 0  Verbalizes the need for more education: 1  Navigation Acuity: 3     Follow Up  No follow-ups on file.

## 2020-12-21 NOTE — NURSING
Spoke with Diego at [facility] to expedite rx for Ibrutinib that was sent by dr Georges on 12/18/20.  LM for pt to call me back directly to review Ibrutinib Rx and [facility] process.  Direct number left in message.  Will arrange follow up with Leandro Tillman for consent and follow up.

## 2020-12-22 ENCOUNTER — SPECIALTY PHARMACY (OUTPATIENT)
Dept: PHARMACY | Facility: CLINIC | Age: 60
End: 2020-12-22

## 2020-12-22 DIAGNOSIS — C91.10 CLL (CHRONIC LYMPHOCYTIC LEUKEMIA): ICD-10-CM

## 2020-12-22 NOTE — TELEPHONE ENCOUNTER
Received new script for Imbruvica. Requires ANTONINA.    Forwarding to team to get override and complete BI  [facility] is ok to fill Imbruvica for MD.

## 2020-12-22 NOTE — TELEPHONE ENCOUNTER
Informed patient Imbruvica is approved by insurance with an estimated co-pay of $4148.12 and discussed FA options. Patient voiced understanding and gave permission for [facility] to obtain co-pay card which will reduce co-pay to $10.00 each fill. Patient is aware that [facility] is out of network.     Must fill Imbruvica through [id number].   Phone: [phone]  Fax: [phone]    FOR DOCUMENTATION ONLY:  Financial Assistance for IMBRUVICA is approved as of 12/22/20  Source: Imbruvica Co-pay Card  BIN: [id number]  Id: [id number]  GRP: [id number]  Annual Max: $[id number]  Co-pay with co-pay card: $10.00

## 2020-12-28 ENCOUNTER — SPECIALTY PHARMACY (OUTPATIENT)
Dept: PHARMACY | Facility: CLINIC | Age: 60
End: 2020-12-28

## 2020-12-28 NOTE — TELEPHONE ENCOUNTER
BI completed on 12/22 and insurance stated that [facility] is O[facility] and would not be able to fill Imbruvica. Messaged MD to send to [id number]. C-pay card is already sent to [facility] to ensure that he would pay no more than $10 for his prescription.

## 2020-12-30 ENCOUNTER — DOCUMENTATION ONLY (OUTPATIENT)
Dept: HEMATOLOGY/ONCOLOGY | Facility: CLINIC | Age: 60
End: 2020-12-30

## 2020-12-30 DIAGNOSIS — C91.10 CLL (CHRONIC LYMPHOCYTIC LEUKEMIA): ICD-10-CM

## 2020-12-30 NOTE — NURSING
Sent message to Dr. Georges to change pharmacies to [facility] for pt Ibrutinib rx as per [facility] communication in pt chart note. Dr georges has sent new rx today to [facility].  I have left a message for pt regarding this and left my direct number in message to have pt call me back to clarify.   Pt returned my call and stated that he has received his medication and is seeing Dr. Georges tomorrow and plans to start on 1/1/21.  We reviewed side effects and reasons to report to MD such as fever 100.4 and n/v diarrhea , constipation, rash ect.  Pt states [facility] provided over the phone chemo education . He will sign consent tomorrow and has my direct number to call with any further concerns.

## 2020-12-31 ENCOUNTER — LAB VISIT (OUTPATIENT)
Dept: LAB | Facility: HOSPITAL | Age: 60
End: 2020-12-31
Attending: INTERNAL MEDICINE
Payer: COMMERCIAL

## 2020-12-31 ENCOUNTER — OFFICE VISIT (OUTPATIENT)
Dept: HEMATOLOGY/ONCOLOGY | Facility: CLINIC | Age: 60
End: 2020-12-31
Payer: COMMERCIAL

## 2020-12-31 VITALS
OXYGEN SATURATION: 98 % | HEART RATE: 61 BPM | DIASTOLIC BLOOD PRESSURE: 73 MMHG | WEIGHT: 223.31 LBS | HEIGHT: 74 IN | SYSTOLIC BLOOD PRESSURE: 116 MMHG | BODY MASS INDEX: 28.66 KG/M2 | TEMPERATURE: 98 F

## 2020-12-31 DIAGNOSIS — C91.10 CLL (CHRONIC LYMPHOCYTIC LEUKEMIA): ICD-10-CM

## 2020-12-31 DIAGNOSIS — C91.10 CLL (CHRONIC LYMPHOCYTIC LEUKEMIA): Primary | ICD-10-CM

## 2020-12-31 LAB
ANISOCYTOSIS BLD QL SMEAR: SLIGHT
BASOPHILS NFR BLD: 1 % (ref 0–1.9)
DACRYOCYTES BLD QL SMEAR: ABNORMAL
DIFFERENTIAL METHOD: ABNORMAL
EOSINOPHIL NFR BLD: 1 % (ref 0–8)
ERYTHROCYTE [DISTWIDTH] IN BLOOD BY AUTOMATED COUNT: 18 % (ref 11.5–14.5)
HCT VFR BLD AUTO: 34.5 % (ref 40–54)
HGB BLD-MCNC: 10.6 G/DL (ref 14–18)
IMM GRANULOCYTES # BLD AUTO: ABNORMAL K/UL (ref 0–0.04)
IMM GRANULOCYTES NFR BLD AUTO: ABNORMAL % (ref 0–0.5)
LYMPHOCYTES NFR BLD: 82 % (ref 18–48)
MCH RBC QN AUTO: 31.5 PG (ref 27–31)
MCHC RBC AUTO-ENTMCNC: 30.7 G/DL (ref 32–36)
MCV RBC AUTO: 102 FL (ref 82–98)
MONOCYTES NFR BLD: 3 % (ref 4–15)
NEUTROPHILS NFR BLD: 13 % (ref 38–73)
NRBC BLD-RTO: 0 /100 WBC
PLATELET # BLD AUTO: 159 K/UL (ref 150–350)
PLATELET BLD QL SMEAR: ABNORMAL
PMV BLD AUTO: 9.7 FL (ref 9.2–12.9)
POIKILOCYTOSIS BLD QL SMEAR: SLIGHT
RBC # BLD AUTO: 3.37 M/UL (ref 4.6–6.2)
WBC # BLD AUTO: 20.79 K/UL (ref 3.9–12.7)

## 2020-12-31 PROCEDURE — 99214 PR OFFICE/OUTPT VISIT, EST, LEVL IV, 30-39 MIN: ICD-10-PCS | Mod: S$GLB,,, | Performed by: INTERNAL MEDICINE

## 2020-12-31 PROCEDURE — 99214 OFFICE O/P EST MOD 30 MIN: CPT | Mod: S$GLB,,, | Performed by: INTERNAL MEDICINE

## 2020-12-31 PROCEDURE — 1126F AMNT PAIN NOTED NONE PRSNT: CPT | Mod: S$GLB,,, | Performed by: INTERNAL MEDICINE

## 2020-12-31 PROCEDURE — 85007 BL SMEAR W/DIFF WBC COUNT: CPT

## 2020-12-31 PROCEDURE — 99999 PR PBB SHADOW E&M-EST. PATIENT-LVL III: CPT | Mod: PBBFAC,,, | Performed by: INTERNAL MEDICINE

## 2020-12-31 PROCEDURE — 1126F PR PAIN SEVERITY QUANTIFIED, NO PAIN PRESENT: ICD-10-PCS | Mod: S$GLB,,, | Performed by: INTERNAL MEDICINE

## 2020-12-31 PROCEDURE — 3008F PR BODY MASS INDEX (BMI) DOCUMENTED: ICD-10-PCS | Mod: CPTII,S$GLB,, | Performed by: INTERNAL MEDICINE

## 2020-12-31 PROCEDURE — 99999 PR PBB SHADOW E&M-EST. PATIENT-LVL III: ICD-10-PCS | Mod: PBBFAC,,, | Performed by: INTERNAL MEDICINE

## 2020-12-31 PROCEDURE — 3008F BODY MASS INDEX DOCD: CPT | Mod: CPTII,S$GLB,, | Performed by: INTERNAL MEDICINE

## 2020-12-31 PROCEDURE — 85027 COMPLETE CBC AUTOMATED: CPT

## 2020-12-31 PROCEDURE — 36415 COLL VENOUS BLD VENIPUNCTURE: CPT

## 2020-12-31 NOTE — PROGRESS NOTES
Subjective:       Patient ID: Rocky Esquivel is a 60 y.o. male.    Chief Complaint: Results and Leukemia    HPI 60-year-old male recently returns from [facility] reviewed note from Dr. Heath Mackey in Care everywhere patient has elected to proceed with ibrutinib 420 mg daily.  Discussed implications with him ECOG status 1    Past Medical History:   Diagnosis Date    Allergy     as a child    Anxiety     Therapy     marital therapy     Family History   Problem Relation Age of Onset    Anxiety disorder Mother     Colon cancer Father     No Known Problems Sister     No Known Problems Brother     No Known Problems Maternal Aunt     No Known Problems Maternal Uncle     No Known Problems Paternal Aunt     No Known Problems Paternal Uncle     Liver cancer Paternal Grandmother     Schizophrenia Maternal Grandmother     Bipolar disorder Daughter      Social History     Socioeconomic History    Marital status:      Spouse name: Heena     Number of children: 4    Years of education: Not on file    Highest education level: Not on file   Occupational History    Not on file   Social Needs    Financial resource strain: Not on file    Food insecurity     Worry: Not on file     Inability: Not on file    Transportation needs     Medical: Not on file     Non-medical: Not on file   Tobacco Use    Smoking status: Former Smoker     Types: Cigarettes     Quit date:      Years since quittin.0    Smokeless tobacco: Never Used   Substance and Sexual Activity    Alcohol use: Yes     Alcohol/week: 5.0 standard drinks     Types: 3 Cans of beer, 2 Shots of liquor per week    Drug use: No    Sexual activity: Yes     Partners: Female     Birth control/protection: See Surgical Hx   Lifestyle    Physical activity     Days per week: Not on file     Minutes per session: Not on file    Stress: Not on file   Relationships    Social connections     Talks on phone: Not on file     Gets together: Not on  file     Attends Christianity service: Not on file     Active member of club or organization: Not on file     Attends meetings of clubs or organizations: Not on file     Relationship status: Not on file   Other Topics Concern    Patient feels they ought to cut down on drinking/drug use Not Asked    Patient annoyed by others criticizing their drinking/drug use Not Asked    Patient has felt bad or guilty about drinking/drug use Not Asked    Patient has had a drink/used drugs as an eye opener in the AM Not Asked   Social History Narrative     ([employer], 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); 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, orthodontist     Past Surgical History:   Procedure Laterality Date    COLONOSCOPY      2017    NASAL SEPTUM SURGERY  1988    TONSILLECTOMY      1972 approx    VASECTOMY  2003       Labs:  Lab Results   Component Value Date    WBC 20.79 (H) 12/31/2020    HGB 10.6 (L) 12/31/2020    HCT 34.5 (L) 12/31/2020     (H) 12/31/2020     12/31/2020     BMP  Lab Results   Component Value Date     12/03/2020    K 4.4 12/03/2020     12/03/2020    CO2 26 12/03/2020    BUN 21 (H) 12/03/2020    CREATININE 1.1 12/03/2020    CALCIUM 8.5 (L) 12/03/2020    ANIONGAP 8 12/03/2020    ESTGFRAFRICA >60 12/03/2020    EGFRNONAA >60 12/03/2020     Lab Results   Component Value Date    ALT 25 12/03/2020    AST 14 12/03/2020    ALKPHOS 41 (L) 12/03/2020    BILITOT 1.2 (H) 12/03/2020       Lab Results   Component Value Date    IRON 249 (H) 11/19/2020    TIBC 296 11/19/2020    FERRITIN 285 11/19/2020     No results found for: [id number]  No results found for: FOLATE  No results found for: TSH      Review of Systems   Constitutional: Negative for activity change, appetite change, chills, diaphoresis, fatigue, fever and unexpected weight change.   HENT: Negative for congestion, dental problem, drooling, ear discharge, ear pain, facial swelling, hearing loss, mouth sores, nosebleeds, postnasal drip, rhinorrhea, sinus pressure, sneezing, sore  throat, tinnitus, trouble swallowing and voice change.    Eyes: Negative for photophobia, pain, discharge, redness, itching and visual disturbance.   Respiratory: Negative for apnea, cough, choking, chest tightness, shortness of breath, wheezing and stridor.    Cardiovascular: Negative for chest pain, palpitations and leg swelling.   Gastrointestinal: Negative for abdominal distention, abdominal pain, anal bleeding, blood in stool, constipation, diarrhea, nausea, rectal pain and vomiting.   Endocrine: Negative for cold intolerance, heat intolerance, polydipsia, polyphagia and polyuria.   Genitourinary: Negative for decreased urine volume, difficulty urinating, discharge, dysuria, enuresis, flank pain, frequency, genital sores, hematuria, penile pain, penile swelling, scrotal swelling, testicular pain and urgency.   Musculoskeletal: Negative for arthralgias, back pain, gait problem, joint swelling, myalgias, neck pain and neck stiffness.   Skin: Negative for color change, pallor, rash and wound.   Allergic/Immunologic: Negative for environmental allergies, food allergies and immunocompromised state.   Neurological: Negative for dizziness, tremors, seizures, syncope, facial asymmetry, speech difficulty, weakness, light-headedness, numbness and headaches.   Hematological: Negative for adenopathy. Does not bruise/bleed easily.   Psychiatric/Behavioral: Negative for agitation, behavioral problems, confusion, decreased concentration, dysphoric mood, hallucinations, self-injury, sleep disturbance and suicidal ideas. The patient is not nervous/anxious and is not hyperactive.        Objective:      Physical Exam  Vitals signs reviewed.   Constitutional:       General: He is not in acute distress.     Appearance: He is well-developed. He is not diaphoretic.   HENT:      Head: Normocephalic.      Right Ear: External ear normal.      Left Ear: External ear normal.      Nose: Nose normal.      Right Sinus: No maxillary sinus  tenderness or frontal sinus tenderness.      Left Sinus: No maxillary sinus tenderness or frontal sinus tenderness.      Mouth/Throat:      Pharynx: No oropharyngeal exudate.   Eyes:      General: Lids are normal. No scleral icterus.        Right eye: No discharge.         Left eye: No discharge.      Extraocular Movements:      Right eye: Normal extraocular motion.      Left eye: Normal extraocular motion.      Conjunctiva/sclera:      Right eye: Right conjunctiva is not injected. No hemorrhage.     Left eye: Left conjunctiva is not injected. No hemorrhage.     Pupils: Pupils are equal, round, and reactive to light.   Neck:      Musculoskeletal: Normal range of motion and neck supple.      Thyroid: No thyromegaly.      Vascular: No JVD.      Trachea: No tracheal deviation.   Cardiovascular:      Rate and Rhythm: Normal rate.   Pulmonary:      Effort: Pulmonary effort is normal. No respiratory distress.      Breath sounds: No stridor.   Abdominal:      General: Bowel sounds are normal.      Palpations: Abdomen is soft. There is no hepatomegaly, splenomegaly or mass.      Tenderness: There is no abdominal tenderness.   Musculoskeletal: Normal range of motion.         General: No tenderness.   Lymphadenopathy:      Head:      Right side of head: No posterior auricular or occipital adenopathy.      Left side of head: No posterior auricular or occipital adenopathy.      Cervical: No cervical adenopathy.      Right cervical: No superficial, deep or posterior cervical adenopathy.     Left cervical: No superficial, deep or posterior cervical adenopathy.      Upper Body:      Right upper body: No supraclavicular adenopathy.      Left upper body: No supraclavicular adenopathy.   Skin:     General: Skin is dry.      Findings: No erythema or rash.      Nails: There is no clubbing.     Neurological:      Mental Status: He is alert and oriented to person, place, and time.      Cranial Nerves: No cranial nerve deficit.       Coordination: Coordination normal.   Psychiatric:         Behavior: Behavior normal.         Thought Content: Thought content normal.         Judgment: Judgment normal.             Assessment:      1. CLL (chronic lymphocytic leukemia)           Plan:     Extensive conversation with patient will begin on ibrutinib 420 mg daily.  Risk benefit ratio explained the patient will have acquired medication will start dosing tomorrow is completely tapered off of his steroids baseline laboratory studies today with marked improvement in his white count hemoglobin platelet probably demonstrating some degree of peripheral destruction as well as cytotoxic affects of prednisone.  Discussed implications with him CBC CMP in 2 weeks.  And then monthly thereafter and communicate through portal and return in 3 months for review 25 min face-to-face time coordination of care greater 50% time face-to-face        Shakeel Georges Jr, MD FACP

## 2021-01-14 ENCOUNTER — LAB VISIT (OUTPATIENT)
Dept: LAB | Facility: HOSPITAL | Age: 61
End: 2021-01-14
Attending: INTERNAL MEDICINE
Payer: COMMERCIAL

## 2021-01-14 ENCOUNTER — PATIENT MESSAGE (OUTPATIENT)
Dept: HEMATOLOGY/ONCOLOGY | Facility: CLINIC | Age: 61
End: 2021-01-14

## 2021-01-14 DIAGNOSIS — C91.10 CLL (CHRONIC LYMPHOCYTIC LEUKEMIA): ICD-10-CM

## 2021-01-14 LAB
ALBUMIN SERPL BCP-MCNC: 4.3 G/DL (ref 3.5–5.2)
ALP SERPL-CCNC: 37 U/L (ref 55–135)
ALT SERPL W/O P-5'-P-CCNC: 30 U/L (ref 10–44)
ANION GAP SERPL CALC-SCNC: 8 MMOL/L (ref 8–16)
ANISOCYTOSIS BLD QL SMEAR: SLIGHT
AST SERPL-CCNC: 22 U/L (ref 10–40)
BASOPHILS # BLD AUTO: ABNORMAL K/UL (ref 0–0.2)
BASOPHILS NFR BLD: 0 % (ref 0–1.9)
BILIRUB SERPL-MCNC: 0.8 MG/DL (ref 0.1–1)
BUN SERPL-MCNC: 17 MG/DL (ref 6–20)
CALCIUM SERPL-MCNC: 8.7 MG/DL (ref 8.7–10.5)
CHLORIDE SERPL-SCNC: 105 MMOL/L (ref 95–110)
CO2 SERPL-SCNC: 27 MMOL/L (ref 23–29)
CREAT SERPL-MCNC: 1.1 MG/DL (ref 0.5–1.4)
DIFFERENTIAL METHOD: ABNORMAL
EOSINOPHIL # BLD AUTO: ABNORMAL K/UL (ref 0–0.5)
EOSINOPHIL NFR BLD: 1 % (ref 0–8)
ERYTHROCYTE [DISTWIDTH] IN BLOOD BY AUTOMATED COUNT: 16.5 % (ref 11.5–14.5)
EST. GFR  (AFRICAN AMERICAN): >60 ML/MIN/1.73 M^2
EST. GFR  (NON AFRICAN AMERICAN): >60 ML/MIN/1.73 M^2
GLUCOSE SERPL-MCNC: 92 MG/DL (ref 70–110)
HCT VFR BLD AUTO: 35.4 % (ref 40–54)
HGB BLD-MCNC: 10.9 G/DL (ref 14–18)
IMM GRANULOCYTES # BLD AUTO: ABNORMAL K/UL (ref 0–0.04)
IMM GRANULOCYTES NFR BLD AUTO: ABNORMAL % (ref 0–0.5)
LDH SERPL L TO P-CCNC: 123 U/L (ref 110–260)
LYMPHOCYTES # BLD AUTO: ABNORMAL K/UL (ref 1–4.8)
LYMPHOCYTES NFR BLD: 91 % (ref 18–48)
MCH RBC QN AUTO: 30.6 PG (ref 27–31)
MCHC RBC AUTO-ENTMCNC: 30.8 G/DL (ref 32–36)
MCV RBC AUTO: 99 FL (ref 82–98)
MONOCYTES # BLD AUTO: ABNORMAL K/UL (ref 0.3–1)
MONOCYTES NFR BLD: 0 % (ref 4–15)
NEUTROPHILS NFR BLD: 8 % (ref 38–73)
NRBC BLD-RTO: 0 /100 WBC
OVALOCYTES BLD QL SMEAR: ABNORMAL
PLATELET # BLD AUTO: 220 K/UL (ref 150–350)
PLATELET BLD QL SMEAR: ABNORMAL
PMV BLD AUTO: 10.7 FL (ref 9.2–12.9)
POIKILOCYTOSIS BLD QL SMEAR: SLIGHT
POTASSIUM SERPL-SCNC: 4.2 MMOL/L (ref 3.5–5.1)
PROT SERPL-MCNC: 6.2 G/DL (ref 6–8.4)
RBC # BLD AUTO: 3.56 M/UL (ref 4.6–6.2)
SMUDGE CELLS BLD QL SMEAR: PRESENT
SODIUM SERPL-SCNC: 140 MMOL/L (ref 136–145)
WBC # BLD AUTO: 33.48 K/UL (ref 3.9–12.7)

## 2021-01-14 PROCEDURE — 83615 LACTATE (LD) (LDH) ENZYME: CPT

## 2021-01-14 PROCEDURE — 85027 COMPLETE CBC AUTOMATED: CPT

## 2021-01-14 PROCEDURE — 80053 COMPREHEN METABOLIC PANEL: CPT

## 2021-01-14 PROCEDURE — 85007 BL SMEAR W/DIFF WBC COUNT: CPT

## 2021-01-14 PROCEDURE — 36415 COLL VENOUS BLD VENIPUNCTURE: CPT

## 2021-02-25 ENCOUNTER — OFFICE VISIT (OUTPATIENT)
Dept: HEMATOLOGY/ONCOLOGY | Facility: CLINIC | Age: 61
End: 2021-02-25
Payer: COMMERCIAL

## 2021-02-25 ENCOUNTER — LAB VISIT (OUTPATIENT)
Dept: LAB | Facility: HOSPITAL | Age: 61
End: 2021-02-25
Attending: INTERNAL MEDICINE
Payer: COMMERCIAL

## 2021-02-25 VITALS
SYSTOLIC BLOOD PRESSURE: 124 MMHG | WEIGHT: 230.63 LBS | HEART RATE: 64 BPM | TEMPERATURE: 98 F | BODY MASS INDEX: 28.68 KG/M2 | OXYGEN SATURATION: 95 % | HEIGHT: 75 IN | DIASTOLIC BLOOD PRESSURE: 74 MMHG

## 2021-02-25 DIAGNOSIS — F51.02 ADJUSTMENT INSOMNIA: ICD-10-CM

## 2021-02-25 DIAGNOSIS — C91.10 CLL (CHRONIC LYMPHOCYTIC LEUKEMIA): ICD-10-CM

## 2021-02-25 DIAGNOSIS — C91.10 CLL (CHRONIC LYMPHOCYTIC LEUKEMIA): Primary | ICD-10-CM

## 2021-02-25 PROBLEM — D64.9 NORMOCYTIC ANEMIA: Status: RESOLVED | Noted: 2019-12-05 | Resolved: 2021-02-25

## 2021-02-25 PROBLEM — D61.818 PANCYTOPENIA: Status: RESOLVED | Noted: 2020-12-03 | Resolved: 2021-02-25

## 2021-02-25 LAB
ALBUMIN SERPL BCP-MCNC: 4.3 G/DL (ref 3.5–5.2)
ALP SERPL-CCNC: 41 U/L (ref 55–135)
ALT SERPL W/O P-5'-P-CCNC: 44 U/L (ref 10–44)
ANION GAP SERPL CALC-SCNC: 9 MMOL/L (ref 8–16)
AST SERPL-CCNC: 25 U/L (ref 10–40)
BASOPHILS # BLD AUTO: ABNORMAL K/UL (ref 0–0.2)
BASOPHILS NFR BLD: 0 % (ref 0–1.9)
BILIRUB SERPL-MCNC: 0.9 MG/DL (ref 0.1–1)
BUN SERPL-MCNC: 13 MG/DL (ref 6–20)
CALCIUM SERPL-MCNC: 8.9 MG/DL (ref 8.7–10.5)
CHLORIDE SERPL-SCNC: 102 MMOL/L (ref 95–110)
CO2 SERPL-SCNC: 28 MMOL/L (ref 23–29)
CREAT SERPL-MCNC: 1.2 MG/DL (ref 0.5–1.4)
DIFFERENTIAL METHOD: ABNORMAL
EOSINOPHIL # BLD AUTO: ABNORMAL K/UL (ref 0–0.5)
EOSINOPHIL NFR BLD: 1 % (ref 0–8)
ERYTHROCYTE [DISTWIDTH] IN BLOOD BY AUTOMATED COUNT: 14.9 % (ref 11.5–14.5)
EST. GFR  (AFRICAN AMERICAN): >60 ML/MIN/1.73 M^2
EST. GFR  (NON AFRICAN AMERICAN): >60 ML/MIN/1.73 M^2
GLUCOSE SERPL-MCNC: 122 MG/DL (ref 70–110)
HCT VFR BLD AUTO: 41.8 % (ref 40–54)
HGB BLD-MCNC: 12.9 G/DL (ref 14–18)
IMM GRANULOCYTES # BLD AUTO: ABNORMAL K/UL (ref 0–0.04)
IMM GRANULOCYTES NFR BLD AUTO: ABNORMAL % (ref 0–0.5)
LDH SERPL L TO P-CCNC: 133 U/L (ref 110–260)
LYMPHOCYTES # BLD AUTO: ABNORMAL K/UL (ref 1–4.8)
LYMPHOCYTES NFR BLD: 85 % (ref 18–48)
MCH RBC QN AUTO: 28 PG (ref 27–31)
MCHC RBC AUTO-ENTMCNC: 30.9 G/DL (ref 32–36)
MCV RBC AUTO: 91 FL (ref 82–98)
MONOCYTES # BLD AUTO: ABNORMAL K/UL (ref 0.3–1)
MONOCYTES NFR BLD: 3 % (ref 4–15)
NEUTROPHILS NFR BLD: 11 % (ref 38–73)
NRBC BLD-RTO: 0 /100 WBC
PLATELET # BLD AUTO: 179 K/UL (ref 150–350)
PLATELET BLD QL SMEAR: ABNORMAL
PMV BLD AUTO: 12 FL (ref 9.2–12.9)
POTASSIUM SERPL-SCNC: 4.1 MMOL/L (ref 3.5–5.1)
PROT SERPL-MCNC: 6.1 G/DL (ref 6–8.4)
RBC # BLD AUTO: 4.6 M/UL (ref 4.6–6.2)
SODIUM SERPL-SCNC: 139 MMOL/L (ref 136–145)
WBC # BLD AUTO: 25.38 K/UL (ref 3.9–12.7)

## 2021-02-25 PROCEDURE — 80053 COMPREHEN METABOLIC PANEL: CPT

## 2021-02-25 PROCEDURE — 1126F PR PAIN SEVERITY QUANTIFIED, NO PAIN PRESENT: ICD-10-PCS | Mod: S$GLB,,, | Performed by: INTERNAL MEDICINE

## 2021-02-25 PROCEDURE — 1126F AMNT PAIN NOTED NONE PRSNT: CPT | Mod: S$GLB,,, | Performed by: INTERNAL MEDICINE

## 2021-02-25 PROCEDURE — 99999 PR PBB SHADOW E&M-EST. PATIENT-LVL III: ICD-10-PCS | Mod: PBBFAC,,, | Performed by: INTERNAL MEDICINE

## 2021-02-25 PROCEDURE — 3008F BODY MASS INDEX DOCD: CPT | Mod: CPTII,S$GLB,, | Performed by: INTERNAL MEDICINE

## 2021-02-25 PROCEDURE — 85027 COMPLETE CBC AUTOMATED: CPT

## 2021-02-25 PROCEDURE — 83615 LACTATE (LD) (LDH) ENZYME: CPT

## 2021-02-25 PROCEDURE — 36415 COLL VENOUS BLD VENIPUNCTURE: CPT

## 2021-02-25 PROCEDURE — 99214 OFFICE O/P EST MOD 30 MIN: CPT | Mod: S$GLB,,, | Performed by: INTERNAL MEDICINE

## 2021-02-25 PROCEDURE — 85007 BL SMEAR W/DIFF WBC COUNT: CPT

## 2021-02-25 PROCEDURE — 99214 PR OFFICE/OUTPT VISIT, EST, LEVL IV, 30-39 MIN: ICD-10-PCS | Mod: S$GLB,,, | Performed by: INTERNAL MEDICINE

## 2021-02-25 PROCEDURE — 99999 PR PBB SHADOW E&M-EST. PATIENT-LVL III: CPT | Mod: PBBFAC,,, | Performed by: INTERNAL MEDICINE

## 2021-02-25 PROCEDURE — 3008F PR BODY MASS INDEX (BMI) DOCUMENTED: ICD-10-PCS | Mod: CPTII,S$GLB,, | Performed by: INTERNAL MEDICINE

## 2021-03-25 ENCOUNTER — LAB VISIT (OUTPATIENT)
Dept: LAB | Facility: HOSPITAL | Age: 61
End: 2021-03-25
Attending: INTERNAL MEDICINE
Payer: COMMERCIAL

## 2021-03-25 DIAGNOSIS — C91.10 CLL (CHRONIC LYMPHOCYTIC LEUKEMIA): ICD-10-CM

## 2021-03-25 LAB
ALBUMIN SERPL BCP-MCNC: 4.3 G/DL (ref 3.5–5.2)
ALP SERPL-CCNC: 45 U/L (ref 55–135)
ALT SERPL W/O P-5'-P-CCNC: 32 U/L (ref 10–44)
ANION GAP SERPL CALC-SCNC: 6 MMOL/L (ref 8–16)
ANISOCYTOSIS BLD QL SMEAR: SLIGHT
AST SERPL-CCNC: 22 U/L (ref 10–40)
BASOPHILS NFR BLD: 2 % (ref 0–1.9)
BILIRUB SERPL-MCNC: 1 MG/DL (ref 0.1–1)
BUN SERPL-MCNC: 13 MG/DL (ref 6–20)
CALCIUM SERPL-MCNC: 8.8 MG/DL (ref 8.7–10.5)
CHLORIDE SERPL-SCNC: 103 MMOL/L (ref 95–110)
CO2 SERPL-SCNC: 31 MMOL/L (ref 23–29)
CREAT SERPL-MCNC: 1.1 MG/DL (ref 0.5–1.4)
DACRYOCYTES BLD QL SMEAR: ABNORMAL
DIFFERENTIAL METHOD: ABNORMAL
EOSINOPHIL NFR BLD: 0 % (ref 0–8)
ERYTHROCYTE [DISTWIDTH] IN BLOOD BY AUTOMATED COUNT: 14.1 % (ref 11.5–14.5)
EST. GFR  (AFRICAN AMERICAN): >60 ML/MIN/1.73 M^2
EST. GFR  (NON AFRICAN AMERICAN): >60 ML/MIN/1.73 M^2
GLUCOSE SERPL-MCNC: 81 MG/DL (ref 70–110)
HCT VFR BLD AUTO: 43.8 % (ref 40–54)
HGB BLD-MCNC: 13.9 G/DL (ref 14–18)
IMM GRANULOCYTES # BLD AUTO: ABNORMAL K/UL (ref 0–0.04)
IMM GRANULOCYTES NFR BLD AUTO: ABNORMAL % (ref 0–0.5)
LDH SERPL L TO P-CCNC: 137 U/L (ref 110–260)
LYMPHOCYTES NFR BLD: 80 % (ref 18–48)
MCH RBC QN AUTO: 27.4 PG (ref 27–31)
MCHC RBC AUTO-ENTMCNC: 31.7 G/DL (ref 32–36)
MCV RBC AUTO: 86 FL (ref 82–98)
MONOCYTES NFR BLD: 2 % (ref 4–15)
NEUTROPHILS NFR BLD: 16 % (ref 38–73)
NRBC BLD-RTO: 0 /100 WBC
OVALOCYTES BLD QL SMEAR: ABNORMAL
PATH REV BLD -IMP: NORMAL
PLATELET # BLD AUTO: 163 K/UL (ref 150–350)
PLATELET BLD QL SMEAR: ABNORMAL
PMV BLD AUTO: 11.8 FL (ref 9.2–12.9)
POIKILOCYTOSIS BLD QL SMEAR: SLIGHT
POTASSIUM SERPL-SCNC: 4.3 MMOL/L (ref 3.5–5.1)
PROT SERPL-MCNC: 6.1 G/DL (ref 6–8.4)
RBC # BLD AUTO: 5.08 M/UL (ref 4.6–6.2)
SODIUM SERPL-SCNC: 140 MMOL/L (ref 136–145)
WBC # BLD AUTO: 14.42 K/UL (ref 3.9–12.7)

## 2021-03-25 PROCEDURE — 85060 BLOOD SMEAR INTERPRETATION: CPT | Mod: ,,, | Performed by: PATHOLOGY

## 2021-03-25 PROCEDURE — 80053 COMPREHEN METABOLIC PANEL: CPT | Performed by: INTERNAL MEDICINE

## 2021-03-25 PROCEDURE — 85060 PATHOLOGIST REVIEW: ICD-10-PCS | Mod: ,,, | Performed by: PATHOLOGY

## 2021-03-25 PROCEDURE — 36415 COLL VENOUS BLD VENIPUNCTURE: CPT | Performed by: INTERNAL MEDICINE

## 2021-03-25 PROCEDURE — 83615 LACTATE (LD) (LDH) ENZYME: CPT | Performed by: INTERNAL MEDICINE

## 2021-03-25 PROCEDURE — 85027 COMPLETE CBC AUTOMATED: CPT | Performed by: INTERNAL MEDICINE

## 2021-03-25 PROCEDURE — 85007 BL SMEAR W/DIFF WBC COUNT: CPT | Performed by: INTERNAL MEDICINE

## 2021-04-20 ENCOUNTER — PATIENT MESSAGE (OUTPATIENT)
Dept: HEMATOLOGY/ONCOLOGY | Facility: CLINIC | Age: 61
End: 2021-04-20

## 2021-04-20 DIAGNOSIS — C91.10 CLL (CHRONIC LYMPHOCYTIC LEUKEMIA): Primary | ICD-10-CM

## 2021-04-20 DIAGNOSIS — G89.29 CHRONIC RIGHT SHOULDER PAIN: Primary | ICD-10-CM

## 2021-04-20 DIAGNOSIS — M25.511 CHRONIC RIGHT SHOULDER PAIN: Primary | ICD-10-CM

## 2021-04-22 ENCOUNTER — HOSPITAL ENCOUNTER (OUTPATIENT)
Dept: RADIOLOGY | Facility: HOSPITAL | Age: 61
Discharge: HOME | End: 2021-04-22
Attending: INTERNAL MEDICINE
Payer: COMMERCIAL

## 2021-04-22 DIAGNOSIS — C91.10 CLL (CHRONIC LYMPHOCYTIC LEUKEMIA): ICD-10-CM

## 2021-04-22 PROCEDURE — 73090 X-RAY EXAM OF FOREARM: CPT | Mod: TC,RT

## 2021-04-22 PROCEDURE — 73090 XR FOREARM RIGHT: ICD-10-PCS | Mod: 26,RT,, | Performed by: RADIOLOGY

## 2021-04-22 PROCEDURE — 73090 X-RAY EXAM OF FOREARM: CPT | Mod: 26,RT,, | Performed by: RADIOLOGY

## 2021-04-28 ENCOUNTER — PATIENT MESSAGE (OUTPATIENT)
Dept: RESEARCH | Facility: HOSPITAL | Age: 61
End: 2021-04-28

## 2021-05-11 ENCOUNTER — PATIENT MESSAGE (OUTPATIENT)
Dept: HEMATOLOGY/ONCOLOGY | Facility: CLINIC | Age: 61
End: 2021-05-11

## 2021-05-11 DIAGNOSIS — Z01.818 PRE-OP TESTING: ICD-10-CM

## 2021-05-17 ENCOUNTER — CLINICAL SUPPORT (OUTPATIENT)
Dept: URGENT CARE | Facility: CLINIC | Age: 61
End: 2021-05-17
Payer: COMMERCIAL

## 2021-05-17 DIAGNOSIS — Z20.822 ENCOUNTER FOR LABORATORY TESTING FOR COVID-19 VIRUS: Primary | ICD-10-CM

## 2021-05-17 PROCEDURE — 99211 PR OFFICE/OUTPT VISIT, EST, LEVL I: ICD-10-PCS | Mod: S$GLB,CS,, | Performed by: EMERGENCY MEDICINE

## 2021-05-17 PROCEDURE — U0005 INFEC AGEN DETEC AMPLI PROBE: HCPCS | Performed by: EMERGENCY MEDICINE

## 2021-05-17 PROCEDURE — U0003 INFECTIOUS AGENT DETECTION BY NUCLEIC ACID (DNA OR RNA); SEVERE ACUTE RESPIRATORY SYNDROME CORONAVIRUS 2 (SARS-COV-2) (CORONAVIRUS DISEASE [COVID-19]), AMPLIFIED PROBE TECHNIQUE, MAKING USE OF HIGH THROUGHPUT TECHNOLOGIES AS DESCRIBED BY CMS-2020-01-R: HCPCS | Performed by: EMERGENCY MEDICINE

## 2021-05-17 PROCEDURE — 99211 OFF/OP EST MAY X REQ PHY/QHP: CPT | Mod: S$GLB,CS,, | Performed by: EMERGENCY MEDICINE

## 2021-05-18 LAB — SARS-COV-2 RNA RESP QL NAA+PROBE: NOT DETECTED

## 2021-08-05 ENCOUNTER — LAB VISIT (OUTPATIENT)
Dept: INTERNAL MEDICINE | Facility: CLINIC | Age: 61
End: 2021-08-05
Payer: COMMERCIAL

## 2021-08-05 ENCOUNTER — LAB VISIT (OUTPATIENT)
Dept: LAB | Facility: HOSPITAL | Age: 61
End: 2021-08-05
Attending: INTERNAL MEDICINE
Payer: COMMERCIAL

## 2021-08-05 DIAGNOSIS — R05.9 COUGH: ICD-10-CM

## 2021-08-05 DIAGNOSIS — C91.10 CLL (CHRONIC LYMPHOCYTIC LEUKEMIA): ICD-10-CM

## 2021-08-05 LAB
BASOPHILS # BLD AUTO: 0.12 K/UL (ref 0–0.2)
BASOPHILS NFR BLD: 2.1 % (ref 0–1.9)
DIFFERENTIAL METHOD: ABNORMAL
EOSINOPHIL # BLD AUTO: 0.2 K/UL (ref 0–0.5)
EOSINOPHIL NFR BLD: 3.1 % (ref 0–8)
ERYTHROCYTE [DISTWIDTH] IN BLOOD BY AUTOMATED COUNT: 14 % (ref 11.5–14.5)
HCT VFR BLD AUTO: 46.2 % (ref 40–54)
HGB BLD-MCNC: 15 G/DL (ref 14–18)
IMM GRANULOCYTES # BLD AUTO: 0.01 K/UL (ref 0–0.04)
IMM GRANULOCYTES NFR BLD AUTO: 0.2 % (ref 0–0.5)
LYMPHOCYTES # BLD AUTO: 3 K/UL (ref 1–4.8)
LYMPHOCYTES NFR BLD: 51.3 % (ref 18–48)
MCH RBC QN AUTO: 28 PG (ref 27–31)
MCHC RBC AUTO-ENTMCNC: 32.5 G/DL (ref 32–36)
MCV RBC AUTO: 86 FL (ref 82–98)
MONOCYTES # BLD AUTO: 0.9 K/UL (ref 0.3–1)
MONOCYTES NFR BLD: 16.1 % (ref 4–15)
NEUTROPHILS # BLD AUTO: 1.6 K/UL (ref 1.8–7.7)
NEUTROPHILS NFR BLD: 27.2 % (ref 38–73)
NRBC BLD-RTO: 0 /100 WBC
PLATELET # BLD AUTO: 201 K/UL (ref 150–450)
PMV BLD AUTO: 10.9 FL (ref 9.2–12.9)
RBC # BLD AUTO: 5.36 M/UL (ref 4.6–6.2)
WBC # BLD AUTO: 5.85 K/UL (ref 3.9–12.7)

## 2021-08-05 PROCEDURE — 36415 COLL VENOUS BLD VENIPUNCTURE: CPT | Performed by: INTERNAL MEDICINE

## 2021-08-05 PROCEDURE — 85025 COMPLETE CBC W/AUTO DIFF WBC: CPT | Performed by: INTERNAL MEDICINE

## 2021-08-05 PROCEDURE — U0005 INFEC AGEN DETEC AMPLI PROBE: HCPCS | Performed by: INTERNAL MEDICINE

## 2021-08-05 PROCEDURE — U0003 INFECTIOUS AGENT DETECTION BY NUCLEIC ACID (DNA OR RNA); SEVERE ACUTE RESPIRATORY SYNDROME CORONAVIRUS 2 (SARS-COV-2) (CORONAVIRUS DISEASE [COVID-19]), AMPLIFIED PROBE TECHNIQUE, MAKING USE OF HIGH THROUGHPUT TECHNOLOGIES AS DESCRIBED BY CMS-2020-01-R: HCPCS | Performed by: INTERNAL MEDICINE

## 2021-08-06 LAB
SARS-COV-2 RNA RESP QL NAA+PROBE: NOT DETECTED
SARS-COV-2- CYCLE NUMBER: -1

## 2021-08-15 ENCOUNTER — PATIENT MESSAGE (OUTPATIENT)
Dept: HEMATOLOGY/ONCOLOGY | Facility: CLINIC | Age: 61
End: 2021-08-15

## 2021-08-19 ENCOUNTER — IMMUNIZATION (OUTPATIENT)
Dept: PRIMARY CARE CLINIC | Facility: CLINIC | Age: 61
End: 2021-08-19
Payer: COMMERCIAL

## 2021-08-19 DIAGNOSIS — Z23 NEED FOR VACCINATION: Primary | ICD-10-CM

## 2021-08-19 PROCEDURE — 0003A COVID-19, MRNA, LNP-S, PF, 30 MCG/0.3 ML DOSE VACCINE: ICD-10-PCS | Mod: CV19,S$GLB,, | Performed by: FAMILY MEDICINE

## 2021-08-19 PROCEDURE — 0003A COVID-19, MRNA, LNP-S, PF, 30 MCG/0.3 ML DOSE VACCINE: CPT | Mod: CV19,S$GLB,, | Performed by: FAMILY MEDICINE

## 2021-08-19 PROCEDURE — 91300 COVID-19, MRNA, LNP-S, PF, 30 MCG/0.3 ML DOSE VACCINE: ICD-10-PCS | Mod: S$GLB,,, | Performed by: FAMILY MEDICINE

## 2021-08-19 PROCEDURE — 91300 COVID-19, MRNA, LNP-S, PF, 30 MCG/0.3 ML DOSE VACCINE: CPT | Mod: S$GLB,,, | Performed by: FAMILY MEDICINE

## 2021-09-28 ENCOUNTER — LAB VISIT (OUTPATIENT)
Dept: LAB | Facility: HOSPITAL | Age: 61
End: 2021-09-28
Attending: INTERNAL MEDICINE
Payer: COMMERCIAL

## 2021-09-28 DIAGNOSIS — C91.10 CLL (CHRONIC LYMPHOCYTIC LEUKEMIA): ICD-10-CM

## 2021-09-28 LAB
ALBUMIN SERPL BCP-MCNC: 4.1 G/DL (ref 3.5–5.2)
ALP SERPL-CCNC: 45 U/L (ref 55–135)
ALT SERPL W/O P-5'-P-CCNC: 27 U/L (ref 10–44)
ANION GAP SERPL CALC-SCNC: 8 MMOL/L (ref 8–16)
AST SERPL-CCNC: 20 U/L (ref 10–40)
BASOPHILS # BLD AUTO: 0.08 K/UL (ref 0–0.2)
BASOPHILS NFR BLD: 1.5 % (ref 0–1.9)
BILIRUB SERPL-MCNC: 1.4 MG/DL (ref 0.1–1)
BUN SERPL-MCNC: 13 MG/DL (ref 6–20)
CALCIUM SERPL-MCNC: 8.3 MG/DL (ref 8.7–10.5)
CHLORIDE SERPL-SCNC: 104 MMOL/L (ref 95–110)
CO2 SERPL-SCNC: 28 MMOL/L (ref 23–29)
CREAT SERPL-MCNC: 1.2 MG/DL (ref 0.5–1.4)
DIFFERENTIAL METHOD: NORMAL
EOSINOPHIL # BLD AUTO: 0.1 K/UL (ref 0–0.5)
EOSINOPHIL NFR BLD: 1.9 % (ref 0–8)
ERYTHROCYTE [DISTWIDTH] IN BLOOD BY AUTOMATED COUNT: 13.8 % (ref 11.5–14.5)
EST. GFR  (AFRICAN AMERICAN): >60 ML/MIN/1.73 M^2
EST. GFR  (NON AFRICAN AMERICAN): >60 ML/MIN/1.73 M^2
GLUCOSE SERPL-MCNC: 88 MG/DL (ref 70–110)
HCT VFR BLD AUTO: 47 % (ref 40–54)
HGB BLD-MCNC: 15.4 G/DL (ref 14–18)
IMM GRANULOCYTES # BLD AUTO: 0.01 K/UL (ref 0–0.04)
IMM GRANULOCYTES NFR BLD AUTO: 0.2 % (ref 0–0.5)
LDH SERPL L TO P-CCNC: 128 U/L (ref 110–260)
LYMPHOCYTES # BLD AUTO: 2.3 K/UL (ref 1–4.8)
LYMPHOCYTES NFR BLD: 42.4 % (ref 18–48)
MCH RBC QN AUTO: 28.7 PG (ref 27–31)
MCHC RBC AUTO-ENTMCNC: 32.8 G/DL (ref 32–36)
MCV RBC AUTO: 88 FL (ref 82–98)
MONOCYTES # BLD AUTO: 0.7 K/UL (ref 0.3–1)
MONOCYTES NFR BLD: 12.6 % (ref 4–15)
NEUTROPHILS # BLD AUTO: 2.2 K/UL (ref 1.8–7.7)
NEUTROPHILS NFR BLD: 41.4 % (ref 38–73)
NRBC BLD-RTO: 0 /100 WBC
PLATELET # BLD AUTO: 187 K/UL (ref 150–450)
PMV BLD AUTO: 11.4 FL (ref 9.2–12.9)
POTASSIUM SERPL-SCNC: 4.5 MMOL/L (ref 3.5–5.1)
PROT SERPL-MCNC: 6.2 G/DL (ref 6–8.4)
RBC # BLD AUTO: 5.36 M/UL (ref 4.6–6.2)
SODIUM SERPL-SCNC: 140 MMOL/L (ref 136–145)
WBC # BLD AUTO: 5.33 K/UL (ref 3.9–12.7)

## 2021-09-28 PROCEDURE — 80053 COMPREHEN METABOLIC PANEL: CPT | Performed by: INTERNAL MEDICINE

## 2021-09-28 PROCEDURE — 83615 LACTATE (LD) (LDH) ENZYME: CPT | Performed by: INTERNAL MEDICINE

## 2021-09-28 PROCEDURE — 85025 COMPLETE CBC W/AUTO DIFF WBC: CPT | Performed by: INTERNAL MEDICINE

## 2021-09-29 ENCOUNTER — PATIENT MESSAGE (OUTPATIENT)
Dept: HEMATOLOGY/ONCOLOGY | Facility: CLINIC | Age: 61
End: 2021-09-29

## 2021-09-29 DIAGNOSIS — C91.10 CLL (CHRONIC LYMPHOCYTIC LEUKEMIA): Primary | ICD-10-CM

## 2021-09-30 ENCOUNTER — HOSPITAL ENCOUNTER (OUTPATIENT)
Dept: RADIOLOGY | Facility: HOSPITAL | Age: 61
Discharge: HOME | End: 2021-09-30
Attending: INTERNAL MEDICINE
Payer: COMMERCIAL

## 2021-09-30 DIAGNOSIS — C91.10 CLL (CHRONIC LYMPHOCYTIC LEUKEMIA): ICD-10-CM

## 2021-09-30 PROCEDURE — 71046 XR CHEST PA AND LATERAL: ICD-10-PCS | Mod: 26,,, | Performed by: RADIOLOGY

## 2021-09-30 PROCEDURE — 71046 X-RAY EXAM CHEST 2 VIEWS: CPT | Mod: 26,,, | Performed by: RADIOLOGY

## 2021-09-30 PROCEDURE — 71046 X-RAY EXAM CHEST 2 VIEWS: CPT | Mod: TC

## 2021-10-01 ENCOUNTER — PATIENT MESSAGE (OUTPATIENT)
Dept: HEMATOLOGY/ONCOLOGY | Facility: CLINIC | Age: 61
End: 2021-10-01

## 2021-10-04 ENCOUNTER — HOSPITAL ENCOUNTER (OUTPATIENT)
Dept: CARDIOLOGY | Facility: HOSPITAL | Age: 61
Discharge: HOME | End: 2021-10-04
Attending: INTERNAL MEDICINE
Payer: COMMERCIAL

## 2021-10-04 DIAGNOSIS — C91.10 CLL (CHRONIC LYMPHOCYTIC LEUKEMIA): ICD-10-CM

## 2021-10-04 DIAGNOSIS — C91.10 CLL (CHRONIC LYMPHOCYTIC LEUKEMIA): Primary | ICD-10-CM

## 2021-10-04 PROCEDURE — 93010 ELECTROCARDIOGRAM REPORT: CPT | Mod: ,,, | Performed by: INTERNAL MEDICINE

## 2021-10-04 PROCEDURE — 93010 EKG 12-LEAD: ICD-10-PCS | Mod: ,,, | Performed by: INTERNAL MEDICINE

## 2021-10-04 PROCEDURE — 93005 ELECTROCARDIOGRAM TRACING: CPT

## 2021-10-08 ENCOUNTER — OFFICE VISIT (OUTPATIENT)
Dept: CARDIOLOGY | Facility: CLINIC | Age: 61
End: 2021-10-08
Payer: COMMERCIAL

## 2021-10-08 ENCOUNTER — LAB VISIT (OUTPATIENT)
Dept: LAB | Facility: HOSPITAL | Age: 61
End: 2021-10-08
Attending: INTERNAL MEDICINE
Payer: COMMERCIAL

## 2021-10-08 VITALS
HEIGHT: 75 IN | OXYGEN SATURATION: 97 % | SYSTOLIC BLOOD PRESSURE: 128 MMHG | WEIGHT: 235.88 LBS | BODY MASS INDEX: 29.33 KG/M2 | DIASTOLIC BLOOD PRESSURE: 90 MMHG | RESPIRATION RATE: 16 BRPM | HEART RATE: 55 BPM

## 2021-10-08 DIAGNOSIS — C91.10 CLL (CHRONIC LYMPHOCYTIC LEUKEMIA): ICD-10-CM

## 2021-10-08 DIAGNOSIS — R00.1 BRADYCARDIA: ICD-10-CM

## 2021-10-08 DIAGNOSIS — F41.9 ANXIETY: ICD-10-CM

## 2021-10-08 DIAGNOSIS — Z01.810 PREOP CARDIOVASCULAR EXAM: ICD-10-CM

## 2021-10-08 DIAGNOSIS — F51.02 ADJUSTMENT INSOMNIA: Primary | ICD-10-CM

## 2021-10-08 LAB
CHOLEST SERPL-MCNC: 152 MG/DL (ref 120–199)
CHOLEST/HDLC SERPL: 3.1 {RATIO} (ref 2–5)
HDLC SERPL-MCNC: 49 MG/DL (ref 40–75)
HDLC SERPL: 32.2 % (ref 20–50)
LDLC SERPL CALC-MCNC: 92.4 MG/DL (ref 63–159)
NONHDLC SERPL-MCNC: 103 MG/DL
TRIGL SERPL-MCNC: 53 MG/DL (ref 30–150)

## 2021-10-08 PROCEDURE — 99999 PR PBB SHADOW E&M-EST. PATIENT-LVL IV: CPT | Mod: PBBFAC,,, | Performed by: INTERNAL MEDICINE

## 2021-10-08 PROCEDURE — 80061 LIPID PANEL: CPT | Performed by: INTERNAL MEDICINE

## 2021-10-08 PROCEDURE — 3008F BODY MASS INDEX DOCD: CPT | Mod: CPTII,S$GLB,, | Performed by: INTERNAL MEDICINE

## 2021-10-08 PROCEDURE — 1159F PR MEDICATION LIST DOCUMENTED IN MEDICAL RECORD: ICD-10-PCS | Mod: CPTII,S$GLB,, | Performed by: INTERNAL MEDICINE

## 2021-10-08 PROCEDURE — 1160F PR REVIEW ALL MEDS BY PRESCRIBER/CLIN PHARMACIST DOCUMENTED: ICD-10-PCS | Mod: CPTII,S$GLB,, | Performed by: INTERNAL MEDICINE

## 2021-10-08 PROCEDURE — 99204 PR OFFICE/OUTPT VISIT, NEW, LEVL IV, 45-59 MIN: ICD-10-PCS | Mod: S$GLB,,, | Performed by: INTERNAL MEDICINE

## 2021-10-08 PROCEDURE — 1160F RVW MEDS BY RX/DR IN RCRD: CPT | Mod: CPTII,S$GLB,, | Performed by: INTERNAL MEDICINE

## 2021-10-08 PROCEDURE — 3074F SYST BP LT 130 MM HG: CPT | Mod: CPTII,S$GLB,, | Performed by: INTERNAL MEDICINE

## 2021-10-08 PROCEDURE — 99204 OFFICE O/P NEW MOD 45 MIN: CPT | Mod: S$GLB,,, | Performed by: INTERNAL MEDICINE

## 2021-10-08 PROCEDURE — 3074F PR MOST RECENT SYSTOLIC BLOOD PRESSURE < 130 MM HG: ICD-10-PCS | Mod: CPTII,S$GLB,, | Performed by: INTERNAL MEDICINE

## 2021-10-08 PROCEDURE — 99999 PR PBB SHADOW E&M-EST. PATIENT-LVL IV: ICD-10-PCS | Mod: PBBFAC,,, | Performed by: INTERNAL MEDICINE

## 2021-10-08 PROCEDURE — 1159F MED LIST DOCD IN RCRD: CPT | Mod: CPTII,S$GLB,, | Performed by: INTERNAL MEDICINE

## 2021-10-08 PROCEDURE — 3080F DIAST BP >= 90 MM HG: CPT | Mod: CPTII,S$GLB,, | Performed by: INTERNAL MEDICINE

## 2021-10-08 PROCEDURE — 3080F PR MOST RECENT DIASTOLIC BLOOD PRESSURE >= 90 MM HG: ICD-10-PCS | Mod: CPTII,S$GLB,, | Performed by: INTERNAL MEDICINE

## 2021-10-08 PROCEDURE — 3008F PR BODY MASS INDEX (BMI) DOCUMENTED: ICD-10-PCS | Mod: CPTII,S$GLB,, | Performed by: INTERNAL MEDICINE

## 2021-10-08 PROCEDURE — 36415 COLL VENOUS BLD VENIPUNCTURE: CPT | Performed by: INTERNAL MEDICINE

## 2021-11-30 ENCOUNTER — CLINICAL SUPPORT (OUTPATIENT)
Dept: URGENT CARE | Facility: CLINIC | Age: 61
End: 2021-11-30
Payer: COMMERCIAL

## 2021-11-30 ENCOUNTER — OFFICE VISIT (OUTPATIENT)
Dept: URGENT CARE | Facility: CLINIC | Age: 61
End: 2021-11-30
Payer: COMMERCIAL

## 2021-11-30 DIAGNOSIS — Z11.52 ENCOUNTER FOR SCREENING FOR COVID-19: Primary | ICD-10-CM

## 2021-11-30 LAB
CTP QC/QA: YES
SARS-COV-2 RDRP RESP QL NAA+PROBE: NEGATIVE

## 2021-11-30 PROCEDURE — U0002: ICD-10-PCS | Mod: QW,S$GLB,, | Performed by: NURSE PRACTITIONER

## 2021-11-30 PROCEDURE — 99211 PR OFFICE/OUTPT VISIT, EST, LEVL I: ICD-10-PCS | Mod: S$GLB,,, | Performed by: NURSE PRACTITIONER

## 2021-11-30 PROCEDURE — 99211 OFF/OP EST MAY X REQ PHY/QHP: CPT | Mod: S$GLB,,, | Performed by: NURSE PRACTITIONER

## 2021-11-30 PROCEDURE — U0002 COVID-19 LAB TEST NON-CDC: HCPCS | Mod: QW,S$GLB,, | Performed by: NURSE PRACTITIONER

## 2022-01-29 DIAGNOSIS — D84.9 IMMUNOSUPPRESSED STATUS: ICD-10-CM

## 2022-02-04 DIAGNOSIS — D84.9 IMMUNOSUPPRESSED STATUS: ICD-10-CM

## 2022-02-06 DIAGNOSIS — D84.9 IMMUNOSUPPRESSED STATUS: ICD-10-CM

## 2022-05-09 ENCOUNTER — PATIENT MESSAGE (OUTPATIENT)
Dept: HEMATOLOGY/ONCOLOGY | Facility: CLINIC | Age: 62
End: 2022-05-09
Payer: COMMERCIAL

## 2022-05-09 DIAGNOSIS — C91.10 CLL (CHRONIC LYMPHOCYTIC LEUKEMIA): ICD-10-CM

## 2022-05-10 ENCOUNTER — PATIENT MESSAGE (OUTPATIENT)
Dept: HEMATOLOGY/ONCOLOGY | Facility: CLINIC | Age: 62
End: 2022-05-10
Payer: COMMERCIAL

## 2022-05-10 ENCOUNTER — OFFICE VISIT (OUTPATIENT)
Dept: HEMATOLOGY/ONCOLOGY | Facility: CLINIC | Age: 62
End: 2022-05-10
Payer: COMMERCIAL

## 2022-05-10 DIAGNOSIS — D69.6 THROMBOCYTOPENIA: ICD-10-CM

## 2022-05-10 DIAGNOSIS — Z79.899 IMMUNODEFICIENCY DUE TO CHEMOTHERAPY: ICD-10-CM

## 2022-05-10 DIAGNOSIS — D84.821 IMMUNODEFICIENCY DUE TO CHEMOTHERAPY: ICD-10-CM

## 2022-05-10 DIAGNOSIS — T45.1X5A IMMUNODEFICIENCY DUE TO CHEMOTHERAPY: ICD-10-CM

## 2022-05-10 DIAGNOSIS — C91.10 CLL (CHRONIC LYMPHOCYTIC LEUKEMIA): Primary | ICD-10-CM

## 2022-05-10 PROBLEM — Z79.69 IMMUNODEFICIENCY DUE TO CHEMOTHERAPY: Status: ACTIVE | Noted: 2022-05-10

## 2022-05-10 PROCEDURE — 1160F RVW MEDS BY RX/DR IN RCRD: CPT | Mod: CPTII,95,, | Performed by: INTERNAL MEDICINE

## 2022-05-10 PROCEDURE — 1160F PR REVIEW ALL MEDS BY PRESCRIBER/CLIN PHARMACIST DOCUMENTED: ICD-10-PCS | Mod: CPTII,95,, | Performed by: INTERNAL MEDICINE

## 2022-05-10 PROCEDURE — 1159F PR MEDICATION LIST DOCUMENTED IN MEDICAL RECORD: ICD-10-PCS | Mod: CPTII,95,, | Performed by: INTERNAL MEDICINE

## 2022-05-10 PROCEDURE — 99213 OFFICE O/P EST LOW 20 MIN: CPT | Mod: 95,,, | Performed by: INTERNAL MEDICINE

## 2022-05-10 PROCEDURE — 1159F MED LIST DOCD IN RCRD: CPT | Mod: CPTII,95,, | Performed by: INTERNAL MEDICINE

## 2022-05-10 PROCEDURE — 99213 PR OFFICE/OUTPT VISIT, EST, LEVL III, 20-29 MIN: ICD-10-PCS | Mod: 95,,, | Performed by: INTERNAL MEDICINE

## 2022-05-10 NOTE — PROGRESS NOTES
Subjective:       Patient ID: Rocky Esquivel is a 62 y.o. male.    Chief Complaint: Results, Chemotherapy, and Leukemia    HPI 62-year-old male history of unmutated chronic lymphocytic leukemia seen concurrently at [facility].  Patient continues on ibrutinib 420 mg daily patient seen in video visit consultation last seen at [facility] March 2022. Continue with current treatment recommendations.  The patient states that he tested positive at home test COVID-19 in January of 2022. Did not take any treatment.  Patient is seen in video visit consultation    Past Medical History:   Diagnosis Date    Allergy     as a child    Anxiety     CLL (chronic lymphocytic leukemia) 5/21/2018    Therapy     marital therapy     Family History   Problem Relation Age of Onset    Anxiety disorder Mother     Colon cancer Father     No Known Problems Sister     No Known Problems Brother     No Known Problems Maternal Aunt     No Known Problems Maternal Uncle     No Known Problems Paternal Aunt     No Known Problems Paternal Uncle     Liver cancer Paternal Grandmother     Schizophrenia Maternal Grandmother     Bipolar disorder Daughter      Social History     Socioeconomic History    Marital status:      Spouse name: Heena     Number of children: 4   Tobacco Use    Smoking status: Former Smoker     Types: Cigarettes     Quit date: 2007     Years since quitting: 15.3    Smokeless tobacco: Never Used   Substance and Sexual Activity    Alcohol use: Yes     Alcohol/week: 5.0 standard drinks     Types: 3 Cans of beer, 2 Shots of liquor per week    Drug use: No    Sexual activity: Yes     Partners: Female     Birth control/protection: See Surgical Hx   Social History Narrative     ([employer], 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); 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, orthodontist     Past Surgical History:   Procedure Laterality Date    COLONOSCOPY      2017    NASAL SEPTUM SURGERY  1988    TONSILLECTOMY      1972 approx    VASECTOMY  2003        Labs:  Lab Results   Component Value Date    WBC 5.33 09/28/2021    HGB 15.4 09/28/2021    HCT 47.0 09/28/2021    MCV 88 09/28/2021     09/28/2021     BMP  Lab Results   Component Value Date     09/28/2021    K 4.5 09/28/2021     09/28/2021    CO2 28 09/28/2021    BUN 13 09/28/2021    CREATININE 1.2 09/28/2021    CALCIUM 8.3 (L) 09/28/2021    ANIONGAP 8 09/28/2021    ESTGFRAFRICA >60 09/28/2021    EGFRNONAA >60 09/28/2021     Lab Results   Component Value Date    ALT 27 09/28/2021    AST 20 09/28/2021    ALKPHOS 45 (L) 09/28/2021    BILITOT 1.4 (H) 09/28/2021       Lab Results   Component Value Date    IRON 249 (H) 11/19/2020    TIBC 296 11/19/2020    FERRITIN 285 11/19/2020     No results found for: [id number]  No results found for: FOLATE  No results found for: TSH      Review of Systems   Constitutional: Negative for activity change, appetite change, chills, diaphoresis, fatigue, fever and unexpected weight change.   HENT: Negative for congestion, dental problem, drooling, ear discharge, ear pain, facial swelling, hearing loss, mouth sores, nosebleeds, postnasal drip, rhinorrhea, sinus pressure, sneezing, sore throat, tinnitus, trouble swallowing and voice change.    Eyes: Negative for photophobia, pain, discharge, redness, itching and visual disturbance.   Respiratory: Negative for apnea, cough, choking, chest tightness, shortness of breath, wheezing and stridor.    Cardiovascular: Negative for chest pain, palpitations and leg swelling.   Gastrointestinal: Negative for abdominal distention, abdominal pain, anal bleeding, blood in stool, constipation, diarrhea, nausea, rectal pain and vomiting.   Endocrine: Negative for cold intolerance, heat intolerance, polydipsia, polyphagia and polyuria.   Genitourinary: Negative for decreased urine volume, difficulty urinating, dysuria, enuresis, flank pain, frequency, genital sores, hematuria, penile discharge, penile pain, penile swelling, scrotal  swelling, testicular pain and urgency.   Musculoskeletal: Negative for arthralgias, back pain, gait problem, joint swelling, myalgias, neck pain and neck stiffness.   Skin: Negative for color change, pallor, rash and wound.   Allergic/Immunologic: Negative for environmental allergies, food allergies and immunocompromised state.   Neurological: Negative for dizziness, tremors, seizures, syncope, facial asymmetry, speech difficulty, weakness, light-headedness, numbness and headaches.   Hematological: Negative for adenopathy. Does not bruise/bleed easily.   Psychiatric/Behavioral: Negative for agitation, behavioral problems, confusion, decreased concentration, dysphoric mood, hallucinations, self-injury, sleep disturbance and suicidal ideas. The patient is nervous/anxious. The patient is not hyperactive.        Objective:      Physical Exam  Constitutional:       Appearance: Normal appearance.             Assessment:      1. CLL (chronic lymphocytic leukemia)    2. Thrombocytopenia    3. Immunodeficiency due to chemotherapy           Plan:     The patient location is:  home  The chief complaint leading to consultation is:  Chronic lymphocytic leukemia    Visit type:  Synchronous audio video    Face to Face time with patient: 15 minutes of total time spent on the encounter, which includes face to face time and non-face to face time preparing to see the patient (eg, review of tests), Obtaining and/or reviewing separately obtained history, Documenting clinical information in the electronic or other health record, Independently interpreting results (not separately reported) and communicating results to the patient/family/caregiver, or Care coordination (not separately reported).         Each patient to whom he or she provides medical services by telemedicine is:  (1) informed of the relationship between the physician and patient and the respective role of any other health care provider with respect to management of the  patient; and (2) notified that he or she may decline to receive medical services by telemedicine and may withdraw from such care at any time.    Notes:   Review chart from [facility].  At this time would recommend that patient have CBC LDH CMP and haptoglobin Q 12 weeks communicate through electronic patient portal.  Orders have been placed standing would also recommend that patient consider 4th dose of COVID vaccine as well as nephew shall for prophylaxis increased risk of complications of COVID infections on patients with ibrutinib      Shakeel Georges Jr, MD FACP

## 2022-05-11 ENCOUNTER — PATIENT MESSAGE (OUTPATIENT)
Dept: HEMATOLOGY/ONCOLOGY | Facility: CLINIC | Age: 62
End: 2022-05-11
Payer: COMMERCIAL

## 2022-07-19 ENCOUNTER — TELEPHONE (OUTPATIENT)
Dept: SURGERY | Facility: CLINIC | Age: 62
End: 2022-07-19
Payer: COMMERCIAL

## 2022-07-19 NOTE — TELEPHONE ENCOUNTER
Message sent to Danielle Gordon LPN asking if Dr Georges indeed want Dr Castellanos to see patient to remove his Mediport as this is not a patient of Dr Castellanos's - Want to ensure patient is scheduled with the correct surgeon. - Will reach out to patient accordingly       ----- Message from Mireya Rodriguez MA sent at 7/18/2022  3:10 PM CDT -----    ----- Message -----  From: Danielle Gordon LPN  Sent: 7/18/2022  11:25 AM CDT  To: Mireya Rodriguez MA    Pt needs appt for port removal

## 2022-07-21 ENCOUNTER — PATIENT MESSAGE (OUTPATIENT)
Dept: SURGERY | Facility: CLINIC | Age: 62
End: 2022-07-21
Payer: COMMERCIAL

## 2022-08-16 ENCOUNTER — PATIENT MESSAGE (OUTPATIENT)
Dept: HEMATOLOGY/ONCOLOGY | Facility: CLINIC | Age: 62
End: 2022-08-16
Payer: COMMERCIAL

## 2022-08-16 ENCOUNTER — LAB VISIT (OUTPATIENT)
Dept: LAB | Facility: HOSPITAL | Age: 62
End: 2022-08-16
Attending: INTERNAL MEDICINE
Payer: COMMERCIAL

## 2022-08-16 DIAGNOSIS — C91.10 CLL (CHRONIC LYMPHOCYTIC LEUKEMIA): ICD-10-CM

## 2022-08-16 LAB
ALBUMIN SERPL BCP-MCNC: 3.9 G/DL (ref 3.5–5.2)
ALP SERPL-CCNC: 34 U/L (ref 55–135)
ALT SERPL W/O P-5'-P-CCNC: 26 U/L (ref 10–44)
ANION GAP SERPL CALC-SCNC: 9 MMOL/L (ref 8–16)
AST SERPL-CCNC: 20 U/L (ref 10–40)
BASOPHILS # BLD AUTO: 0.11 K/UL (ref 0–0.2)
BASOPHILS NFR BLD: 2.3 % (ref 0–1.9)
BILIRUB SERPL-MCNC: 1.2 MG/DL (ref 0.1–1)
BUN SERPL-MCNC: 14 MG/DL (ref 8–23)
CALCIUM SERPL-MCNC: 9.1 MG/DL (ref 8.7–10.5)
CHLORIDE SERPL-SCNC: 105 MMOL/L (ref 95–110)
CO2 SERPL-SCNC: 26 MMOL/L (ref 23–29)
CREAT SERPL-MCNC: 1.4 MG/DL (ref 0.5–1.4)
DIFFERENTIAL METHOD: ABNORMAL
EOSINOPHIL # BLD AUTO: 0.1 K/UL (ref 0–0.5)
EOSINOPHIL NFR BLD: 2.7 % (ref 0–8)
ERYTHROCYTE [DISTWIDTH] IN BLOOD BY AUTOMATED COUNT: 13 % (ref 11.5–14.5)
EST. GFR  (NO RACE VARIABLE): 57 ML/MIN/1.73 M^2
GLUCOSE SERPL-MCNC: 100 MG/DL (ref 70–110)
HCT VFR BLD AUTO: 45.6 % (ref 40–54)
HGB BLD-MCNC: 15.1 G/DL (ref 14–18)
IMM GRANULOCYTES # BLD AUTO: 0.02 K/UL (ref 0–0.04)
IMM GRANULOCYTES NFR BLD AUTO: 0.4 % (ref 0–0.5)
LDH SERPL L TO P-CCNC: 142 U/L (ref 110–260)
LYMPHOCYTES # BLD AUTO: 1.5 K/UL (ref 1–4.8)
LYMPHOCYTES NFR BLD: 30.8 % (ref 18–48)
MCH RBC QN AUTO: 29.3 PG (ref 27–31)
MCHC RBC AUTO-ENTMCNC: 33.1 G/DL (ref 32–36)
MCV RBC AUTO: 89 FL (ref 82–98)
MONOCYTES # BLD AUTO: 0.7 K/UL (ref 0.3–1)
MONOCYTES NFR BLD: 13.7 % (ref 4–15)
NEUTROPHILS # BLD AUTO: 2.4 K/UL (ref 1.8–7.7)
NEUTROPHILS NFR BLD: 50.1 % (ref 38–73)
NRBC BLD-RTO: 0 /100 WBC
PLATELET # BLD AUTO: 190 K/UL (ref 150–450)
PMV BLD AUTO: 11.9 FL (ref 9.2–12.9)
POTASSIUM SERPL-SCNC: 4.4 MMOL/L (ref 3.5–5.1)
PROT SERPL-MCNC: 6 G/DL (ref 6–8.4)
RBC # BLD AUTO: 5.15 M/UL (ref 4.6–6.2)
SODIUM SERPL-SCNC: 140 MMOL/L (ref 136–145)
WBC # BLD AUTO: 4.74 K/UL (ref 3.9–12.7)

## 2022-08-16 PROCEDURE — 80053 COMPREHEN METABOLIC PANEL: CPT | Performed by: INTERNAL MEDICINE

## 2022-08-16 PROCEDURE — 83615 LACTATE (LD) (LDH) ENZYME: CPT | Performed by: INTERNAL MEDICINE

## 2022-08-16 PROCEDURE — 36415 COLL VENOUS BLD VENIPUNCTURE: CPT | Performed by: INTERNAL MEDICINE

## 2022-08-16 PROCEDURE — 85025 COMPLETE CBC W/AUTO DIFF WBC: CPT | Performed by: INTERNAL MEDICINE

## 2022-09-06 ENCOUNTER — OFFICE VISIT (OUTPATIENT)
Dept: HEMATOLOGY/ONCOLOGY | Facility: CLINIC | Age: 62
End: 2022-09-06
Payer: COMMERCIAL

## 2022-09-06 VITALS
SYSTOLIC BLOOD PRESSURE: 156 MMHG | OXYGEN SATURATION: 98 % | BODY MASS INDEX: 28.89 KG/M2 | WEIGHT: 232.38 LBS | HEIGHT: 75 IN | HEART RATE: 54 BPM | DIASTOLIC BLOOD PRESSURE: 94 MMHG | TEMPERATURE: 98 F | RESPIRATION RATE: 20 BRPM

## 2022-09-06 DIAGNOSIS — R05.9 COUGH: ICD-10-CM

## 2022-09-06 DIAGNOSIS — T45.1X5A IMMUNODEFICIENCY DUE TO CHEMOTHERAPY: ICD-10-CM

## 2022-09-06 DIAGNOSIS — C91.10 CLL (CHRONIC LYMPHOCYTIC LEUKEMIA): Primary | ICD-10-CM

## 2022-09-06 DIAGNOSIS — D84.821 IMMUNODEFICIENCY DUE TO CHEMOTHERAPY: ICD-10-CM

## 2022-09-06 DIAGNOSIS — Z79.899 IMMUNODEFICIENCY DUE TO CHEMOTHERAPY: ICD-10-CM

## 2022-09-06 PROCEDURE — 99214 OFFICE O/P EST MOD 30 MIN: CPT | Mod: S$GLB,,, | Performed by: INTERNAL MEDICINE

## 2022-09-06 PROCEDURE — 3008F PR BODY MASS INDEX (BMI) DOCUMENTED: ICD-10-PCS | Mod: CPTII,S$GLB,, | Performed by: INTERNAL MEDICINE

## 2022-09-06 PROCEDURE — 99999 PR PBB SHADOW E&M-EST. PATIENT-LVL IV: ICD-10-PCS | Mod: PBBFAC,,, | Performed by: INTERNAL MEDICINE

## 2022-09-06 PROCEDURE — 1159F MED LIST DOCD IN RCRD: CPT | Mod: CPTII,S$GLB,, | Performed by: INTERNAL MEDICINE

## 2022-09-06 PROCEDURE — 99214 PR OFFICE/OUTPT VISIT, EST, LEVL IV, 30-39 MIN: ICD-10-PCS | Mod: S$GLB,,, | Performed by: INTERNAL MEDICINE

## 2022-09-06 PROCEDURE — 3080F DIAST BP >= 90 MM HG: CPT | Mod: CPTII,S$GLB,, | Performed by: INTERNAL MEDICINE

## 2022-09-06 PROCEDURE — 3077F PR MOST RECENT SYSTOLIC BLOOD PRESSURE >= 140 MM HG: ICD-10-PCS | Mod: CPTII,S$GLB,, | Performed by: INTERNAL MEDICINE

## 2022-09-06 PROCEDURE — 3008F BODY MASS INDEX DOCD: CPT | Mod: CPTII,S$GLB,, | Performed by: INTERNAL MEDICINE

## 2022-09-06 PROCEDURE — 1160F RVW MEDS BY RX/DR IN RCRD: CPT | Mod: CPTII,S$GLB,, | Performed by: INTERNAL MEDICINE

## 2022-09-06 PROCEDURE — 99999 PR PBB SHADOW E&M-EST. PATIENT-LVL IV: CPT | Mod: PBBFAC,,, | Performed by: INTERNAL MEDICINE

## 2022-09-06 PROCEDURE — 3077F SYST BP >= 140 MM HG: CPT | Mod: CPTII,S$GLB,, | Performed by: INTERNAL MEDICINE

## 2022-09-06 PROCEDURE — 1160F PR REVIEW ALL MEDS BY PRESCRIBER/CLIN PHARMACIST DOCUMENTED: ICD-10-PCS | Mod: CPTII,S$GLB,, | Performed by: INTERNAL MEDICINE

## 2022-09-06 PROCEDURE — 1159F PR MEDICATION LIST DOCUMENTED IN MEDICAL RECORD: ICD-10-PCS | Mod: CPTII,S$GLB,, | Performed by: INTERNAL MEDICINE

## 2022-09-06 PROCEDURE — 3080F PR MOST RECENT DIASTOLIC BLOOD PRESSURE >= 90 MM HG: ICD-10-PCS | Mod: CPTII,S$GLB,, | Performed by: INTERNAL MEDICINE

## 2022-09-06 NOTE — PROGRESS NOTES
Subjective:       Patient ID: Rocky Esquivel is a 62 y.o. male.    Chief Complaint: Results and Leukemia    HPI 62-year-old male chronic lymphocytic leukemia continues on ibrutinib 420 mg daily patient states a dry cough last 3 weeks.  The patient states otherwise he feels well denies any fever chills or upper respiratory symptoms.    Past Medical History:   Diagnosis Date    Allergy     as a child    Anxiety     CLL (chronic lymphocytic leukemia) 5/21/2018    Therapy     marital therapy     Family History   Problem Relation Age of Onset    Anxiety disorder Mother     Colon cancer Father     No Known Problems Sister     No Known Problems Brother     No Known Problems Maternal Aunt     No Known Problems Maternal Uncle     No Known Problems Paternal Aunt     No Known Problems Paternal Uncle     Liver cancer Paternal Grandmother     Schizophrenia Maternal Grandmother     Bipolar disorder Daughter      Social History     Socioeconomic History    Marital status:      Spouse name: Heena     Number of children: 4   Tobacco Use    Smoking status: Former     Types: Cigarettes     Quit date: 2007     Years since quitting: 15.6    Smokeless tobacco: Never   Substance and Sexual Activity    Alcohol use: Yes     Alcohol/week: 5.0 standard drinks     Types: 3 Cans of beer, 2 Shots of liquor per week    Drug use: No    Sexual activity: Yes     Partners: Female     Birth control/protection: See Surgical Hx   Social History Narrative     ([place], 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); 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, orthodontist     Past Surgical History:   Procedure Laterality Date    COLONOSCOPY      2017    NASAL SEPTUM SURGERY  1988    TONSILLECTOMY      1972 approx    VASECTOMY  2003       Labs:  Lab Results   Component Value Date    WBC 4.74 08/16/2022    HGB 15.1 08/16/2022    HCT 45.6 08/16/2022    MCV 89 08/16/2022     08/16/2022     BMP  Lab Results   Component Value Date     08/16/2022    K 4.4 08/16/2022     08/16/2022     CO2 26 08/16/2022    BUN 14 08/16/2022    CREATININE 1.4 08/16/2022    CALCIUM 9.1 08/16/2022    ANIONGAP 9 08/16/2022    ESTGFRAFRICA >60 09/28/2021    EGFRNONAA >60 09/28/2021     Lab Results   Component Value Date    ALT 26 08/16/2022    AST 20 08/16/2022    ALKPHOS 34 (L) 08/16/2022    BILITOT 1.2 (H) 08/16/2022       Lab Results   Component Value Date    IRON 249 (H) 11/19/2020    TIBC 296 11/19/2020    FERRITIN 285 11/19/2020     No results found for: [id number]  No results found for: FOLATE  No results found for: TSH      Review of Systems   Constitutional:  Negative for activity change, appetite change, chills, diaphoresis, fatigue, fever and unexpected weight change.   HENT:  Negative for congestion, dental problem, drooling, ear discharge, ear pain, facial swelling, hearing loss, mouth sores, nosebleeds, postnasal drip, rhinorrhea, sinus pressure, sneezing, sore throat, tinnitus, trouble swallowing and voice change.    Eyes:  Negative for photophobia, pain, discharge, redness, itching and visual disturbance.   Respiratory:  Positive for cough. Negative for apnea, choking, chest tightness, shortness of breath, wheezing and stridor.    Cardiovascular:  Negative for chest pain, palpitations and leg swelling.   Gastrointestinal:  Negative for abdominal distention, abdominal pain, anal bleeding, blood in stool, constipation, diarrhea, nausea, rectal pain and vomiting.   Endocrine: Negative for cold intolerance, heat intolerance, polydipsia, polyphagia and polyuria.   Genitourinary:  Negative for decreased urine volume, difficulty urinating, dysuria, enuresis, flank pain, frequency, genital sores, hematuria, penile discharge, penile pain, penile swelling, scrotal swelling, testicular pain and urgency.   Musculoskeletal:  Negative for arthralgias, back pain, gait problem, joint swelling, myalgias, neck pain and neck stiffness.   Skin:  Negative for color change, pallor, rash and wound.   Allergic/Immunologic:  Negative for environmental allergies, food allergies and immunocompromised state.   Neurological:  Negative for dizziness, tremors, seizures, syncope, facial asymmetry, speech difficulty, weakness, light-headedness, numbness and headaches.   Hematological:  Negative for adenopathy. Does not bruise/bleed easily.   Psychiatric/Behavioral:  Negative for agitation, behavioral problems, confusion, decreased concentration, dysphoric mood, hallucinations, self-injury, sleep disturbance and suicidal ideas. The patient is not nervous/anxious and is not hyperactive.      Objective:      Physical Exam  Vitals reviewed.   Constitutional:       General: He is not in acute distress.     Appearance: He is well-developed. He is not diaphoretic.   HENT:      Head: Normocephalic.      Right Ear: External ear normal.      Left Ear: External ear normal.      Nose: Nose normal.      Right Sinus: No maxillary sinus tenderness or frontal sinus tenderness.      Left Sinus: No maxillary sinus tenderness or frontal sinus tenderness.      Mouth/Throat:      Pharynx: No oropharyngeal exudate.   Eyes:      General: Lids are normal. No scleral icterus.        Right eye: No discharge.         Left eye: No discharge.      Extraocular Movements:      Right eye: Normal extraocular motion.      Left eye: Normal extraocular motion.      Conjunctiva/sclera:      Right eye: Right conjunctiva is not injected. No hemorrhage.     Left eye: Left conjunctiva is not injected. No hemorrhage.     Pupils: Pupils are equal, round, and reactive to light.   Neck:      Thyroid: No thyromegaly.      Vascular: No JVD.      Trachea: No tracheal deviation.   Cardiovascular:      Rate and Rhythm: Normal rate.   Pulmonary:      Effort: Pulmonary effort is normal. No respiratory distress.      Breath sounds: No stridor.   Abdominal:      General: Bowel sounds are normal.      Palpations: Abdomen is soft. There is no hepatomegaly, splenomegaly or mass.      Tenderness:  There is no abdominal tenderness.   Musculoskeletal:         General: No tenderness. Normal range of motion.      Cervical back: Normal range of motion and neck supple.   Lymphadenopathy:      Head:      Right side of head: No posterior auricular or occipital adenopathy.      Left side of head: No posterior auricular or occipital adenopathy.      Cervical: No cervical adenopathy.      Right cervical: No superficial, deep or posterior cervical adenopathy.     Left cervical: No superficial, deep or posterior cervical adenopathy.      Upper Body:      Right upper body: No supraclavicular adenopathy.      Left upper body: No supraclavicular adenopathy.   Skin:     General: Skin is dry.      Findings: No erythema or rash.      Nails: There is no clubbing.   Neurological:      Mental Status: He is alert and oriented to person, place, and time.      Cranial Nerves: No cranial nerve deficit.      Coordination: Coordination normal.   Psychiatric:         Behavior: Behavior normal.         Thought Content: Thought content normal.         Judgment: Judgment normal.           Assessment:      1. CLL (chronic lymphocytic leukemia)    2. Cough    3. Immunodeficiency due to chemotherapy           Plan:   Reviewed information with patient at this time recommend CT chest.  Immunocompromised state patient will decide whether wishes to proceed no other symptomatology at this 0.4 doses of COVID vaccine.  My recommendations are continue with laboratory studies of CBC LDH CMP haptoglobin Q 3-4 months return in 1 year refill prescription for ibrutinib unless otherwise        Shakeel Georges Jr, MD FACP

## 2022-09-14 ENCOUNTER — PATIENT MESSAGE (OUTPATIENT)
Dept: HEMATOLOGY/ONCOLOGY | Facility: CLINIC | Age: 62
End: 2022-09-14
Payer: COMMERCIAL

## 2022-09-14 DIAGNOSIS — I10 PRIMARY HYPERTENSION: Primary | ICD-10-CM

## 2022-09-23 ENCOUNTER — TELEPHONE (OUTPATIENT)
Dept: CARDIOLOGY | Facility: CLINIC | Age: 62
End: 2022-09-23
Payer: COMMERCIAL

## 2022-09-23 DIAGNOSIS — R00.1 BRADYCARDIA: Primary | ICD-10-CM

## 2022-09-27 ENCOUNTER — OFFICE VISIT (OUTPATIENT)
Dept: CARDIOLOGY | Facility: CLINIC | Age: 62
End: 2022-09-27
Payer: COMMERCIAL

## 2022-09-27 ENCOUNTER — HOSPITAL ENCOUNTER (OUTPATIENT)
Dept: CARDIOLOGY | Facility: HOSPITAL | Age: 62
Discharge: HOME | End: 2022-09-27
Attending: INTERNAL MEDICINE
Payer: COMMERCIAL

## 2022-09-27 VITALS
HEART RATE: 59 BPM | HEIGHT: 75 IN | SYSTOLIC BLOOD PRESSURE: 138 MMHG | BODY MASS INDEX: 28.47 KG/M2 | WEIGHT: 229 LBS | OXYGEN SATURATION: 96 % | DIASTOLIC BLOOD PRESSURE: 80 MMHG

## 2022-09-27 DIAGNOSIS — R00.1 BRADYCARDIA: ICD-10-CM

## 2022-09-27 DIAGNOSIS — R94.31 NONSPECIFIC ABNORMAL ELECTROCARDIOGRAM (ECG) (EKG): ICD-10-CM

## 2022-09-27 DIAGNOSIS — F51.02 ADJUSTMENT INSOMNIA: ICD-10-CM

## 2022-09-27 DIAGNOSIS — F41.9 ANXIETY: ICD-10-CM

## 2022-09-27 DIAGNOSIS — I10 PRIMARY HYPERTENSION: Primary | ICD-10-CM

## 2022-09-27 DIAGNOSIS — C91.10 CLL (CHRONIC LYMPHOCYTIC LEUKEMIA): ICD-10-CM

## 2022-09-27 PROCEDURE — 99214 PR OFFICE/OUTPT VISIT, EST, LEVL IV, 30-39 MIN: ICD-10-PCS | Mod: S$GLB,,, | Performed by: INTERNAL MEDICINE

## 2022-09-27 PROCEDURE — 3008F BODY MASS INDEX DOCD: CPT | Mod: CPTII,S$GLB,, | Performed by: INTERNAL MEDICINE

## 2022-09-27 PROCEDURE — 93005 ELECTROCARDIOGRAM TRACING: CPT

## 2022-09-27 PROCEDURE — 99999 PR PBB SHADOW E&M-EST. PATIENT-LVL IV: CPT | Mod: PBBFAC,,, | Performed by: INTERNAL MEDICINE

## 2022-09-27 PROCEDURE — 3075F PR MOST RECENT SYSTOLIC BLOOD PRESS GE 130-139MM HG: ICD-10-PCS | Mod: CPTII,S$GLB,, | Performed by: INTERNAL MEDICINE

## 2022-09-27 PROCEDURE — 99999 PR PBB SHADOW E&M-EST. PATIENT-LVL IV: ICD-10-PCS | Mod: PBBFAC,,, | Performed by: INTERNAL MEDICINE

## 2022-09-27 PROCEDURE — 3008F PR BODY MASS INDEX (BMI) DOCUMENTED: ICD-10-PCS | Mod: CPTII,S$GLB,, | Performed by: INTERNAL MEDICINE

## 2022-09-27 PROCEDURE — 1160F RVW MEDS BY RX/DR IN RCRD: CPT | Mod: CPTII,S$GLB,, | Performed by: INTERNAL MEDICINE

## 2022-09-27 PROCEDURE — 3079F DIAST BP 80-89 MM HG: CPT | Mod: CPTII,S$GLB,, | Performed by: INTERNAL MEDICINE

## 2022-09-27 PROCEDURE — 3075F SYST BP GE 130 - 139MM HG: CPT | Mod: CPTII,S$GLB,, | Performed by: INTERNAL MEDICINE

## 2022-09-27 PROCEDURE — 93010 ELECTROCARDIOGRAM REPORT: CPT | Mod: ,,, | Performed by: STUDENT IN AN ORGANIZED HEALTH CARE EDUCATION/TRAINING PROGRAM

## 2022-09-27 PROCEDURE — 1160F PR REVIEW ALL MEDS BY PRESCRIBER/CLIN PHARMACIST DOCUMENTED: ICD-10-PCS | Mod: CPTII,S$GLB,, | Performed by: INTERNAL MEDICINE

## 2022-09-27 PROCEDURE — 99214 OFFICE O/P EST MOD 30 MIN: CPT | Mod: S$GLB,,, | Performed by: INTERNAL MEDICINE

## 2022-09-27 PROCEDURE — 93010 EKG 12-LEAD: ICD-10-PCS | Mod: ,,, | Performed by: STUDENT IN AN ORGANIZED HEALTH CARE EDUCATION/TRAINING PROGRAM

## 2022-09-27 PROCEDURE — 3079F PR MOST RECENT DIASTOLIC BLOOD PRESSURE 80-89 MM HG: ICD-10-PCS | Mod: CPTII,S$GLB,, | Performed by: INTERNAL MEDICINE

## 2022-09-27 PROCEDURE — 1159F MED LIST DOCD IN RCRD: CPT | Mod: CPTII,S$GLB,, | Performed by: INTERNAL MEDICINE

## 2022-09-27 PROCEDURE — 1159F PR MEDICATION LIST DOCUMENTED IN MEDICAL RECORD: ICD-10-PCS | Mod: CPTII,S$GLB,, | Performed by: INTERNAL MEDICINE

## 2022-09-27 NOTE — PROGRESS NOTES
Subjective:   Patient ID:  Rocky Esquivel is a 62 y.o. male who presents for cardiac consult of Follow-up    Referring Physician    Shakeel Georges MD [phone] [place] [address]      Reason for consult: preop CV eval, abnormal ECG    HPI  The patient came in today for cardiac consult of Follow-up      Rocky Esquivel is a 62 y.o. male pt with HTN, CLL, anxiety, insomnia presents for follow up CV eval.     10/8/21  Pt of Dr. 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     Here for eval of recent ECG with sinus barrington, poor RWP but chronic. He will have wrist surgery and needs CV eval. He is active, works out daily. He had prior CV workup with nuclear stress for [facility].     22  Pt has been having elevated BP - per notes - My BP readings are averaging 144/98 for past three days. BP 130s/80s. HR 59. BMI - 229 lbs. His BP was elevated at office with Dr. Georges. He has glaucoma and occ eye issues/headaches. NO fatigue. He does eat out quite a bit and drinks occ. Drinks a lot of coffee as well. He also drinks energy/preworkout drinks.   ECG - sinus barrington, poor RWP cannot r/o anterior MI    Patient feels no chest pain, no sob, no leg swelling, no PND, no palpitation, no dizziness, no syncope, no CNS symptoms.    Patient has fairly good exercise tolerance.    Patient is compliant with medications.    FH - father  at age 83, severe CAD    ECG  Sinus bradycardia  Cannot rule out Anterior infarct ,age undetermined  Abnormal ECG  When compared with ECG of 1989 09:43,  Questionable change in The axis  T wave amplitude has decreased in Anterior-lateral leads  Confirmed by ROSA BALBUENA, BEKA ([id number]) on 10/4/2021 4:52:35 PM    Past Medical History:   Diagnosis Date    Allergy     as a child    Anxiety     CLL (chronic lymphocytic leukemia)  5/21/2018    Therapy     marital therapy       Past Surgical History:   Procedure Laterality Date    COLONOSCOPY      2017    NASAL SEPTUM SURGERY  1988    TONSILLECTOMY      1972 approx    VASECTOMY  2003       Social History     Tobacco Use    Smoking status: Former     Types: Cigarettes     Quit date: 2007     Years since quitting: 15.7    Smokeless tobacco: Never   Substance Use Topics    Alcohol use: Yes     Alcohol/week: 5.0 standard drinks     Types: 3 Cans of beer, 2 Shots of liquor per week    Drug use: No       Family History   Problem Relation Age of Onset    Anxiety disorder Mother     Colon cancer Father     No Known Problems Sister     No Known Problems Brother     No Known Problems Maternal Aunt     No Known Problems Maternal Uncle     No Known Problems Paternal Aunt     No Known Problems Paternal Uncle     Liver cancer Paternal Grandmother     Schizophrenia Maternal Grandmother     Bipolar disorder Daughter        Patient's Medications   New Prescriptions    No medications on file   Previous Medications    CHOLECALCIFEROL, VITAMIN D3, 5,000 UNIT TAB    Take 400 Units by mouth.    HYDROCORTISONE 2.5 % LOTION    Apply 2.5 applicators topically 2 (two) times daily.    IBRUTINIB (IMBRUVICA) 420 MG TAB    Take 1 tablet by mouth once daily.    LORAZEPAM (ATIVAN) 1 MG TABLET    Take 1 tablet (1 mg total) by mouth every evening.    PREDNISONE (DELTASONE) 20 MG TABLET    Take 5 tablets (100 mg total) by mouth once daily.    TAMSULOSIN (FLOMAX) 0.4 MG CAP    Take 0.4 mg by mouth.    TURMERIC, BULK, 95 % POWD    Take by mouth.   Modified Medications    No medications on file   Discontinued Medications    No medications on file       Review of Systems   Constitutional: Negative.    HENT: Negative.     Eyes: Negative.    Respiratory: Negative.     Cardiovascular: Negative.    Gastrointestinal: Negative.    Genitourinary: Negative.    Musculoskeletal: Negative.    Skin: Negative.    Neurological: Negative.   "  Endo/Heme/Allergies: Negative.    Psychiatric/Behavioral: Negative.     All 12 systems otherwise negative.    Wt Readings from Last 3 Encounters:   09/27/22 103.9 kg (229 lb)   09/06/22 105.4 kg (232 lb 5.8 oz)   10/08/21 107 kg (235 lb 14.3 oz)     Temp Readings from Last 3 Encounters:   09/06/22 98.1 °F (36.7 °C) (Temporal)   02/25/21 97.8 °F (36.6 °C)   12/31/20 97.8 °F (36.6 °C) (Oral)     BP Readings from Last 3 Encounters:   09/27/22 138/80   09/06/22 (!) 156/94   10/08/21 (!) 128/90     Pulse Readings from Last 3 Encounters:   09/27/22 (!) 59   09/06/22 (!) 54   10/08/21 (!) 55       /80 (BP Location: Left arm, Patient Position: Sitting, BP Method: Large (Manual))   Pulse (!) 59   Ht 6' 3" (1.905 m)   Wt 103.9 kg (229 lb)   SpO2 96%   BMI 28.62 kg/m²     Objective:   Physical Exam  Vitals and nursing note reviewed.   Constitutional:       General: He is not in acute distress.     Appearance: He is well-developed. He is not diaphoretic.   HENT:      Head: Normocephalic and atraumatic.      Nose: Nose normal.   Eyes:      General: No scleral icterus.     Conjunctiva/sclera: Conjunctivae normal.   Neck:      Thyroid: No thyromegaly.      Vascular: No JVD.   Cardiovascular:      Rate and Rhythm: Normal rate and regular rhythm.      Heart sounds: S1 normal and S2 normal. No murmur heard.    No friction rub. No gallop. No S3 or S4 sounds.   Pulmonary:      Effort: Pulmonary effort is normal. No respiratory distress.      Breath sounds: Normal breath sounds. No stridor. No wheezing or rales.   Chest:      Chest wall: No tenderness.   Abdominal:      General: Bowel sounds are normal. There is no distension.      Palpations: Abdomen is soft. There is no mass.      Tenderness: There is no abdominal tenderness. There is no rebound.   Genitourinary:     Comments: Deferred  Musculoskeletal:         General: No tenderness or deformity. Normal range of motion.      Cervical back: Normal range of motion and neck " supple.   Lymphadenopathy:      Cervical: No cervical adenopathy.   Skin:     General: Skin is warm and dry.      Coloration: Skin is not pale.      Findings: No erythema or rash.   Neurological:      Mental Status: He is alert and oriented to person, place, and time.      Motor: No abnormal muscle tone.      Coordination: Coordination normal.   Psychiatric:         Behavior: Behavior normal.         Thought Content: Thought content normal.         Judgment: Judgment normal.       Lab Results   Component Value Date     08/16/2022    K 4.4 08/16/2022     08/16/2022    CO2 26 08/16/2022    BUN 14 08/16/2022    CREATININE 1.4 08/16/2022     08/16/2022    AST 20 08/16/2022    ALT 26 08/16/2022    ALBUMIN 3.9 08/16/2022    PROT 6.0 08/16/2022    BILITOT 1.2 (H) 08/16/2022    WBC 4.74 08/16/2022    HGB 15.1 08/16/2022    HCT 45.6 08/16/2022    MCV 89 08/16/2022     08/16/2022    CHOL 152 10/08/2021    HDL 49 10/08/2021    LDLCALC 92.4 10/08/2021    TRIG 53 10/08/2021     Assessment:      1. Primary hypertension    2. Bradycardia    3. CLL (chronic lymphocytic leukemia)    4. Anxiety    5. Adjustment insomnia    6. Nonspecific abnormal electrocardiogram (ECG) (EKG)        Plan:   1. Bradycardia, chronic, poor RWP - no changes  - stable , no need for PPM    2. Anxiety/adjustment insomnia  - cont tx    3. CLL  - cont tx per heme/onc    4. Elevated BP occ  - recently elevated ; discussed to repeat BP at home with proper arm cuff   - cont low salt diet, avoid alc/caffeine   - order stress ECHO eval valves and LVH and HTN response   - ECG - sinus barrington, poor RWP cannot r/o anterior MI    Thank you for allowing me to participate in this patient's care. Please do not hesitate to contact me with any questions or concerns. Consult note has been forwarded to the referral physician.     Davis Gaston MD, [facility]  Cardiovascular Disease  [facility], [place]  [phone] (P)

## 2022-10-21 ENCOUNTER — OFFICE VISIT (OUTPATIENT)
Dept: URGENT CARE | Facility: CLINIC | Age: 62
End: 2022-10-21
Payer: COMMERCIAL

## 2022-10-21 VITALS
HEART RATE: 63 BPM | SYSTOLIC BLOOD PRESSURE: 150 MMHG | TEMPERATURE: 99 F | RESPIRATION RATE: 16 BRPM | HEIGHT: 75 IN | OXYGEN SATURATION: 96 % | DIASTOLIC BLOOD PRESSURE: 75 MMHG | BODY MASS INDEX: 27.98 KG/M2 | WEIGHT: 225 LBS

## 2022-10-21 DIAGNOSIS — R05.9 COUGH, UNSPECIFIED TYPE: ICD-10-CM

## 2022-10-21 DIAGNOSIS — J00 ACUTE NASOPHARYNGITIS (COMMON COLD): Primary | ICD-10-CM

## 2022-10-21 DIAGNOSIS — R09.81 CONGESTION OF NASAL SINUS: ICD-10-CM

## 2022-10-21 DIAGNOSIS — J02.9 SORE THROAT: ICD-10-CM

## 2022-10-21 LAB
CTP QC/QA: YES
POC MOLECULAR INFLUENZA A AGN: NEGATIVE
POC MOLECULAR INFLUENZA B AGN: NEGATIVE

## 2022-10-21 PROCEDURE — 1159F PR MEDICATION LIST DOCUMENTED IN MEDICAL RECORD: ICD-10-PCS | Mod: CPTII,S$GLB,,

## 2022-10-21 PROCEDURE — 99212 PR OFFICE/OUTPT VISIT, EST, LEVL II, 10-19 MIN: ICD-10-PCS | Mod: S$GLB,,,

## 2022-10-21 PROCEDURE — 3078F PR MOST RECENT DIASTOLIC BLOOD PRESSURE < 80 MM HG: ICD-10-PCS | Mod: CPTII,S$GLB,,

## 2022-10-21 PROCEDURE — 3078F DIAST BP <80 MM HG: CPT | Mod: CPTII,S$GLB,,

## 2022-10-21 PROCEDURE — 87502 POCT INFLUENZA A/B MOLECULAR: ICD-10-PCS | Mod: QW,S$GLB,,

## 2022-10-21 PROCEDURE — 3077F SYST BP >= 140 MM HG: CPT | Mod: CPTII,S$GLB,,

## 2022-10-21 PROCEDURE — 3077F PR MOST RECENT SYSTOLIC BLOOD PRESSURE >= 140 MM HG: ICD-10-PCS | Mod: CPTII,S$GLB,,

## 2022-10-21 PROCEDURE — 1160F PR REVIEW ALL MEDS BY PRESCRIBER/CLIN PHARMACIST DOCUMENTED: ICD-10-PCS | Mod: CPTII,S$GLB,,

## 2022-10-21 PROCEDURE — 1159F MED LIST DOCD IN RCRD: CPT | Mod: CPTII,S$GLB,,

## 2022-10-21 PROCEDURE — 1160F RVW MEDS BY RX/DR IN RCRD: CPT | Mod: CPTII,S$GLB,,

## 2022-10-21 PROCEDURE — 99212 OFFICE O/P EST SF 10 MIN: CPT | Mod: S$GLB,,,

## 2022-10-21 PROCEDURE — 87502 INFLUENZA DNA AMP PROBE: CPT | Mod: QW,S$GLB,,

## 2022-10-21 RX ORDER — LATANOPROST 50 UG/ML
1 SOLUTION/ DROPS OPHTHALMIC NIGHTLY
COMMUNITY
Start: 2022-10-09 | End: 2024-01-04 | Stop reason: SDUPTHER

## 2022-10-21 NOTE — PATIENT INSTRUCTIONS
PLEASE READ YOUR DISCHARGE INSTRUCTIONS ENTIRELY AS IT CONTAINS IMPORTANT INFORMATION.      Please double check ingredient labels on all OTC cold/sinus medications that you take to ensure you are not taking multiple of the same medication      Please drink plenty of fluids.    Please get plenty of rest.    Please return here or go to the Emergency Department for any concerns or worsening of condition.      Tylenol or ibuprofen can also be used as directed for pain and fever unless you have an allergy to them or medical condition such as stomach ulcers, kidney or liver disease or blood thinners etc for which you should not be taking these type of medications.   YOU CAN ALTERNATE TYLENOL AND IBUPROFEN EVERY 3-4 HOURS. Take 1000mg (2 pills) of Extra Strength Acetaminophen (Tylenol) every 6 hours and 600mg (3 pills) of Ibuprofen (Motrin/Advil) every 6 hours, alternating the two so that every 3 hours you take one or the other. Start with the Tylenol, then 3 hours later take the Ibuprofen, then 3 hours later take the Tylenol again, and so on.         For your allergy symptoms and/or runny nose, sinus/ear pressure, congestion:        - You can take over-the-counter claritin, zyrtec, allegra, or xyzal as directed. These are antihistamines that can help with runny nose, nasal congestion, sneezing, and helps to dry up post-nasal drip, which usually causes sore throat and cough.               - If you do NOT have high blood pressure you can take sudafed (pseudoephedrine) over the counter, which is a decongestant. Ask for this from the pharmacy. Do NOT take two decongestant (D) medications at the same time (such as mucinex-D and claritin-D or plain sudafed and claritin D).     -If you DO have high blood pressure, AVOID DECONGESTANTS (I.e., Phenylephrine and Pseudoephedrine). You may take Coricidin HBP for sinus congestion and Delsym for cough.        - You can take plain Mucinex (guaifenesin) 1200 mg twice a day to help loosen  mucous.       Use over the counter flonase or nasocort: one spray each nostril twice daily OR two sprays each nostril once daily until nares dry out, unless you have Glaucoma.   If you find this dries your nose out or your nose bleeds, try using over the counter nasal saline a few minutes prior to using the flonase to moisten the lining of your nose and throughout the day as needed.   Flonase/nasal spray use directions:  1) Use once per day.  2) Blow nose first.  3) Close one nostril and spray flonase up the other nostril while inhaling gently.   4) If you inhale to aggressively, you will have a bitter taste in the back of your mouth.       You can try breathe right strips at night to help you breathe.  A cool mist humidifier in bedroom may help with cough and relieve stuffy nose.     Sinus rinses DO NOT USE TAP WATER, if you must, water must be a rolling boil for 1 minute, let it cool, then use.  May use distilled water, or over the counter nasal saline rinses.  Vics vapor rub in shower to help open nasal passages.  May use nasal gel to keep passages moisturized.  May use Nasal saline sprays during the day for added relief of congestion.   For those who go to the gym, please do not use the sauna or steam room now to clear sinuses.      Sore throat recommendations: Warm fluids, warm salt water gargles, throat lozenges, tea, honey, soup, rest, hydration.      Cough     Honey with sabine to soothe your throat    Dextromethorphan (DM) is a cough suppressant over the counter (ie. mucinex DM, robitussin, delsym; dayquil/nyquil has DM as well.) CHECK INGREDIENT LABELS TO ENSURE YOU ARE NOT DOUBLING UP ON ANY MEDICATIONS!      IF YOU RECEIVED PRESCRIPTION COUGH SUPPRESSANTS: Take prescription cough meds (pills) as prescribed; take prescription cough syrup at night as needed for cough.  Do not take both the prescribed cough pills and syrup at the same time or within 6 hours of each other.  Do not take the cough syrup with  any other sedative medication as it can can cause drowsiness. Do not operate any heavy machinery, drink or drive while taking the cough syrup.    Try taking half a dose first of the cough syrup to see how it affects you.       Please follow up with your primary care doctor or specialist in the next 48-72hrs as needed and if no improvement    If you  smoke, please stop smoking.      Please return or see your primary care doctor if you develop new or worsening symptoms.     Please arrange follow up with your primary medical clinic as soon as possible. You must understand that you've received an Urgent Care treatment only and that you may be released before all of your medical problems are known or treated. You, the patient, will arrange for follow up as instructed. If your symptoms worsen or fail to improve you should go to the Emergency Room.

## 2022-10-21 NOTE — PROGRESS NOTES
"Subjective:       Patient ID: Rocky Esquivel is a 62 y.o. male.    Vitals:  height is 6' 2.5" (1.892 m) and weight is 102.1 kg (225 lb). His tympanic temperature is 98.5 °F (36.9 °C). His blood pressure is 150/75 (abnormal) and his pulse is 63. His respiration is 16 and oxygen saturation is 96%.     Chief Complaint: Sore Throat    Pt is here today with a sore throat and congestion x 3 days. Pt took an at home covid test which was negative.  Would like to rule out flu    Sore Throat   This is a new problem. The current episode started in the past 7 days. The problem has been gradually worsening. Neither side of throat is experiencing more pain than the other. There has been no fever. The pain is at a severity of 7/10. The pain is moderate. Associated symptoms include congestion and coughing. Pertinent negatives include no abdominal pain, diarrhea, drooling, ear discharge, ear pain, headaches, hoarse voice, plugged ear sensation, neck pain, shortness of breath, stridor, swollen glands, trouble swallowing or vomiting. He has had no exposure to strep or mono. Treatments tried: throat lozenges. The treatment provided mild relief.     HENT:  Positive for congestion and sore throat. Negative for ear pain, ear discharge, drooling and trouble swallowing.    Neck: Negative for neck pain.   Respiratory:  Positive for cough. Negative for shortness of breath and stridor.    Gastrointestinal:  Negative for abdominal pain, vomiting and diarrhea.   Neurological:  Negative for headaches.     Objective:      Physical Exam   Constitutional: He is oriented to person, place, and time. He appears well-developed. He is cooperative.  Non-toxic appearance. He does not appear ill. No distress.   HENT:   Head: Normocephalic and atraumatic.   Ears:   Right Ear: Hearing, tympanic membrane, external ear and ear canal normal.   Left Ear: Hearing, tympanic membrane, external ear and ear canal normal.   Nose: Nose normal. No mucosal edema, " rhinorrhea or nasal deformity. No epistaxis. Right sinus exhibits no maxillary sinus tenderness and no frontal sinus tenderness. Left sinus exhibits no maxillary sinus tenderness and no frontal sinus tenderness.   Mouth/Throat: Uvula is midline and mucous membranes are normal. No trismus in the jaw. Normal dentition. No uvula swelling. Posterior oropharyngeal erythema present. No oropharyngeal exudate or posterior oropharyngeal edema.   Eyes: Conjunctivae and lids are normal. No scleral icterus.   Neck: Trachea normal and phonation normal. Neck supple. No edema present. No erythema present. No neck rigidity present.   Cardiovascular: Normal rate, regular rhythm, normal heart sounds and normal pulses.   Pulmonary/Chest: Effort normal and breath sounds normal. No respiratory distress. He has no decreased breath sounds. He has no rhonchi.   Abdominal: Normal appearance.   Musculoskeletal: Normal range of motion.         General: No deformity. Normal range of motion.   Neurological: He is alert and oriented to person, place, and time. He exhibits normal muscle tone. Coordination normal.   Skin: Skin is warm, dry, intact, not diaphoretic and not pale.   Psychiatric: His speech is normal and behavior is normal. Judgment and thought content normal.   Nursing note and vitals reviewed.      Results for orders placed or performed in visit on 10/21/22   POCT Influenza A/B MOLECULAR   Result Value Ref Range    POC Molecular Influenza A Ag Negative Negative, Not Reported    POC Molecular Influenza B Ag Negative Negative, Not Reported     Acceptable Yes        Assessment:       1. Acute nasopharyngitis (common cold)    2. Congestion of nasal sinus    3. Cough, unspecified type    4. Sore throat            Plan:         Labs reviewed with patient. RTC and ED precautions discussed.   Discussed proper use and side effects of prescribed and OTC medications recommended for symptomatic relief.       Acute nasopharyngitis  (common cold)    Congestion of nasal sinus  -     POCT Influenza A/B MOLECULAR    Cough, unspecified type    Sore throat       Patient Instructions   PLEASE READ YOUR DISCHARGE INSTRUCTIONS ENTIRELY AS IT CONTAINS IMPORTANT INFORMATION.      Please double check ingredient labels on all OTC cold/sinus medications that you take to ensure you are not taking multiple of the same medication      Please drink plenty of fluids.    Please get plenty of rest.    Please return here or go to the Emergency Department for any concerns or worsening of condition.      Tylenol or ibuprofen can also be used as directed for pain and fever unless you have an allergy to them or medical condition such as stomach ulcers, kidney or liver disease or blood thinners etc for which you should not be taking these type of medications.   YOU CAN ALTERNATE TYLENOL AND IBUPROFEN EVERY 3-4 HOURS. Take 1000mg (2 pills) of Extra Strength Acetaminophen (Tylenol) every 6 hours and 600mg (3 pills) of Ibuprofen (Motrin/Advil) every 6 hours, alternating the two so that every 3 hours you take one or the other. Start with the Tylenol, then 3 hours later take the Ibuprofen, then 3 hours later take the Tylenol again, and so on.         For your allergy symptoms and/or runny nose, sinus/ear pressure, congestion:        - You can take over-the-counter claritin, zyrtec, allegra, or xyzal as directed. These are antihistamines that can help with runny nose, nasal congestion, sneezing, and helps to dry up post-nasal drip, which usually causes sore throat and cough.               - If you do NOT have high blood pressure you can take sudafed (pseudoephedrine) over the counter, which is a decongestant. Ask for this from the pharmacy. Do NOT take two decongestant (D) medications at the same time (such as mucinex-D and claritin-D or plain sudafed and claritin D).     -If you DO have high blood pressure, AVOID DECONGESTANTS (I.e., Phenylephrine and Pseudoephedrine). You  may take Coricidin HBP for sinus congestion and Delsym for cough.        - You can take plain Mucinex (guaifenesin) 1200 mg twice a day to help loosen mucous.       Use over the counter flonase or nasocort: one spray each nostril twice daily OR two sprays each nostril once daily until nares dry out, unless you have Glaucoma.   If you find this dries your nose out or your nose bleeds, try using over the counter nasal saline a few minutes prior to using the flonase to moisten the lining of your nose and throughout the day as needed.   Flonase/nasal spray use directions:  1) Use once per day.  2) Blow nose first.  3) Close one nostril and spray flonase up the other nostril while inhaling gently.   4) If you inhale to aggressively, you will have a bitter taste in the back of your mouth.       You can try breathe right strips at night to help you breathe.  A cool mist humidifier in bedroom may help with cough and relieve stuffy nose.     Sinus rinses DO NOT USE TAP WATER, if you must, water must be a rolling boil for 1 minute, let it cool, then use.  May use distilled water, or over the counter nasal saline rinses.  Vics vapor rub in shower to help open nasal passages.  May use nasal gel to keep passages moisturized.  May use Nasal saline sprays during the day for added relief of congestion.   For those who go to the gym, please do not use the sauna or steam room now to clear sinuses.      Sore throat recommendations: Warm fluids, warm salt water gargles, throat lozenges, tea, honey, soup, rest, hydration.      Cough     Honey with sabine to soothe your throat    Dextromethorphan (DM) is a cough suppressant over the counter (ie. mucinex DM, robitussin, delsym; dayquil/nyquil has DM as well.) CHECK INGREDIENT LABELS TO ENSURE YOU ARE NOT DOUBLING UP ON ANY MEDICATIONS!      IF YOU RECEIVED PRESCRIPTION COUGH SUPPRESSANTS: Take prescription cough meds (pills) as prescribed; take prescription cough syrup at night as needed  for cough.  Do not take both the prescribed cough pills and syrup at the same time or within 6 hours of each other.  Do not take the cough syrup with any other sedative medication as it can can cause drowsiness. Do not operate any heavy machinery, drink or drive while taking the cough syrup.    Try taking half a dose first of the cough syrup to see how it affects you.       Please follow up with your primary care doctor or specialist in the next 48-72hrs as needed and if no improvement    If you  smoke, please stop smoking.      Please return or see your primary care doctor if you develop new or worsening symptoms.     Please arrange follow up with your primary medical clinic as soon as possible. You must understand that you've received an Urgent Care treatment only and that you may be released before all of your medical problems are known or treated. You, the patient, will arrange for follow up as instructed. If your symptoms worsen or fail to improve you should go to the Emergency Room.

## 2022-11-01 ENCOUNTER — OFFICE VISIT (OUTPATIENT)
Dept: OPHTHALMOLOGY | Facility: CLINIC | Age: 62
End: 2022-11-01
Payer: COMMERCIAL

## 2022-11-01 DIAGNOSIS — H35.372 EPIRETINAL MEMBRANE (ERM) OF LEFT EYE: ICD-10-CM

## 2022-11-01 DIAGNOSIS — H40.1494 PSEUDOEXFOLIATION (PXF) GLAUCOMA, INDETERMINATE STAGE: Primary | ICD-10-CM

## 2022-11-01 PROCEDURE — 1160F PR REVIEW ALL MEDS BY PRESCRIBER/CLIN PHARMACIST DOCUMENTED: ICD-10-PCS | Mod: CPTII,S$GLB,, | Performed by: OPHTHALMOLOGY

## 2022-11-01 PROCEDURE — 92020 GONIOSCOPY: CPT | Mod: S$GLB,,, | Performed by: OPHTHALMOLOGY

## 2022-11-01 PROCEDURE — 1159F PR MEDICATION LIST DOCUMENTED IN MEDICAL RECORD: ICD-10-PCS | Mod: CPTII,S$GLB,, | Performed by: OPHTHALMOLOGY

## 2022-11-01 PROCEDURE — 99999 PR PBB SHADOW E&M-EST. PATIENT-LVL II: CPT | Mod: PBBFAC,,, | Performed by: OPHTHALMOLOGY

## 2022-11-01 PROCEDURE — 1159F MED LIST DOCD IN RCRD: CPT | Mod: CPTII,S$GLB,, | Performed by: OPHTHALMOLOGY

## 2022-11-01 PROCEDURE — 92020 PR SPECIAL EYE EVAL,GONIOSCOPY: ICD-10-PCS | Mod: S$GLB,,, | Performed by: OPHTHALMOLOGY

## 2022-11-01 PROCEDURE — 92004 PR EYE EXAM, NEW PATIENT,COMPREHESV: ICD-10-PCS | Mod: S$GLB,,, | Performed by: OPHTHALMOLOGY

## 2022-11-01 PROCEDURE — 92133 POSTERIOR SEGMENT OCT OPTIC NERVE(OCULAR COHERENCE TOMOGRAPHY) - OU - BOTH EYES: ICD-10-PCS | Mod: S$GLB,,, | Performed by: OPHTHALMOLOGY

## 2022-11-01 PROCEDURE — 92133 CPTRZD OPH DX IMG PST SGM ON: CPT | Mod: S$GLB,,, | Performed by: OPHTHALMOLOGY

## 2022-11-01 PROCEDURE — 99999 PR PBB SHADOW E&M-EST. PATIENT-LVL II: ICD-10-PCS | Mod: PBBFAC,,, | Performed by: OPHTHALMOLOGY

## 2022-11-01 PROCEDURE — 1160F RVW MEDS BY RX/DR IN RCRD: CPT | Mod: CPTII,S$GLB,, | Performed by: OPHTHALMOLOGY

## 2022-11-01 PROCEDURE — 92004 COMPRE OPH EXAM NEW PT 1/>: CPT | Mod: S$GLB,,, | Performed by: OPHTHALMOLOGY

## 2022-11-01 RX ORDER — TIMOLOL MALEATE 5 MG/ML
1 SOLUTION/ DROPS OPHTHALMIC EVERY MORNING
Qty: 5 ML | Refills: 12 | Status: SHIPPED | OUTPATIENT
Start: 2022-11-01 | End: 2023-03-16

## 2022-11-01 NOTE — PROGRESS NOTES
SUBJECTIVE  Rocky Esquivel is 62 y.o. male  Corrected distance visual acuity was 20/20 -2 in the right eye and 20/20 -2 in the left eye.   Chief Complaint   Patient presents with    Glaucoma          HPI    Glaucoma check     No eye pain    Patient states that he is here to establish care with Dr. Clark  Self referred  Dx'd with glaucoma x 2021   IOP was 30 at one time  No laser treatments  No eye surgeries    Latanoprost qhs OU  Last edited by Mirta Mcdonough MA on 11/1/2022  8:06 AM.         Assessment /Plan :  1. Pseudoexfoliation (PXF) glaucoma, indeterminate stage OS  Intraocular pressure (IOP) not within acceptable range relative to target IOP with risk of irreversible visual loss. Better IOP control is recommended. Treatment options may include change or additional medications, Selective Laser Trabeculoplasty (SLT laser), and/or incisional glaucoma surgery. Reviewed importance of continued compliance with treatment and follow up.     Patient chooses  to add Timolol one drop in the left eye every morning  Continue Latanoprost one drop in each eye every night  Sign Record release    Return to clinic in 3-4 weeks  or as needed.  With IOP Check and HVF 24-2 and Pachymetery     2. Epiretinal membrane (ERM) of left eye - monitor for now

## 2022-11-10 ENCOUNTER — OFFICE VISIT (OUTPATIENT)
Dept: OPHTHALMOLOGY | Facility: CLINIC | Age: 62
End: 2022-11-10
Payer: COMMERCIAL

## 2022-11-10 DIAGNOSIS — H40.1494 PSEUDOEXFOLIATION (PXF) GLAUCOMA, INDETERMINATE STAGE: ICD-10-CM

## 2022-11-10 DIAGNOSIS — H00.021 HORDEOLUM INTERNUM RIGHT UPPER EYELID: Primary | ICD-10-CM

## 2022-11-10 PROCEDURE — 99999 PR PBB SHADOW E&M-EST. PATIENT-LVL III: ICD-10-PCS | Mod: PBBFAC,,, | Performed by: OPTOMETRIST

## 2022-11-10 PROCEDURE — 99999 PR PBB SHADOW E&M-EST. PATIENT-LVL III: CPT | Mod: PBBFAC,,, | Performed by: OPTOMETRIST

## 2022-11-10 PROCEDURE — 99203 OFFICE O/P NEW LOW 30 MIN: CPT | Mod: S$GLB,,, | Performed by: OPTOMETRIST

## 2022-11-10 PROCEDURE — 99203 PR OFFICE/OUTPT VISIT, NEW, LEVL III, 30-44 MIN: ICD-10-PCS | Mod: S$GLB,,, | Performed by: OPTOMETRIST

## 2022-11-10 PROCEDURE — 1159F MED LIST DOCD IN RCRD: CPT | Mod: CPTII,S$GLB,, | Performed by: OPTOMETRIST

## 2022-11-10 PROCEDURE — 1159F PR MEDICATION LIST DOCUMENTED IN MEDICAL RECORD: ICD-10-PCS | Mod: CPTII,S$GLB,, | Performed by: OPTOMETRIST

## 2022-11-10 RX ORDER — ERYTHROMYCIN 5 MG/G
OINTMENT OPHTHALMIC
Qty: 3.5 G | Refills: 0 | Status: SHIPPED | OUTPATIENT
Start: 2022-11-10 | End: 2023-03-16

## 2022-11-10 RX ORDER — AMOXICILLIN AND CLAVULANATE POTASSIUM 875; 125 MG/1; MG/1
1 TABLET, FILM COATED ORAL 2 TIMES DAILY
Qty: 20 TABLET | Refills: 0 | Status: SHIPPED | OUTPATIENT
Start: 2022-11-10 | End: 2022-11-20

## 2022-11-10 NOTE — PROGRESS NOTES
HPI    NP to DKT  Patient here today for infection OD  Pain Scale:  6 today  Onset:   36 hours ago  OD, OS, OU:   OD  Discharge:   Tearing  A.M. Matting: Mild Crusting in the corners  Itch:   Yes  Redness:   Yes  Photophobia:   No  Foreign body sensation:   Yes in the corner  Deep pain:   No  Previous occurrence:   No  Drops:   Latanoprost OU, Tobradex OD about 2 hours ago    Last edited by Gisela Gordon, PCT on 11/10/2022  2:09 PM.            Assessment /Plan     For exam results, see Encounter Report.    Hordeolum internum right upper eyelid  -     amoxicillin-clavulanate 875-125mg (AUGMENTIN) 875-125 mg per tablet; Take 1 tablet by mouth 2 (two) times daily. for 10 days  Dispense: 20 tablet; Refill: 0  -     erythromycin (ROMYCIN) ophthalmic ointment; Apply ointment to affected area 2 times daily for 7 days.  Dispense: 3.5 g; Refill: 0    No EOM restriction no APD, start PO Augmentin BID with Erythromycin ry bid and warm compresses. Strict return precautions reviewed. Observe.     Pseudoexfoliation (PXF) glaucoma, indeterminate stage  Continue Latanoprost at bedtime with Timolol qAM, continue per Dr HERNANDEZ     RTC with Dr HERNANDEZ as scheduled, sooner if any changes to vision or worsening symptoms.

## 2022-11-14 ENCOUNTER — PATIENT MESSAGE (OUTPATIENT)
Dept: OPHTHALMOLOGY | Facility: CLINIC | Age: 62
End: 2022-11-14
Payer: COMMERCIAL

## 2022-12-08 ENCOUNTER — OFFICE VISIT (OUTPATIENT)
Dept: OPHTHALMOLOGY | Facility: CLINIC | Age: 62
End: 2022-12-08
Payer: COMMERCIAL

## 2022-12-08 ENCOUNTER — TELEPHONE (OUTPATIENT)
Dept: CARDIOLOGY | Facility: HOSPITAL | Age: 62
End: 2022-12-08
Payer: COMMERCIAL

## 2022-12-08 DIAGNOSIS — H04.129 DRY EYE: ICD-10-CM

## 2022-12-08 DIAGNOSIS — H40.1494 PSEUDOEXFOLIATION (PXF) GLAUCOMA, INDETERMINATE STAGE: Primary | ICD-10-CM

## 2022-12-08 PROCEDURE — 99999 PR PBB SHADOW E&M-EST. PATIENT-LVL III: CPT | Mod: PBBFAC,,, | Performed by: OPHTHALMOLOGY

## 2022-12-08 PROCEDURE — 1159F MED LIST DOCD IN RCRD: CPT | Mod: CPTII,S$GLB,, | Performed by: OPHTHALMOLOGY

## 2022-12-08 PROCEDURE — 92083 HUMPHREY VISUAL FIELD - OU - BOTH EYES: ICD-10-PCS | Mod: S$GLB,,, | Performed by: OPHTHALMOLOGY

## 2022-12-08 PROCEDURE — 1160F PR REVIEW ALL MEDS BY PRESCRIBER/CLIN PHARMACIST DOCUMENTED: ICD-10-PCS | Mod: CPTII,S$GLB,, | Performed by: OPHTHALMOLOGY

## 2022-12-08 PROCEDURE — 99214 PR OFFICE/OUTPT VISIT, EST, LEVL IV, 30-39 MIN: ICD-10-PCS | Mod: S$GLB,,, | Performed by: OPHTHALMOLOGY

## 2022-12-08 PROCEDURE — 1160F RVW MEDS BY RX/DR IN RCRD: CPT | Mod: CPTII,S$GLB,, | Performed by: OPHTHALMOLOGY

## 2022-12-08 PROCEDURE — 99214 OFFICE O/P EST MOD 30 MIN: CPT | Mod: S$GLB,,, | Performed by: OPHTHALMOLOGY

## 2022-12-08 PROCEDURE — 92083 EXTENDED VISUAL FIELD XM: CPT | Mod: S$GLB,,, | Performed by: OPHTHALMOLOGY

## 2022-12-08 PROCEDURE — 99999 PR PBB SHADOW E&M-EST. PATIENT-LVL III: ICD-10-PCS | Mod: PBBFAC,,, | Performed by: OPHTHALMOLOGY

## 2022-12-08 PROCEDURE — 1159F PR MEDICATION LIST DOCUMENTED IN MEDICAL RECORD: ICD-10-PCS | Mod: CPTII,S$GLB,, | Performed by: OPHTHALMOLOGY

## 2022-12-08 NOTE — PROGRESS NOTES
SUBJECTIVE  Rocky Carlos Esquivel is 62 y.o. male  Corrected distance visual acuity was 20/20 in the right eye and 20/25 in the left eye.   Chief Complaint   Patient presents with    Glaucoma     Patient reports for 1 month IOP check. Using gtts as advised. Denies pain or irritation at this time. Patient reports of visual stability since previous visit.             HPI     Glaucoma     Additional comments: Patient reports for 1 month IOP check. Using gtts as   advised. Denies pain or irritation at this time. Patient reports of visual   stability since previous visit.              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 Glaucoma no Fhx Dx'd x 2021 by Aminta  IOP at one time was 30  No lasers  No eye surgeries    Latanoprost qhs OU  Timolol qd OS          Last edited by Dax Cobb on 12/8/2022  8:02 AM.         Assessment /Plan :  1. Pseudoexfoliation (PXF) glaucoma, indeterminate stage Doing well, intraocular pressure (IOP) within acceptable range relative to target IOP and no evidence of progression. Continue current treatment. Reviewed importance of continued compliance with treatment and follow up.      Patient instructed to continue using the following glaucoma medication as follows:  Latanoprost one drop in each eye nightly and Timolol one drop in the left eye daily    Return to clinic in 3-4 months  or as needed.  With IOP Check and GOCT     2. Dry eye - recommend Ivizia one drop in each eye as needed

## 2022-12-15 ENCOUNTER — HOSPITAL ENCOUNTER (OUTPATIENT)
Dept: CARDIOLOGY | Facility: HOSPITAL | Age: 62
Discharge: HOME | End: 2022-12-15
Attending: INTERNAL MEDICINE
Payer: COMMERCIAL

## 2022-12-15 VITALS
SYSTOLIC BLOOD PRESSURE: 135 MMHG | HEIGHT: 75 IN | DIASTOLIC BLOOD PRESSURE: 74 MMHG | WEIGHT: 225 LBS | BODY MASS INDEX: 27.98 KG/M2

## 2022-12-15 DIAGNOSIS — F41.9 ANXIETY: ICD-10-CM

## 2022-12-15 DIAGNOSIS — R00.1 BRADYCARDIA: ICD-10-CM

## 2022-12-15 DIAGNOSIS — F51.02 ADJUSTMENT INSOMNIA: ICD-10-CM

## 2022-12-15 DIAGNOSIS — C91.10 CLL (CHRONIC LYMPHOCYTIC LEUKEMIA): ICD-10-CM

## 2022-12-15 DIAGNOSIS — I10 PRIMARY HYPERTENSION: ICD-10-CM

## 2022-12-15 LAB
AV INDEX (PROSTH): 0.7
AV MEAN GRADIENT: 4 MMHG
AV PEAK GRADIENT: 7 MMHG
AV VALVE AREA: 2.63 CM2
AV VELOCITY RATIO: 0.72
BSA FOR ECHO PROCEDURE: 2.32 M2
CV ECHO LV RWT: 0.46 CM
CV STRESS BASE HR: 50 BPM
DIASTOLIC BLOOD PRESSURE: 74 MMHG
DOP CALC AO PEAK VEL: 1.33 M/S
DOP CALC AO VTI: 29.4 CM
DOP CALC LVOT AREA: 3.7 CM2
DOP CALC LVOT DIAMETER: 2.18 CM
DOP CALC LVOT PEAK VEL: 0.96 M/S
DOP CALC LVOT STROKE VOLUME: 77.22 CM3
DOP CALC RVOT PEAK VEL: 0.77 M/S
DOP CALC RVOT VTI: 18 CM
DOP CALCLVOT PEAK VEL VTI: 20.7 CM
E WAVE DECELERATION TIME: 413.21 MSEC
E/A RATIO: 0.71
E/E' RATIO: 6.53 M/S
ECHO LV POSTERIOR WALL: 1 CM (ref 0.6–1.1)
EJECTION FRACTION: 65 %
FRACTIONAL SHORTENING: 31 % (ref 28–44)
INTERVENTRICULAR SEPTUM: 1.06 CM (ref 0.6–1.1)
IVRT: 97.05 MSEC
LA MAJOR: 4.28 CM
LA MINOR: 4.05 CM
LA WIDTH: 3.5 CM
LEFT ATRIUM SIZE: 3.97 CM
LEFT ATRIUM VOLUME INDEX MOD: 14.3 ML/M2
LEFT ATRIUM VOLUME INDEX: 21.4 ML/M2
LEFT ATRIUM VOLUME MOD: 33 CM3
LEFT ATRIUM VOLUME: 49.15 CM3
LEFT INTERNAL DIMENSION IN SYSTOLE: 3.01 CM (ref 2.1–4)
LEFT VENTRICLE DIASTOLIC VOLUME INDEX: 37.27 ML/M2
LEFT VENTRICLE DIASTOLIC VOLUME: 85.72 ML
LEFT VENTRICLE MASS INDEX: 66 G/M2
LEFT VENTRICLE SYSTOLIC VOLUME INDEX: 15.3 ML/M2
LEFT VENTRICLE SYSTOLIC VOLUME: 35.16 ML
LEFT VENTRICULAR INTERNAL DIMENSION IN DIASTOLE: 4.36 CM (ref 3.5–6)
LEFT VENTRICULAR MASS: 151.8 G
LV LATERAL E/E' RATIO: 6.13 M/S
LV SEPTAL E/E' RATIO: 7 M/S
LVOT MG: 2.13 MMHG
LVOT MV: 0.68 CM/S
MV PEAK A VEL: 0.69 M/S
MV PEAK E VEL: 0.49 M/S
OHS CV CPX 1 MINUTE RECOVERY HEART RATE: 104 BPM
OHS CV CPX 85 PERCENT MAX PREDICTED HEART RATE MALE: 135
OHS CV CPX ESTIMATED METS: 12
OHS CV CPX MAX PREDICTED HEART RATE: 159
OHS CV CPX PATIENT IS FEMALE: 0
OHS CV CPX PATIENT IS MALE: 1
OHS CV CPX PEAK DIASTOLIC BLOOD PRESSURE: 69 MMHG
OHS CV CPX PEAK HEAR RATE: 141 BPM
OHS CV CPX PEAK RATE PRESSURE PRODUCT: NORMAL
OHS CV CPX PEAK SYSTOLIC BLOOD PRESSURE: 214 MMHG
OHS CV CPX PERCENT MAX PREDICTED HEART RATE ACHIEVED: 89
OHS CV CPX RATE PRESSURE PRODUCT PRESENTING: 6750
PISA TR MAX VEL: 2.23 M/S
PULM VEIN S/D RATIO: 1.55
PV MEAN GRADIENT: 1.35 MMHG
PV PEAK D VEL: 0.51 M/S
PV PEAK S VEL: 0.79 M/S
PV PEAK VELOCITY: 1.07 CM/S
RA MAJOR: 5.36 CM
RA PRESSURE: 3 MMHG
RA WIDTH: 3.22 CM
RIGHT VENTRICULAR END-DIASTOLIC DIMENSION: 3.48 CM
SINUS: 3.29 CM
STJ: 2.93 CM
STRESS ANGINA INDEX: 0
STRESS ECHO POST EXERCISE DUR MIN: 10 MINUTES
STRESS ECHO POST EXERCISE DUR SEC: 18 SECONDS
SYSTOLIC BLOOD PRESSURE: 135 MMHG
TDI LATERAL: 0.08 M/S
TDI SEPTAL: 0.07 M/S
TDI: 0.08 M/S
TR MAX PG: 20 MMHG
TRICUSPID ANNULAR PLANE SYSTOLIC EXCURSION: 1.85 CM
TV REST PULMONARY ARTERY PRESSURE: 23 MMHG

## 2022-12-15 PROCEDURE — 93320 STRESS ECHO (CUPID ONLY): ICD-10-PCS | Mod: 26,,, | Performed by: INTERNAL MEDICINE

## 2022-12-15 PROCEDURE — 93325 STRESS ECHO (CUPID ONLY): ICD-10-PCS | Mod: 26,,, | Performed by: INTERNAL MEDICINE

## 2022-12-15 PROCEDURE — 93351 STRESS ECHO (CUPID ONLY): ICD-10-PCS | Mod: 26,,, | Performed by: INTERNAL MEDICINE

## 2022-12-15 PROCEDURE — 93320 DOPPLER ECHO COMPLETE: CPT | Mod: 26,,, | Performed by: INTERNAL MEDICINE

## 2022-12-15 PROCEDURE — 93320 DOPPLER ECHO COMPLETE: CPT

## 2022-12-15 PROCEDURE — 93351 STRESS TTE COMPLETE: CPT | Mod: 26,,, | Performed by: INTERNAL MEDICINE

## 2022-12-15 PROCEDURE — 93325 DOPPLER ECHO COLOR FLOW MAPG: CPT | Mod: 26,,, | Performed by: INTERNAL MEDICINE

## 2023-01-17 ENCOUNTER — TELEPHONE (OUTPATIENT)
Dept: HEMATOLOGY/ONCOLOGY | Facility: CLINIC | Age: 63
End: 2023-01-17
Payer: COMMERCIAL

## 2023-01-17 DIAGNOSIS — C91.10 CLL (CHRONIC LYMPHOCYTIC LEUKEMIA): Primary | ICD-10-CM

## 2023-01-17 NOTE — TELEPHONE ENCOUNTER
----- Message from Steve Machado sent at 1/17/2023  1:31 PM CST -----  Contact: Yesika/[id number]  Yesika would like a call back at [phone], pharmacy number [phone], in regards to patient needing a refill on medication,Ibrutinib (IMBRUVICA) 420 mg Tab.  Thanks   Am

## 2023-01-17 NOTE — TELEPHONE ENCOUNTER
Returned pharmacy phone call. She explained that she needs patient consent to send refill of medication. She stated that patient seen agitated and hung up when she called. She asked me to call and speak with patient to see if wanted refill.      Called patient and spoke with him. He stated that he would like the pharmacy to give him a call back. He also stated that he wanted a referral to [facility] center for behavior health/ADHD. I informed him that I would sent message to provider. Patient verbalized understanding and had no other issues at this time.    Called pharmacy back and informed da that patient had requested a call back. She verbalized understanding and had no other issues at this time.

## 2023-02-05 ENCOUNTER — PATIENT MESSAGE (OUTPATIENT)
Dept: HEMATOLOGY/ONCOLOGY | Facility: CLINIC | Age: 63
End: 2023-02-05
Payer: COMMERCIAL

## 2023-02-06 DIAGNOSIS — F90.2 ATTENTION DEFICIT HYPERACTIVITY DISORDER (ADHD), COMBINED TYPE: Primary | ICD-10-CM

## 2023-02-17 ENCOUNTER — DOCUMENTATION ONLY (OUTPATIENT)
Dept: HEMATOLOGY/ONCOLOGY | Facility: CLINIC | Age: 63
End: 2023-02-17
Payer: COMMERCIAL

## 2023-02-17 DIAGNOSIS — F90.2 ATTENTION DEFICIT HYPERACTIVITY DISORDER (ADHD), COMBINED TYPE: ICD-10-CM

## 2023-02-17 DIAGNOSIS — C91.10 CLL (CHRONIC LYMPHOCYTIC LEUKEMIA): Primary | ICD-10-CM

## 2023-02-17 NOTE — PROGRESS NOTES
Ramakrishna assisted with reaching psych manager to assist with arranging MD call regarding pt. referral. MD to reach Dr. Georges as soon as time allows today. Hiror will remain available.

## 2023-02-20 DIAGNOSIS — C91.10 CLL (CHRONIC LYMPHOCYTIC LEUKEMIA): Primary | ICD-10-CM

## 2023-02-20 DIAGNOSIS — F90.2 ATTENTION DEFICIT HYPERACTIVITY DISORDER (ADHD), COMBINED TYPE: ICD-10-CM

## 2023-03-03 ENCOUNTER — PATIENT MESSAGE (OUTPATIENT)
Dept: HEMATOLOGY/ONCOLOGY | Facility: CLINIC | Age: 63
End: 2023-03-03
Payer: COMMERCIAL

## 2023-03-03 DIAGNOSIS — F90.2 ATTENTION DEFICIT HYPERACTIVITY DISORDER (ADHD), COMBINED TYPE: Primary | ICD-10-CM

## 2023-03-16 ENCOUNTER — OFFICE VISIT (OUTPATIENT)
Dept: OPHTHALMOLOGY | Facility: CLINIC | Age: 63
End: 2023-03-16
Payer: COMMERCIAL

## 2023-03-16 DIAGNOSIS — H40.1494 PSEUDOEXFOLIATION (PXF) GLAUCOMA, INDETERMINATE STAGE: Primary | ICD-10-CM

## 2023-03-16 PROCEDURE — 92012 INTRM OPH EXAM EST PATIENT: CPT | Mod: S$GLB,,, | Performed by: OPHTHALMOLOGY

## 2023-03-16 PROCEDURE — 1160F PR REVIEW ALL MEDS BY PRESCRIBER/CLIN PHARMACIST DOCUMENTED: ICD-10-PCS | Mod: CPTII,S$GLB,, | Performed by: OPHTHALMOLOGY

## 2023-03-16 PROCEDURE — 92012 PR EYE EXAM, EST PATIENT,INTERMED: ICD-10-PCS | Mod: S$GLB,,, | Performed by: OPHTHALMOLOGY

## 2023-03-16 PROCEDURE — 99999 PR PBB SHADOW E&M-EST. PATIENT-LVL III: ICD-10-PCS | Mod: PBBFAC,,, | Performed by: OPHTHALMOLOGY

## 2023-03-16 PROCEDURE — 92133 CPTRZD OPH DX IMG PST SGM ON: CPT | Mod: S$GLB,,, | Performed by: OPHTHALMOLOGY

## 2023-03-16 PROCEDURE — 1159F MED LIST DOCD IN RCRD: CPT | Mod: CPTII,S$GLB,, | Performed by: OPHTHALMOLOGY

## 2023-03-16 PROCEDURE — 1159F PR MEDICATION LIST DOCUMENTED IN MEDICAL RECORD: ICD-10-PCS | Mod: CPTII,S$GLB,, | Performed by: OPHTHALMOLOGY

## 2023-03-16 PROCEDURE — 99999 PR PBB SHADOW E&M-EST. PATIENT-LVL III: CPT | Mod: PBBFAC,,, | Performed by: OPHTHALMOLOGY

## 2023-03-16 PROCEDURE — 1160F RVW MEDS BY RX/DR IN RCRD: CPT | Mod: CPTII,S$GLB,, | Performed by: OPHTHALMOLOGY

## 2023-03-16 PROCEDURE — 92133 POSTERIOR SEGMENT OCT OPTIC NERVE(OCULAR COHERENCE TOMOGRAPHY) - OU - BOTH EYES: ICD-10-PCS | Mod: S$GLB,,, | Performed by: OPHTHALMOLOGY

## 2023-03-16 RX ORDER — DORZOLAMIDE HYDROCHLORIDE AND TIMOLOL MALEATE 20; 5 MG/ML; MG/ML
1 SOLUTION/ DROPS OPHTHALMIC EVERY 12 HOURS
Qty: 5 ML | Refills: 12 | Status: SHIPPED | OUTPATIENT
Start: 2023-03-16 | End: 2024-03-14

## 2023-03-16 NOTE — PROGRESS NOTES
SUBJECTIVE  Rocky Esquivel is 62 y.o. male  Corrected distance visual acuity was 20/20 in the right eye and 20/20 in the left eye.   Chief Complaint   Patient presents with    Glaucoma     Pt reports for 3-4m GOCT and IOP check. Denies any pain or irritation. Va stable.           HPI     Glaucoma     Additional comments: Pt reports for 3-4m GOCT and IOP check. Denies any   pain or irritation. Va stable.            Comments      1. Glaucoma no Fhx Dx'd x 2021 by Aminta  IOP at one time was 30  No lasers  No eye surgeries    Latanoprost qhs OU  Timolol qhs OS            Last edited by Rocky Morales on 3/16/2023  8:39 AM.         Assessment /Plan :  1. Pseudoexfoliation (PXF) glaucoma, indeterminate stage   Doing well, intraocular pressure (IOP) OD within acceptable range relative to target IOP and no evidence of progression. Continue current treatment. Reviewed importance of continued compliance with treatment and follow up.    Intraocular pressure (IOP) OS not within acceptable range relative to target IOP with risk of irreversible visual loss. Better IOP control is recommended. Treatment options may include change or additional medications, Selective Laser Trabeculoplasty (SLT laser), and/or incisional glaucoma surgery. Reviewed importance of continued compliance with treatment and follow up.     Patient chooses  to change Timolol to Dorzolamide/Timolol one drop in the left eye 2 times a day. Continue Latanoprost one drop in each eye every night      Return to clinic in  4-5 weeks   or as needed.  With IOP Check

## 2023-04-18 DIAGNOSIS — R00.1 BRADYCARDIA: Primary | ICD-10-CM

## 2023-04-21 ENCOUNTER — HOSPITAL ENCOUNTER (OUTPATIENT)
Dept: CARDIOLOGY | Facility: HOSPITAL | Age: 63
Discharge: HOME | End: 2023-04-21
Attending: INTERNAL MEDICINE
Payer: COMMERCIAL

## 2023-04-21 ENCOUNTER — OFFICE VISIT (OUTPATIENT)
Dept: CARDIOLOGY | Facility: CLINIC | Age: 63
End: 2023-04-21
Payer: COMMERCIAL

## 2023-04-21 VITALS
OXYGEN SATURATION: 97 % | HEART RATE: 45 BPM | BODY MASS INDEX: 29.52 KG/M2 | WEIGHT: 233 LBS | BODY MASS INDEX: 29.52 KG/M2 | DIASTOLIC BLOOD PRESSURE: 80 MMHG | SYSTOLIC BLOOD PRESSURE: 125 MMHG | WEIGHT: 233 LBS

## 2023-04-21 DIAGNOSIS — F51.02 ADJUSTMENT INSOMNIA: ICD-10-CM

## 2023-04-21 DIAGNOSIS — C91.10 CLL (CHRONIC LYMPHOCYTIC LEUKEMIA): ICD-10-CM

## 2023-04-21 DIAGNOSIS — R94.31 NONSPECIFIC ABNORMAL ELECTROCARDIOGRAM (ECG) (EKG): ICD-10-CM

## 2023-04-21 DIAGNOSIS — R00.1 BRADYCARDIA: Primary | ICD-10-CM

## 2023-04-21 DIAGNOSIS — F41.9 ANXIETY: ICD-10-CM

## 2023-04-21 DIAGNOSIS — R03.0 ELEVATED BLOOD PRESSURE READING: ICD-10-CM

## 2023-04-21 DIAGNOSIS — R00.1 BRADYCARDIA: ICD-10-CM

## 2023-04-21 PROCEDURE — 99213 PR OFFICE/OUTPT VISIT, EST, LEVL III, 20-29 MIN: ICD-10-PCS | Mod: S$GLB,,, | Performed by: INTERNAL MEDICINE

## 2023-04-21 PROCEDURE — 1160F PR REVIEW ALL MEDS BY PRESCRIBER/CLIN PHARMACIST DOCUMENTED: ICD-10-PCS | Mod: CPTII,S$GLB,, | Performed by: INTERNAL MEDICINE

## 2023-04-21 PROCEDURE — 3079F DIAST BP 80-89 MM HG: CPT | Mod: CPTII,S$GLB,, | Performed by: INTERNAL MEDICINE

## 2023-04-21 PROCEDURE — 3074F SYST BP LT 130 MM HG: CPT | Mod: CPTII,S$GLB,, | Performed by: INTERNAL MEDICINE

## 2023-04-21 PROCEDURE — 1159F PR MEDICATION LIST DOCUMENTED IN MEDICAL RECORD: ICD-10-PCS | Mod: CPTII,S$GLB,, | Performed by: INTERNAL MEDICINE

## 2023-04-21 PROCEDURE — 99999 PR PBB SHADOW E&M-EST. PATIENT-LVL III: ICD-10-PCS | Mod: PBBFAC,,, | Performed by: INTERNAL MEDICINE

## 2023-04-21 PROCEDURE — 99999 PR PBB SHADOW E&M-EST. PATIENT-LVL III: CPT | Mod: PBBFAC,,, | Performed by: INTERNAL MEDICINE

## 2023-04-21 PROCEDURE — 99213 OFFICE O/P EST LOW 20 MIN: CPT | Mod: S$GLB,,, | Performed by: INTERNAL MEDICINE

## 2023-04-21 PROCEDURE — 3079F PR MOST RECENT DIASTOLIC BLOOD PRESSURE 80-89 MM HG: ICD-10-PCS | Mod: CPTII,S$GLB,, | Performed by: INTERNAL MEDICINE

## 2023-04-21 PROCEDURE — 93010 ELECTROCARDIOGRAM REPORT: CPT | Mod: ,,, | Performed by: INTERNAL MEDICINE

## 2023-04-21 PROCEDURE — 3008F BODY MASS INDEX DOCD: CPT | Mod: CPTII,S$GLB,, | Performed by: INTERNAL MEDICINE

## 2023-04-21 PROCEDURE — 1159F MED LIST DOCD IN RCRD: CPT | Mod: CPTII,S$GLB,, | Performed by: INTERNAL MEDICINE

## 2023-04-21 PROCEDURE — 3074F PR MOST RECENT SYSTOLIC BLOOD PRESSURE < 130 MM HG: ICD-10-PCS | Mod: CPTII,S$GLB,, | Performed by: INTERNAL MEDICINE

## 2023-04-21 PROCEDURE — 1160F RVW MEDS BY RX/DR IN RCRD: CPT | Mod: CPTII,S$GLB,, | Performed by: INTERNAL MEDICINE

## 2023-04-21 PROCEDURE — 93010 EKG 12-LEAD: ICD-10-PCS | Mod: ,,, | Performed by: INTERNAL MEDICINE

## 2023-04-21 PROCEDURE — 93005 ELECTROCARDIOGRAM TRACING: CPT

## 2023-04-21 PROCEDURE — 3008F PR BODY MASS INDEX (BMI) DOCUMENTED: ICD-10-PCS | Mod: CPTII,S$GLB,, | Performed by: INTERNAL MEDICINE

## 2023-04-21 NOTE — PROGRESS NOTES
Subjective:   Patient ID:  Rocky Esquivel is a 63 y.o. male who presents for cardiac consult of No chief complaint on file.      Referring Physician    Shakeel Georges MD [phone] [place] [address]      Reason for consult: preop CV eval, abnormal ECG    HPI  The patient came in today for cardiac consult of No chief complaint on file.      Rocky Esquivel is a 63 y.o. male pt with bradycardia, CLL, anxiety, insomnia presents for follow up CV eval.     10/8/21  Pt of Dr. 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     Here for eval of recent ECG with sinus barrington, poor RWP but chronic. He will have wrist surgery and needs CV eval. He is active, works out daily. He had prior CV workup with nuclear stress for [employer].     22  Pt has been having elevated BP - per notes - My BP readings are averaging 144/98 for past three days. BP 130s/80s. HR 59. BMI - 229 lbs. His BP was elevated at office with Dr. Georges. He has glaucoma and occ eye issues/headaches. NO fatigue. He does eat out quite a bit and drinks occ. Drinks a lot of coffee as well. He also drinks energy/preworkout drinks.   ECG - sinus barrington, poor RWP cannot r/o anterior MI    23  Stress ECHO 2022 with normal bi V function, grade 1 DD, normal valves; negative for ischemia. BP and HR stable - HR 45 today. BMI 29 - 233 lbs.   Overall feel well, no CP/SOB.     ECG - sinus barrington V rate 45    Patient feels no chest pain, no sob, no leg swelling, no PND, no palpitation, no dizziness, no syncope, no CNS symptoms.    Patient has fairly good exercise tolerance.    Patient is compliant with medications.    FH - father  at age 83, severe CAD    Summary Stress ECHO 2022    The left ventricle is normal in size with concentric remodeling and normal systolic function.  The test was  stopped because the patient experienced fatigue and shortness of breath. The patient reached the end of the protocol.  Grade I left ventricular diastolic dysfunction.  The estimated PA systolic pressure is 23 mmHg.  Normal right ventricular size with normal right ventricular systolic function.  Normal central venous pressure (3 mmHg).  The estimated ejection fraction is 65%.  The stress echo portion of this study is negative for myocardial ischemia.  The patient's exercise capacity was above average.  During stress, the following significant arrhythmias were observed: occasional PACs.  The ECG portion of this study is negative for myocardial ischemia.    ECG  Sinus bradycardia  Cannot rule out Anterior infarct ,age undetermined  Abnormal ECG  When compared with ECG of 1989 09:43,  Questionable change in The axis  T wave amplitude has decreased in Anterior-lateral leads  Confirmed by ROSA BALBUENA, BEKA ([id number]) on 10/4/2021 4:52:35 PM    Past Medical History:   Diagnosis Date    Allergy     as a child    Anxiety     CLL (chronic lymphocytic leukemia) 2018    Therapy     marital therapy       Past Surgical History:   Procedure Laterality Date    COLONOSCOPY      2017    NASAL SEPTUM SURGERY  1988    TONSILLECTOMY      1972 approx    VASECTOMY  2003       Social History     Tobacco Use    Smoking status: Former     Types: Cigarettes     Quit date:      Years since quittin.3    Smokeless tobacco: Never   Substance Use Topics    Alcohol use: Yes     Alcohol/week: 5.0 standard drinks     Types: 3 Cans of beer, 2 Shots of liquor per week    Drug use: No       Family History   Problem Relation Age of Onset    Anxiety disorder Mother     Colon cancer Father     No Known Problems Sister     No Known Problems Brother     No Known Problems Maternal Aunt     No Known Problems Maternal Uncle     No Known Problems Paternal Aunt     No Known Problems Paternal Uncle     Liver cancer Paternal Grandmother      Schizophrenia Maternal Grandmother     Bipolar disorder Daughter        Patient's Medications   New Prescriptions    No medications on file   Previous Medications    CHOLECALCIFEROL, VITAMIN D3, 5,000 UNIT TAB    Take 400 Units by mouth.    DORZOLAMIDE-TIMOLOL 2-0.5% (COSOPT) 22.3-6.8 MG/ML OPHTHALMIC SOLUTION    Place 1 drop into the left eye every 12 (twelve) hours.    HYDROCORTISONE 2.5 % LOTION    Apply 2.5 applicators topically 2 (two) times daily.    IBRUTINIB (IMBRUVICA) 420 MG TAB    Take 1 tablet by mouth once daily.    LATANOPROST 0.005 % OPHTHALMIC SOLUTION    Place 1 drop into both eyes every evening.    LORAZEPAM (ATIVAN) 1 MG TABLET    Take 1 tablet (1 mg total) by mouth every evening.    TAMSULOSIN (FLOMAX) 0.4 MG CAP    Take 0.4 mg by mouth.    TURMERIC, BULK, 95 % POWD    Take by mouth.   Modified Medications    No medications on file   Discontinued Medications    PREDNISONE (DELTASONE) 20 MG TABLET    Take 5 tablets (100 mg total) by mouth once daily.       Review of Systems   Constitutional: Negative.    HENT: Negative.     Eyes: Negative.    Respiratory: Negative.     Cardiovascular: Negative.    Gastrointestinal: Negative.    Genitourinary: Negative.    Musculoskeletal: Negative.    Skin: Negative.    Neurological: Negative.    Endo/Heme/Allergies: Negative.    Psychiatric/Behavioral: Negative.     All 12 systems otherwise negative.    Wt Readings from Last 3 Encounters:   04/21/23 105.7 kg (233 lb)   04/21/23 105.7 kg (233 lb 0.4 oz)   12/15/22 102.1 kg (225 lb)     Temp Readings from Last 3 Encounters:   10/21/22 98.5 °F (36.9 °C) (Tympanic)   09/06/22 98.1 °F (36.7 °C) (Temporal)   02/25/21 97.8 °F (36.6 °C)     BP Readings from Last 3 Encounters:   04/21/23 125/80   12/15/22 135/74   10/21/22 (!) 150/75     Pulse Readings from Last 3 Encounters:   04/21/23 (!) 45   10/21/22 63   09/27/22 (!) 59       /80 (BP Location: Right arm, Patient Position: Sitting)   Pulse (!) 45   Wt 105.7 kg  (233 lb)   SpO2 97%   BMI 29.52 kg/m²     Objective:   Physical Exam  Vitals and nursing note reviewed.   Constitutional:       General: He is not in acute distress.     Appearance: He is well-developed. He is not diaphoretic.   HENT:      Head: Normocephalic and atraumatic.      Nose: Nose normal.   Eyes:      General: No scleral icterus.     Conjunctiva/sclera: Conjunctivae normal.   Neck:      Thyroid: No thyromegaly.      Vascular: No JVD.   Cardiovascular:      Rate and Rhythm: Normal rate and regular rhythm.      Heart sounds: S1 normal and S2 normal. No murmur heard.    No friction rub. No gallop. No S3 or S4 sounds.   Pulmonary:      Effort: Pulmonary effort is normal. No respiratory distress.      Breath sounds: Normal breath sounds. No stridor. No wheezing or rales.   Chest:      Chest wall: No tenderness.   Abdominal:      General: Bowel sounds are normal. There is no distension.      Palpations: Abdomen is soft. There is no mass.      Tenderness: There is no abdominal tenderness. There is no rebound.   Genitourinary:     Comments: Deferred  Musculoskeletal:         General: No tenderness or deformity. Normal range of motion.      Cervical back: Normal range of motion and neck supple.   Lymphadenopathy:      Cervical: No cervical adenopathy.   Skin:     General: Skin is warm and dry.      Coloration: Skin is not pale.      Findings: No erythema or rash.   Neurological:      Mental Status: He is alert and oriented to person, place, and time.      Motor: No abnormal muscle tone.      Coordination: Coordination normal.   Psychiatric:         Behavior: Behavior normal.         Thought Content: Thought content normal.         Judgment: Judgment normal.       Lab Results   Component Value Date     12/15/2022    K 4.5 12/15/2022     12/15/2022    CO2 26 12/15/2022    BUN 18 12/15/2022    CREATININE 1.3 12/15/2022    GLU 90 12/15/2022    AST 19 12/15/2022    ALT 23 12/15/2022    ALBUMIN 4.1  12/15/2022    PROT 6.2 12/15/2022    BILITOT 1.2 (H) 12/15/2022    WBC 6.46 12/15/2022    HGB 15.4 12/15/2022    HCT 46.9 12/15/2022    MCV 89 12/15/2022     12/15/2022    CHOL 152 10/08/2021    HDL 49 10/08/2021    LDLCALC 92.4 10/08/2021    TRIG 53 10/08/2021     Assessment:      1. Bradycardia    2. CLL (chronic lymphocytic leukemia)    3. Nonspecific abnormal electrocardiogram (ECG) (EKG)    4. Anxiety    5. Adjustment insomnia    6. Elevated blood pressure reading          Plan:   1. Bradycardia, chronic, poor RWP - no changes  - stable , no need for PPM    2. Anxiety/adjustment insomnia  - cont tx    3. CLL  - cont tx per heme/onc    4. Elevated BP occ - normal today   - recently elevated ; discussed to repeat BP at home with proper arm cuff   - cont low salt diet, avoid alc/caffeine   - Stress ECHO 12/2022 with normal bi V function, grade 1 DD, normal valves; negative for ischemia.     RTC in 1 year     Thank you for allowing me to participate in this patient's care. Please do not hesitate to contact me with any questions or concerns. Consult note has been forwarded to the referral physician.     Davis Gaston MD, [facility]  Cardiovascular Disease  [facility], [place]  [phone] (P)

## 2023-05-29 ENCOUNTER — OFFICE VISIT (OUTPATIENT)
Dept: OPHTHALMOLOGY | Facility: CLINIC | Age: 63
End: 2023-05-29
Payer: COMMERCIAL

## 2023-05-29 ENCOUNTER — OFFICE VISIT (OUTPATIENT)
Dept: PSYCHIATRY | Facility: CLINIC | Age: 63
End: 2023-05-29
Payer: COMMERCIAL

## 2023-05-29 DIAGNOSIS — H40.1492 PSEUDOEXFOLIATION (PXF) GLAUCOMA, MODERATE STAGE: Primary | ICD-10-CM

## 2023-05-29 DIAGNOSIS — H04.129 DRY EYE: ICD-10-CM

## 2023-05-29 DIAGNOSIS — F90.2 ATTENTION DEFICIT HYPERACTIVITY DISORDER (ADHD), COMBINED TYPE: ICD-10-CM

## 2023-05-29 DIAGNOSIS — H35.372 EPIRETINAL MEMBRANE (ERM) OF LEFT EYE: ICD-10-CM

## 2023-05-29 PROCEDURE — 99999 PR PBB SHADOW E&M-EST. PATIENT-LVL II: CPT | Mod: PBBFAC,,, | Performed by: SOCIAL WORKER

## 2023-05-29 PROCEDURE — 99999 PR PBB SHADOW E&M-EST. PATIENT-LVL II: ICD-10-PCS | Mod: PBBFAC,,, | Performed by: SOCIAL WORKER

## 2023-05-29 PROCEDURE — 99214 OFFICE O/P EST MOD 30 MIN: CPT | Mod: S$GLB,,, | Performed by: OPHTHALMOLOGY

## 2023-05-29 PROCEDURE — 90791 PSYCH DIAGNOSTIC EVALUATION: CPT | Mod: S$GLB,,, | Performed by: SOCIAL WORKER

## 2023-05-29 PROCEDURE — 90791 PR PSYCHIATRIC DIAGNOSTIC EVALUATION: ICD-10-PCS | Mod: S$GLB,,, | Performed by: SOCIAL WORKER

## 2023-05-29 PROCEDURE — 1159F MED LIST DOCD IN RCRD: CPT | Mod: CPTII,S$GLB,, | Performed by: OPHTHALMOLOGY

## 2023-05-29 PROCEDURE — 99999 PR PBB SHADOW E&M-EST. PATIENT-LVL III: CPT | Mod: PBBFAC,,, | Performed by: OPHTHALMOLOGY

## 2023-05-29 PROCEDURE — 1159F PR MEDICATION LIST DOCUMENTED IN MEDICAL RECORD: ICD-10-PCS | Mod: CPTII,S$GLB,, | Performed by: OPHTHALMOLOGY

## 2023-05-29 PROCEDURE — 99214 PR OFFICE/OUTPT VISIT, EST, LEVL IV, 30-39 MIN: ICD-10-PCS | Mod: S$GLB,,, | Performed by: OPHTHALMOLOGY

## 2023-05-29 PROCEDURE — 99999 PR PBB SHADOW E&M-EST. PATIENT-LVL III: ICD-10-PCS | Mod: PBBFAC,,, | Performed by: OPHTHALMOLOGY

## 2023-05-29 RX ORDER — KETOROLAC TROMETHAMINE 5 MG/ML
SOLUTION OPHTHALMIC
Qty: 5 ML | Refills: 0 | Status: SHIPPED | OUTPATIENT
Start: 2023-05-29 | End: 2023-08-03

## 2023-05-29 NOTE — PROGRESS NOTES
"Psychiatry Initial Visit (PhD/LCSW)  Diagnostic Interview - CPT 44610    Date: 5/29/2023    Site: [facility]    Referral source: Shakeel Georges MD    Clinical status of patient: Outpatient    Rocky Esquivel, a 63 y.o. male, for initial evaluation visit.  Met with patient.    Chief complaint/reason for encounter: anxiety    History of present illness:  He has had a loss of focus, forgetfulness, and he can't "keep" a thought.  He wonders if he has adult ADHD.  He knows it is being overdiagnosed.  He "just did it."  It bothers him when she can't stay focused.  He did take half a dose of Adderall.  He had a very productive day.  He is successful and satisfied with his job.  He is retiring because he thought it was time.  He gets tangential thoughts.  He wonders if he has anxiety disorder or he is depressed.  The lack of concentration and focus has gotten worse in the last year.  He realized it might have always been around.  He has had maybe five panic attacks in his life.  He feels like he is always upregulated.  His hands are a bit shaky.  He has no problems with sleep.  He denies crying spells.  He has some irritability due to some road rage.  His wife would say he is a hypochondriac.  He takes advantage of his insurance.  He see Dr. Georges for cancer treatment.  He was prepping for surgery.  After blood work, they discovered his white blood cells were elevated.        Pain: 0    Symptoms:   Mood: poor concentration  Anxiety: excessive anxiety/worry and restlessness/keyed up  Substance abuse: denied  Cognitive functioning: denied  Health behaviors:  leukemia    Psychiatric history: He thinks he was in counseling in fourth grade.  He couldn't focus.  He moved to the classroom with the strict nun.  He has not been to counseling before.  He denies any thoughts of suicide past or present.    Medical history: In 2018, he was diagnosed with leukemia.  He takes one pill a day.  It is in his blood system.  He wonders " "how long it will work.  He had a hernia repair.  He has glaucoma.      Family history of psychiatric illness: His daughter, Susan, has been diagnosed with bipolar disorder.  She is doing well now.  His daughter, Brenda, is on antidepressant.  His maternal grandmother had schizophrenia.  His maternal aunts had depression and anxiety.  His mother has "something."  She is now on Zoloft.  His paternal grandparents were functioning alcoholics.      Social history (marriage, employment, etc.):  Patient's mother, Marcellus, 90, lives in [place].  She was  for five years.  She then became a homemaker.  Patient's father Elgin,  in .  He was 83.  He had heart disease.  He was a pharmacist.  He had his own store, but he worked for larger companies.  He was promoted in the company which prompted a moved to  in .  His parents were  over fifty years.  He believes it was a good marriage.  He is the third of five children, 2 boys and three girls.  Her siblings are: Sandi, 65, lives in [place].  She is  with three children.  She is retired.  She was a .  Maciel, 64, lives in [place].  She is .  Her   from lung cancer.  She has no children.  She is retired from being a dietician.  Mars, 54, lives in [place], [place].  He has never .  He has one child.  He manages a liquor store.  Remedios, 52, lives in [place].  She is a physical therapy assistant.  She is  with two daughters.  He does not have constant contact with his siblings.  He is not very close to his brother.  He had more "behavioral issues" when he was young.  He was born and raised in [place], [place].  He moved here in his sophomore year in high school.  He graduated from [employer] in .  He knew everyone in the small town of [place].  He went to [employer].  He has a bachelors in Zoology in .  He went to [employer] dental school.  He got his doctorate in " "1987.  He got out in 1989 with his doctorate in orthodontics.  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  He used to be at the [employer].  He is retiring  in a year.  He has a previous resident who bought his practice.  He is an  at the dental school since 1989.  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  He will teach one to two days per week.  He gets paid only $500 per month.  He does it to give back.  His  to Vianey, 60, for 36 years.  She works in his office as a manager.  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  She likes the job.  She is training to be a tennis umpire.  They have four children.  They are:  Carlos, 34, lives in [place].  He is  with no children.  He is a pediatric neurologist.  He just started his practice.  Susan, 32, lives in [place].  She is  with no children.  She does some kind of software thing.  She went to [place] for her masters program.  She met a isa. Barby, 29, lives in [place], [place].  She is getting  on Christmas of this year.  She is in marketing apparel. Brenda, 23, lives in [place], [place].  She is getting her masters in human anatomy.  She will be applying to medical school.  She works in the [employer].  She wants to be the medical examiner.  He was raised Pentecostalism.  He is now agnostic.  He likes to weight lift.  He likes to garden.  He likes to travel.  He enjoyed [place] and [place].  He likes to hike.  He reads often.  He read "Blueprint" which goes into the heritability of things by [employer].  He will sit and daydream.      Substance use:   Alcohol: He will drink on Thursday, Friday, and Saturday.  He will have three glasses of Evans.   Drugs: He smoked marijuana when he was young.   Tobacco: He smoked cigarettes for ten years.  He stopped in 2007.  He used Chantix.   Caffeine: He will drink three cups of coffee per day.    Current medications and drug reactions (include " OTC, herbal): see medication list   He has Ativan.  He does not take it often.  He takes one every two weeks when he is encountering a stressful situation.      Strengths and liabilities: Strength: Patient accepts guidance/feedback, Strength: Patient is expressive/articulate., Strength: Patient is intelligent., Strength: Patient is motivated for change., Strength: Patient has positive support network., Strength: Patient has reasonable judgment., Strength: Patient is stable., Liability: Patient lacks coping skills.    Current Evaluation:     Mental Status Exam:  General Appearance:  age appropriate, casually dressed, neatly groomed   Speech: normal tone, normal rate, normal pitch, normal volume      Level of Cooperation: cooperative      Thought Processes: normal and logical   Mood: anxious      Thought Content: normal, no suicidality, no homicidality, delusions, or paranoia   Affect: anxious   Orientation: Oriented x3   Memory: recent >  intact, remote >  intact   Attention Span & Concentration: intact   Fund of General Knowledge: intact and appropriate to age and level of education   Abstract Reasoning:    Judgment & Insight: fair     Language  intact     Diagnostic Impression - Plan:       ICD-10-CM ICD-9-CM   1. Attention deficit hyperactivity disorder (ADHD), combined type  F90.2 314.01   2.      Adjustment disorder with mixed emotions    Plan:individual psychotherapy  He has an appointment scheduled with Dr. Garza for psychological testing for ADHD.    Return to Clinic: as scheduled    Length of Service (minutes): 45

## 2023-05-29 NOTE — PROGRESS NOTES
SUBJECTIVE  Rocky Carlos Esquivel is 63 y.o. male  Corrected distance visual acuity was 20/20 in the right eye and 20/25 in the left eye.   Chief Complaint   Patient presents with    Glaucoma     Patient states va is stable. Patient states he is using his eye drops as directed. Patient denies any pain or irritation at this time.          HPI     Glaucoma     Additional comments: Patient states va is stable. Patient states he is   using his eye drops as directed. Patient denies any pain or irritation at   this time.           Comments      1. Moderate Pseudoexfoliation Glaucoma OS (Tmax 18/32) goal= 17  (Considering SLT)  Mild COAG OD vs suspect  (no Fhx Dx'd x 2021 by Aminta)    Latanoprost qhs OU  Dorzolamide/Timolol BID OS            Last edited by Venice Dahl on 5/29/2023  2:41 PM.         Assessment /Plan :  1. Pseudoexfoliation (PXF) glaucoma, moderate stage  OS  Intraocular pressure (IOP) not within acceptable range relative to target IOP with risk of irreversible visual loss. Better IOP control is recommended. Treatment options may include change or additional medications, Selective Laser Trabeculoplasty (SLT laser), and/or incisional glaucoma surgery. Reviewed importance of continued compliance with treatment and follow up.     Patient chooses schedule SLT  OS  Use keto after laser     If no response to SLT, consider Rhopressa trial    2. Dry eye - follow - continue tears    3. Epiretinal membrane (ERM) of left eye         Latanoprost qhs OU  Continue Dorzolamide/Timolol BID OS

## 2023-07-12 ENCOUNTER — OUTSIDE PLACE OF SERVICE (OUTPATIENT)
Dept: OPHTHALMOLOGY | Facility: CLINIC | Age: 63
End: 2023-07-12
Payer: COMMERCIAL

## 2023-07-12 PROCEDURE — 65855 TRABECULOPLASTY LASER SURG: CPT | Mod: LT,,, | Performed by: OPHTHALMOLOGY

## 2023-07-12 PROCEDURE — 65855 PR TRABECULOPLASTY LASER SURGERY: ICD-10-PCS | Mod: LT,,, | Performed by: OPHTHALMOLOGY

## 2023-08-03 ENCOUNTER — OFFICE VISIT (OUTPATIENT)
Dept: OPHTHALMOLOGY | Facility: CLINIC | Age: 63
End: 2023-08-03
Payer: COMMERCIAL

## 2023-08-03 DIAGNOSIS — H40.1492 PSEUDOEXFOLIATION (PXF) GLAUCOMA, MODERATE STAGE: Primary | ICD-10-CM

## 2023-08-03 PROCEDURE — 1160F RVW MEDS BY RX/DR IN RCRD: CPT | Mod: CPTII,S$GLB,, | Performed by: OPHTHALMOLOGY

## 2023-08-03 PROCEDURE — 92012 PR EYE EXAM, EST PATIENT,INTERMED: ICD-10-PCS | Mod: S$GLB,,, | Performed by: OPHTHALMOLOGY

## 2023-08-03 PROCEDURE — 1160F PR REVIEW ALL MEDS BY PRESCRIBER/CLIN PHARMACIST DOCUMENTED: ICD-10-PCS | Mod: CPTII,S$GLB,, | Performed by: OPHTHALMOLOGY

## 2023-08-03 PROCEDURE — 1159F MED LIST DOCD IN RCRD: CPT | Mod: CPTII,S$GLB,, | Performed by: OPHTHALMOLOGY

## 2023-08-03 PROCEDURE — 92012 INTRM OPH EXAM EST PATIENT: CPT | Mod: S$GLB,,, | Performed by: OPHTHALMOLOGY

## 2023-08-03 PROCEDURE — 99999 PR PBB SHADOW E&M-EST. PATIENT-LVL III: ICD-10-PCS | Mod: PBBFAC,,, | Performed by: OPHTHALMOLOGY

## 2023-08-03 PROCEDURE — 99999 PR PBB SHADOW E&M-EST. PATIENT-LVL III: CPT | Mod: PBBFAC,,, | Performed by: OPHTHALMOLOGY

## 2023-08-03 PROCEDURE — 1159F PR MEDICATION LIST DOCUMENTED IN MEDICAL RECORD: ICD-10-PCS | Mod: CPTII,S$GLB,, | Performed by: OPHTHALMOLOGY

## 2023-08-03 NOTE — PROGRESS NOTES
SUBJECTIVE  Rocky Carlos Esquivel is 63 y.o. male  Corrected distance visual acuity was 20/20 in the right eye and 20/25+ in the left eye.   Chief Complaint   Patient presents with    Post-op Evaluation     SLT OS 07/12/23          HPI     Post-op Evaluation     Additional comments: SLT OS 07/12/23           Comments    States that his vision is stable and that he has been compliant with gtts   as directed. Denies any pain or discomfort.     1. Moderate Pseudoexfoliation Glaucoma OS (Tmax 18/32) goal= 17  (Considering SLT)  Mild COAG OD vs suspect  (no Fhx Dx'd x 2021 by Aminta)    Latanoprost qhs OU  Dorzolamide/Timolol BID OS            Last edited by Dotty Orozco on 8/3/2023 10:44 AM.         Assessment /Plan :  1. Pseudoexfoliation (PXF) glaucoma, moderate stage - nice response to the SLT OS  Doing well, intraocular pressure (IOP) within acceptable range relative to target IOP and no evidence of progression. Continue current treatment. Reviewed importance of continued compliance with treatment and follow up.      Patient instructed to continue using the following glaucoma medication as follows:  Latanoprost one drop in each eye nightly and Dorzolamide/Timolol (Cosopt) or Timolol one drop in the left eye every morning    Return to clinic in 4 months  or as needed.  With Dilation, GOCT and HVF 24-2

## 2023-08-29 ENCOUNTER — EVALUATION (OUTPATIENT)
Dept: PSYCHIATRY | Facility: CLINIC | Age: 63
End: 2023-08-29
Payer: COMMERCIAL

## 2023-08-29 DIAGNOSIS — F90.2 ATTENTION DEFICIT HYPERACTIVITY DISORDER (ADHD), COMBINED TYPE: ICD-10-CM

## 2023-08-29 DIAGNOSIS — F41.1 GAD (GENERALIZED ANXIETY DISORDER): ICD-10-CM

## 2023-08-29 DIAGNOSIS — R41.840 ATTENTION AND CONCENTRATION DEFICIT: Primary | ICD-10-CM

## 2023-08-29 PROCEDURE — 99999 PR PBB SHADOW E&M-EST. PATIENT-LVL III: CPT | Mod: PBBFAC,,, | Performed by: STUDENT IN AN ORGANIZED HEALTH CARE EDUCATION/TRAINING PROGRAM

## 2023-08-29 PROCEDURE — 99999 PR PBB SHADOW E&M-EST. PATIENT-LVL III: ICD-10-PCS | Mod: PBBFAC,,, | Performed by: STUDENT IN AN ORGANIZED HEALTH CARE EDUCATION/TRAINING PROGRAM

## 2023-08-29 PROCEDURE — 90791 PSYCH DIAGNOSTIC EVALUATION: CPT | Mod: S$GLB,,, | Performed by: STUDENT IN AN ORGANIZED HEALTH CARE EDUCATION/TRAINING PROGRAM

## 2023-08-29 PROCEDURE — 90791 PR PSYCHIATRIC DIAGNOSTIC EVALUATION: ICD-10-PCS | Mod: S$GLB,,, | Performed by: STUDENT IN AN ORGANIZED HEALTH CARE EDUCATION/TRAINING PROGRAM

## 2023-08-29 NOTE — PROGRESS NOTES
"Initial Intake     Name: Rocky Esquivel Date of Intake: 2023   MRN: [id number]  Psychologist: Ludy Garza, Ph.D.   : 1960  Guardian/s (if applicable):    Age: 63 Years     Gender: Male         CPT CODE:        29868   VISIT TYPE:                  In Person   LOCATION of Psychologist: [facility]   LOCATION of Patient: [facility]   INDIVIDUALS PRESENT:    LENGTH OF SESSION:   PATIENT Face-to-Face TIME:    INSURANCE: [employer] - Hd Blue Cross Blue Shield               REASON FOR ENCOUNTER  Rocky  presented for an Intake Interview in preparation for his psychoeducational evaluation.       BEHAVIORAL OBSERVATION    Rocky was neatly dressed and well-groomed. No remarkable signs of anxiety or depression were observed. Verbal productivity was average. Content and rate of speech were intact. He was cooperative and responded readily to direct inquiry. Rocky's attention span and ability to concentrate were age-appropriate in the context of his intake session. Judgment and insight appeared age appropriate.      EPIC PROBLEM HISTORY at Intake    ICD-10-CM ICD-9-CM   1. Attention and concentration deficit  R41.840 799.51   2. Attention deficit hyperactivity disorder (ADHD), combined type  F90.2 314.01   3. VAN (generalized anxiety disorder)  F41.1 300.02       Patient Active Problem List    Diagnosis Date Noted    Thrombocytopenia 05/10/2022    Immunodeficiency due to chemotherapy 05/10/2022    Anxiety     Bradycardia     Adjustment insomnia 2018    CLL (chronic lymphocytic leukemia) 2018       INTERVIEW  Rocky provided the following information at his Intake session.    Current Concerns?  Adult ADHD concerns. Presented with symptoms all his life.   Scatterbrained, can't focus, can't stay on task.   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 Not depressed or suicidal.   He is very organized and he is not. Can create clutter. He tries to stay organized but will lose track of what he is doing.   He journals daily.   Calmer when organized or things in a certain place    Previous evaluations (when/who/dx)?  None    Academic History    Rocky was an A student though all grades except for elementary. In 4th grade, kicked out of school because unable to focus or stay still. Had difficulties with studying. Would study 2 weeks ahead of time. He would write things over and over to make it stick. He would also do work in "small doses" to get it done. He is constantly thinking about the steps of his day and then lose track. He attended [employer] in [place], [place]. For high school, he attended [employer] in [place]. He then attended [employer] with a B.S. in Zoology. Then [employer] doctorate in dental surgery. Post Doc'ed. 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     Mental Health History    Rocky's mood most days was described as  "happy and satisfied." Ms. Esquivel expressed concerns about symptoms of anxiety (e.g., performance anxiety prior to public speaking - does not fear it but the "motor is always running." Possible panic attacks). No medication for anxiety. Significant life events/psychosocial stressors include his cancer diagnosis. No significant history of mental health treatment or evaluation was reported. No concerns about a history of psychological/emotional/physical/sexual abuse, alcohol/substance misuse, or thoughts/behaviors related to self-harm or harm to others were reported.    Family & Social History    Rocky was born and raised in [place], [place]. His family (mom, dad, 2 older sisters, older brother, younger sister) moved to [place] in " 1975. A family history of schizophrenia (maternal grandmother), anxiety (mother, daughter, son), dyslexia (sister), bipolar disorder (daughter) was reported. Extracurricular activities include working out, journal, exercise, reading a lot, and travel. He is  and has 4 children (ages 34, 32, 28, 23).     Birth, Early Development, & Medical History     Rocky was born the product of an uncomplicated pregnancy and delivery. No developmental delays were reported. Rocky reported that he was passing out a lot in 1st grade. In 4th grade, his doctors found that he was cross-eyed dominant. Medical history is positive for CLL (Chronic Lyphacetic Leukemia). He wears glasses to correct his vision.   Current Outpatient Medications:     cholecalciferol, vitamin D3, 5,000 unit Tab, Take 400 Units by mouth., Disp: , Rfl:     dorzolamide-timolol 2-0.5% (COSOPT) 22.3-6.8 mg/mL ophthalmic solution, Place 1 drop into the left eye every 12 (twelve) hours., Disp: 5 mL, Rfl: 12    hydrocortisone 2.5 % lotion, Apply 2.5 applicators topically 2 (two) times daily., Disp: , Rfl:     ibrutinib (IMBRUVICA) 420 mg Tab, Take 1 tablet by mouth once daily., Disp: 28 tablet, Rfl: 11    latanoprost 0.005 % ophthalmic solution, Place 1 drop into both eyes every evening., Disp: , Rfl:     LORazepam (ATIVAN) 1 MG tablet, Take 1 tablet (1 mg total) by mouth every evening., Disp: 30 tablet, Rfl: 0    tamsulosin (FLOMAX) 0.4 mg Cap, Take 0.4 mg by mouth., Disp: , Rfl:     turmeric, bulk, 95 % Powd, Take by mouth., Disp: , Rfl:      Rocky reported that he sleeps well. No insomnia. No significant vision or hearing concerns were reported nor was any history of speech/occupational/physical therapy, major accident with injury, head trauma, or loss of consciousness.     DIAGNOSTIC IMPRESSIONS  Current encounter:  Difficulties with inattention, impulsivity & hyperactivity  Difficulty with mood regulation  By History:     ICD-10-CM ICD-9-CM   1.  Attention and concentration deficit  R41.840 799.51   2. Attention deficit hyperactivity disorder (ADHD), combined type  F90.2 314.01   3. VAN (generalized anxiety disorder)  F41.1 300.02      Patient Active Problem List    Diagnosis Date Noted    Thrombocytopenia 05/10/2022    Immunodeficiency due to chemotherapy 05/10/2022    Anxiety     Bradycardia     Adjustment insomnia 05/31/2018    CLL (chronic lymphocytic leukemia) 05/21/2018         DIAGNOSTIC IMPRESSION  Based on the diagnostic evaluation and background information provided, the current diagnostic impression is: attention and concentration deficit; anxiety     PLAN/ Pre-Authorization Request  Purpose for evaluation: To determine and clarify the diagnosis in order to inform treatment recommendations and access to community resources  Previous Diagnosis: Anxiety  Diagnosis/Diagnoses to Rule-Out: ADHD, Anxiety  Measures Requested: WAIS-IV, WJ-IV-ACH, CPT, Brown EF/A, MMPI-3     CPT Requested and units:   Psychological testing codes   14573 = 60 minutes (1 unit), 37915 = 60 minutes (1 unit)  64203 = 30 minutes, 76434 (1 unit) = 210 minutes (7 units)  51940 = 2 unit     Total Time: 6 hours      Is Feedback requested: Yes, Billed as [id number]         Ludy Garza, Ph.D.  Psychologist  [facility]

## 2023-09-08 ENCOUNTER — TELEPHONE (OUTPATIENT)
Dept: OPHTHALMOLOGY | Facility: CLINIC | Age: 63
End: 2023-09-08
Payer: COMMERCIAL

## 2023-09-08 NOTE — TELEPHONE ENCOUNTER
Faxed notes to number below    ----- Message from Jie Chen sent at 9/8/2023 12:09 PM CDT -----  Contact: Dr. Aminta Vitale Office  ..Type:  Patient Requesting Call    Who Called: Dr Aminta Vitale  Office   Does the patient know what this is regarding?: Pt has laser surgery in August and needs notes faxed over to [phone]  Would the patient rather a call back or a response via [facility]?  Call  Best Call Back Number: [phone]  Additional Information:

## 2023-09-11 DIAGNOSIS — C91.10 CLL (CHRONIC LYMPHOCYTIC LEUKEMIA): ICD-10-CM

## 2023-09-20 ENCOUNTER — TELEPHONE (OUTPATIENT)
Dept: HEMATOLOGY/ONCOLOGY | Facility: CLINIC | Age: 63
End: 2023-09-20
Payer: COMMERCIAL

## 2023-09-20 NOTE — TELEPHONE ENCOUNTER
Returned pharmacy phone call and spoke with Sandrita, pharmacist. Verified dose reductions. Informed her that I can not see where rx was sent due to different provider changing rx. I provided Heath Mackey's contact informed at [facility]. Sandrita verbalized understanding and had no other issues at this time.

## 2023-09-20 NOTE — TELEPHONE ENCOUNTER
----- Message from Steve Machado sent at 9/20/2023  9:07 AM CDT -----  Contact: Anne/Yahaira  Type:  Pharmacy Calling to Clarify an RX    Name of Caller:Anne   Pharmacy Name:[id number]   Prescription Name:Ibrutinib (IMBRUVICA) 420 mg Tab  What do they need to clarify?:needs to verify that patient is getting medication filled through provider pharmacy that way they can discontinue filling it  Best Call Back Number:[phone]  Additional Information:   Thanks   Am

## 2023-10-11 ENCOUNTER — PATIENT MESSAGE (OUTPATIENT)
Dept: HEMATOLOGY/ONCOLOGY | Facility: CLINIC | Age: 63
End: 2023-10-11
Payer: COMMERCIAL

## 2023-10-11 ENCOUNTER — PATIENT MESSAGE (OUTPATIENT)
Dept: PSYCHIATRY | Facility: CLINIC | Age: 63
End: 2023-10-11
Payer: COMMERCIAL

## 2023-10-11 DIAGNOSIS — C91.10 CLL (CHRONIC LYMPHOCYTIC LEUKEMIA): Primary | ICD-10-CM

## 2023-10-20 NOTE — PROGRESS NOTES
[facility]  Psychology  Progress Note  Psychological Testing Results Session (PhD/LCSW)    Patient Name: Rocky Esquivel  MRN: [id number]    Patient Class: [facility]  Primary Care Provider: Kristal, Primary Doctor    Psychiatry Visit (PhD/LCSW)  Individual Psychotherapy - CPT 31717    Date: 10/23/2023    Site: [facility]    Referral source: Shakeel Georges MD    Clinical status of patient: Outpatient    Rocky Esquivel, a 63 y.o. male, for resulting visit.  Met with patient.    Chief complaint/reason for encounter: Psychological Evaluation and treatment recommendations    Summary: Rocky Esquivel (age 63) presented at the [facility] for a psychoeducational assessment to examine the etiology of his attention and emotional difficulties.     Serving as significant normative strengths, results of Rocky's psychoeducational assessment indicated that he was able to demonstrate high average to very superior skills in the areas of verbal comprehension (99th percentile), perceptual reasoning (99th percentile), working memory (82nd percentile), processing speed (91st percentile), broad reading (87th percentile), broad mathematics (82nd percentile) and broad written language (91st percentile).     Hindering his ability to utilize these skills, Rocky's history, and his impaired performance on a neurocognitive measure of visual sustained attention were consistent with the diagnosis of Attention-Deficit/Hyperactivity Disorder (ADHD), Predominantly Inattentive Presentation.     Diagnostic Impression - Plan:       ICD-10-CM ICD-9-CM   1. ADHD (attention deficit hyperactivity disorder), inattentive type  F90.0 314.00       Plan:medication management by physician    Return to Clinic: as needed    Length of Service (minutes): 45          Ludy Garza, PhD  Psychologist  [facility]     
Name band;

## 2023-10-23 ENCOUNTER — OFFICE VISIT (OUTPATIENT)
Dept: PSYCHIATRY | Facility: CLINIC | Age: 63
End: 2023-10-23
Payer: COMMERCIAL

## 2023-10-23 ENCOUNTER — PATIENT MESSAGE (OUTPATIENT)
Dept: PSYCHIATRY | Facility: CLINIC | Age: 63
End: 2023-10-23
Payer: COMMERCIAL

## 2023-10-23 DIAGNOSIS — F90.0 ADHD (ATTENTION DEFICIT HYPERACTIVITY DISORDER), INATTENTIVE TYPE: Primary | ICD-10-CM

## 2023-10-23 PROCEDURE — 1159F PR MEDICATION LIST DOCUMENTED IN MEDICAL RECORD: ICD-10-PCS | Mod: CPTII,S$GLB,, | Performed by: STUDENT IN AN ORGANIZED HEALTH CARE EDUCATION/TRAINING PROGRAM

## 2023-10-23 PROCEDURE — 96138 PSYCL/NRPSYC TECH 1ST: CPT | Mod: S$GLB,,, | Performed by: STUDENT IN AN ORGANIZED HEALTH CARE EDUCATION/TRAINING PROGRAM

## 2023-10-23 PROCEDURE — 96138 PR PSYCH/NEUROPSYCH TEST ADMIN/SCORING, BY TECH, 2+ TESTS, 1ST 30 MIN: ICD-10-PCS | Mod: S$GLB,,, | Performed by: STUDENT IN AN ORGANIZED HEALTH CARE EDUCATION/TRAINING PROGRAM

## 2023-10-23 PROCEDURE — 96139 PR PSYCH/NEUROPSYCH TEST ADMIN/SCORING, BY TECH, 2+ TESTS, EA ADDTL 30 MIN: ICD-10-PCS | Mod: S$GLB,,, | Performed by: STUDENT IN AN ORGANIZED HEALTH CARE EDUCATION/TRAINING PROGRAM

## 2023-10-23 PROCEDURE — 1159F MED LIST DOCD IN RCRD: CPT | Mod: CPTII,S$GLB,, | Performed by: STUDENT IN AN ORGANIZED HEALTH CARE EDUCATION/TRAINING PROGRAM

## 2023-10-23 PROCEDURE — 96131 PSYCL TST EVAL PHYS/QHP EA: CPT | Mod: S$GLB,,, | Performed by: STUDENT IN AN ORGANIZED HEALTH CARE EDUCATION/TRAINING PROGRAM

## 2023-10-23 PROCEDURE — 90834 PSYTX W PT 45 MINUTES: CPT | Mod: S$GLB,,, | Performed by: STUDENT IN AN ORGANIZED HEALTH CARE EDUCATION/TRAINING PROGRAM

## 2023-10-23 PROCEDURE — 96139 PSYCL/NRPSYC TST TECH EA: CPT | Mod: S$GLB,,, | Performed by: STUDENT IN AN ORGANIZED HEALTH CARE EDUCATION/TRAINING PROGRAM

## 2023-10-23 PROCEDURE — 90834 PR PSYCHOTHERAPY W/PATIENT, 45 MIN: ICD-10-PCS | Mod: S$GLB,,, | Performed by: STUDENT IN AN ORGANIZED HEALTH CARE EDUCATION/TRAINING PROGRAM

## 2023-10-23 PROCEDURE — 96131 PR PSYCHOLOGIC TEST EVAL SVCS, EA ADDTL HR: ICD-10-PCS | Mod: S$GLB,,, | Performed by: STUDENT IN AN ORGANIZED HEALTH CARE EDUCATION/TRAINING PROGRAM

## 2023-10-23 PROCEDURE — 96130 PR PSYCHOLOGIC TEST EVAL SVCS, 1ST HR: ICD-10-PCS | Mod: S$GLB,,, | Performed by: STUDENT IN AN ORGANIZED HEALTH CARE EDUCATION/TRAINING PROGRAM

## 2023-10-23 PROCEDURE — 99999 PR PBB SHADOW E&M-EST. PATIENT-LVL II: CPT | Mod: PBBFAC,,, | Performed by: STUDENT IN AN ORGANIZED HEALTH CARE EDUCATION/TRAINING PROGRAM

## 2023-10-23 PROCEDURE — 96130 PSYCL TST EVAL PHYS/QHP 1ST: CPT | Mod: S$GLB,,, | Performed by: STUDENT IN AN ORGANIZED HEALTH CARE EDUCATION/TRAINING PROGRAM

## 2023-10-23 PROCEDURE — 99999 PR PBB SHADOW E&M-EST. PATIENT-LVL II: ICD-10-PCS | Mod: PBBFAC,,, | Performed by: STUDENT IN AN ORGANIZED HEALTH CARE EDUCATION/TRAINING PROGRAM

## 2023-10-23 NOTE — LETTER
October 23, 2023        Shakeel Georges MD  [id number] [facility]  [place] [address]             '[facility]  [address] [place] [address] [address] [place] [address]  Phone: [phone]  Fax: [phone]   Patient: Rocky Esquivel   MR Number: [id number]   YOB: 1960   Date of Visit: 10/23/2023       Dear Dr. Georges:    Thank you for referring Rocky Esquivel to me for evaluation. Below are the relevant portions of my assessment and plan of care.     Rocky Esquivel (age 63) presented at the [facility] for a psychoeducational assessment to examine the etiology of his attention and focus difficulties.     Serving as significant normative strengths, results of Rocky's psychoeducational assessment indicated that he was able to demonstrate high average to very superior skills in the areas of verbal comprehension (99th percentile), perceptual reasoning (99th percentile), working memory (82nd percentile), processing speed (91st percentile), broad reading (87th percentile), broad mathematics (82nd percentile) and broad written language (91st percentile).     Hindering his ability to utilize these skills, Rocky's history, and his impaired performance on a neurocognitive measure of visual sustained attention were consistent with the diagnosis of Attention-Deficit/Hyperactivity Disorder (ADHD), Predominantly Inattentive Presentation.        If you have questions, please do not hesitate to call me. I look forward to following Rocky along with you.          Sincerely,         Ludy Garza, PhD           CC  No Recipients

## 2023-10-24 ENCOUNTER — PATIENT MESSAGE (OUTPATIENT)
Dept: PSYCHIATRY | Facility: CLINIC | Age: 63
End: 2023-10-24
Payer: COMMERCIAL

## 2023-10-24 ENCOUNTER — PATIENT MESSAGE (OUTPATIENT)
Dept: HEMATOLOGY/ONCOLOGY | Facility: CLINIC | Age: 63
End: 2023-10-24
Payer: COMMERCIAL

## 2023-10-24 NOTE — TELEPHONE ENCOUNTER
Kody Flowers,     Thanks for checking. Yes. Let him know that Dr. Echavarria should have received a copy of the report yesterday by fax and that he should contact [facility] to schedule an appointment.

## 2023-12-14 ENCOUNTER — OFFICE VISIT (OUTPATIENT)
Dept: OPHTHALMOLOGY | Facility: CLINIC | Age: 63
End: 2023-12-14
Payer: COMMERCIAL

## 2023-12-14 ENCOUNTER — LAB VISIT (OUTPATIENT)
Dept: LAB | Facility: HOSPITAL | Age: 63
End: 2023-12-14
Attending: INTERNAL MEDICINE
Payer: COMMERCIAL

## 2023-12-14 DIAGNOSIS — H40.1492 PSEUDOEXFOLIATION (PXF) GLAUCOMA, MODERATE STAGE: Primary | ICD-10-CM

## 2023-12-14 DIAGNOSIS — C91.10 CLL (CHRONIC LYMPHOCYTIC LEUKEMIA): ICD-10-CM

## 2023-12-14 DIAGNOSIS — H40.1111 PRIMARY OPEN ANGLE GLAUCOMA (POAG) OF RIGHT EYE, MILD STAGE: ICD-10-CM

## 2023-12-14 DIAGNOSIS — H35.372 EPIRETINAL MEMBRANE (ERM) OF LEFT EYE: ICD-10-CM

## 2023-12-14 LAB
ALBUMIN SERPL BCP-MCNC: 4 G/DL (ref 3.5–5.2)
ALP SERPL-CCNC: 41 U/L (ref 55–135)
ALT SERPL W/O P-5'-P-CCNC: 25 U/L (ref 10–44)
ANION GAP SERPL CALC-SCNC: 10 MMOL/L (ref 8–16)
AST SERPL-CCNC: 20 U/L (ref 10–40)
BASOPHILS # BLD AUTO: 0.09 K/UL (ref 0–0.2)
BASOPHILS NFR BLD: 2 % (ref 0–1.9)
BILIRUB SERPL-MCNC: 0.7 MG/DL (ref 0.1–1)
BUN SERPL-MCNC: 23 MG/DL (ref 8–23)
CALCIUM SERPL-MCNC: 8.8 MG/DL (ref 8.7–10.5)
CHLORIDE SERPL-SCNC: 106 MMOL/L (ref 95–110)
CO2 SERPL-SCNC: 26 MMOL/L (ref 23–29)
CREAT SERPL-MCNC: 1.2 MG/DL (ref 0.5–1.4)
DIFFERENTIAL METHOD: ABNORMAL
EOSINOPHIL # BLD AUTO: 0.2 K/UL (ref 0–0.5)
EOSINOPHIL NFR BLD: 3.6 % (ref 0–8)
ERYTHROCYTE [DISTWIDTH] IN BLOOD BY AUTOMATED COUNT: 13.1 % (ref 11.5–14.5)
EST. GFR  (NO RACE VARIABLE): >60 ML/MIN/1.73 M^2
GLUCOSE SERPL-MCNC: 90 MG/DL (ref 70–110)
HAPTOGLOB SERPL-MCNC: 117 MG/DL (ref 30–250)
HCT VFR BLD AUTO: 46.8 % (ref 40–54)
HGB BLD-MCNC: 15.6 G/DL (ref 14–18)
IMM GRANULOCYTES # BLD AUTO: 0.02 K/UL (ref 0–0.04)
IMM GRANULOCYTES NFR BLD AUTO: 0.4 % (ref 0–0.5)
LDH SERPL L TO P-CCNC: 151 U/L (ref 110–260)
LYMPHOCYTES # BLD AUTO: 0.8 K/UL (ref 1–4.8)
LYMPHOCYTES NFR BLD: 18.7 % (ref 18–48)
MCH RBC QN AUTO: 29.5 PG (ref 27–31)
MCHC RBC AUTO-ENTMCNC: 33.3 G/DL (ref 32–36)
MCV RBC AUTO: 89 FL (ref 82–98)
MONOCYTES # BLD AUTO: 0.6 K/UL (ref 0.3–1)
MONOCYTES NFR BLD: 12.9 % (ref 4–15)
NEUTROPHILS # BLD AUTO: 2.8 K/UL (ref 1.8–7.7)
NEUTROPHILS NFR BLD: 62.4 % (ref 38–73)
NRBC BLD-RTO: 0 /100 WBC
PLATELET # BLD AUTO: 182 K/UL (ref 150–450)
PMV BLD AUTO: 11.8 FL (ref 9.2–12.9)
POTASSIUM SERPL-SCNC: 4.6 MMOL/L (ref 3.5–5.1)
PROT SERPL-MCNC: 6.3 G/DL (ref 6–8.4)
RBC # BLD AUTO: 5.28 M/UL (ref 4.6–6.2)
SODIUM SERPL-SCNC: 142 MMOL/L (ref 136–145)
WBC # BLD AUTO: 4.49 K/UL (ref 3.9–12.7)

## 2023-12-14 PROCEDURE — 1159F PR MEDICATION LIST DOCUMENTED IN MEDICAL RECORD: ICD-10-PCS | Mod: CPTII,S$GLB,, | Performed by: OPHTHALMOLOGY

## 2023-12-14 PROCEDURE — 83010 ASSAY OF HAPTOGLOBIN QUANT: CPT | Performed by: INTERNAL MEDICINE

## 2023-12-14 PROCEDURE — 92083 EXTENDED VISUAL FIELD XM: CPT | Mod: S$GLB,,, | Performed by: OPHTHALMOLOGY

## 2023-12-14 PROCEDURE — 1159F MED LIST DOCD IN RCRD: CPT | Mod: CPTII,S$GLB,, | Performed by: OPHTHALMOLOGY

## 2023-12-14 PROCEDURE — 1160F PR REVIEW ALL MEDS BY PRESCRIBER/CLIN PHARMACIST DOCUMENTED: ICD-10-PCS | Mod: CPTII,S$GLB,, | Performed by: OPHTHALMOLOGY

## 2023-12-14 PROCEDURE — 83615 LACTATE (LD) (LDH) ENZYME: CPT | Performed by: INTERNAL MEDICINE

## 2023-12-14 PROCEDURE — 80053 COMPREHEN METABOLIC PANEL: CPT | Performed by: INTERNAL MEDICINE

## 2023-12-14 PROCEDURE — 92014 PR EYE EXAM, EST PATIENT,COMPREHESV: ICD-10-PCS | Mod: S$GLB,,, | Performed by: OPHTHALMOLOGY

## 2023-12-14 PROCEDURE — 36415 COLL VENOUS BLD VENIPUNCTURE: CPT | Performed by: INTERNAL MEDICINE

## 2023-12-14 PROCEDURE — 92133 POSTERIOR SEGMENT OCT OPTIC NERVE(OCULAR COHERENCE TOMOGRAPHY) - OU - BOTH EYES: ICD-10-PCS | Mod: S$GLB,,, | Performed by: OPHTHALMOLOGY

## 2023-12-14 PROCEDURE — 85025 COMPLETE CBC W/AUTO DIFF WBC: CPT | Performed by: INTERNAL MEDICINE

## 2023-12-14 PROCEDURE — 92133 CPTRZD OPH DX IMG PST SGM ON: CPT | Mod: S$GLB,,, | Performed by: OPHTHALMOLOGY

## 2023-12-14 PROCEDURE — 92083 HUMPHREY VISUAL FIELD - OU - BOTH EYES: ICD-10-PCS | Mod: S$GLB,,, | Performed by: OPHTHALMOLOGY

## 2023-12-14 PROCEDURE — 99999 PR PBB SHADOW E&M-EST. PATIENT-LVL III: CPT | Mod: PBBFAC,,, | Performed by: OPHTHALMOLOGY

## 2023-12-14 PROCEDURE — 99999 PR PBB SHADOW E&M-EST. PATIENT-LVL III: ICD-10-PCS | Mod: PBBFAC,,, | Performed by: OPHTHALMOLOGY

## 2023-12-14 PROCEDURE — 92014 COMPRE OPH EXAM EST PT 1/>: CPT | Mod: S$GLB,,, | Performed by: OPHTHALMOLOGY

## 2023-12-14 PROCEDURE — 1160F RVW MEDS BY RX/DR IN RCRD: CPT | Mod: CPTII,S$GLB,, | Performed by: OPHTHALMOLOGY

## 2023-12-14 RX ORDER — KETOROLAC TROMETHAMINE 5 MG/ML
SOLUTION OPHTHALMIC
Qty: 5 ML | Refills: 0 | Status: SHIPPED | OUTPATIENT
Start: 2023-12-14

## 2023-12-14 RX ORDER — DEXTROAMPHETAMINE SACCHARATE, AMPHETAMINE ASPARTATE, DEXTROAMPHETAMINE SULFATE AND AMPHETAMINE SULFATE 5; 5; 5; 5 MG/1; MG/1; MG/1; MG/1
1 TABLET ORAL EVERY MORNING
COMMUNITY
Start: 2023-10-30

## 2023-12-14 NOTE — PROGRESS NOTES
SUBJECTIVE  Rocky Carlos Esquivel is 63 y.o. male  Corrected distance visual acuity was 20/20 in the right eye and 20/20 -1 in the left eye.   Chief Complaint   Patient presents with    Glaucoma Suspect     4 month follow up with HVF, GOCT and Dilation. No pain or irritation. Compliant with gtts.          HPI     Glaucoma Suspect     Additional comments: 4 month follow up with HVF, GOCT and Dilation. No   pain or irritation. Compliant with gtts.           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 Moderate Pseudoexfoliation Glaucoma OS (Tmax 18/32) goal= 17  SLT 07/12/23 OS (24-17)  Mild COAG OD vs suspect  (no Fhx Dx'd x 2021 by Aminta)    Latanoprost qhs OU  Timolol qam OS             Last edited by Ye Morel on 12/14/2023  8:38 AM.         Assessment /Plan :  1. Pseudoexfoliation (PXF) glaucoma, moderate stage left eye Doing well, intraocular pressure (IOP) within acceptable range relative to target IOP and no evidence of progression. Continue current treatment. Reviewed importance of continued compliance with treatment and follow up.      Patient instructed to continue using the following glaucoma medication as follows:  Latanoprost one drop in each eye nightly and Timolol one drop in the left eye daily       2. Primary open angle glaucoma (POAG) of right eye, mild stage Intraocular pressure (IOP) not within acceptable range relative to target IOP with risk of irreversible visual loss. Better IOP control is recommended. Treatment options may include change or additional medications, Selective Laser Trabeculoplasty (SLT laser), and/or incisional glaucoma surgery. Reviewed importance of continued compliance with treatment and follow up.     Patient chooses schedule SLT  OD     3.      Epiretinal membrane left eye - monitor for now    Return for the SLT OD

## 2023-12-18 ENCOUNTER — OFFICE VISIT (OUTPATIENT)
Dept: HEMATOLOGY/ONCOLOGY | Facility: CLINIC | Age: 63
End: 2023-12-18
Payer: COMMERCIAL

## 2023-12-18 VITALS
OXYGEN SATURATION: 98 % | TEMPERATURE: 97 F | DIASTOLIC BLOOD PRESSURE: 85 MMHG | HEART RATE: 58 BPM | SYSTOLIC BLOOD PRESSURE: 130 MMHG | HEIGHT: 74 IN | BODY MASS INDEX: 30.27 KG/M2 | WEIGHT: 235.88 LBS

## 2023-12-18 DIAGNOSIS — Z79.899 IMMUNODEFICIENCY DUE TO CHEMOTHERAPY: ICD-10-CM

## 2023-12-18 DIAGNOSIS — C91.10 CLL (CHRONIC LYMPHOCYTIC LEUKEMIA): Primary | ICD-10-CM

## 2023-12-18 DIAGNOSIS — T45.1X5A IMMUNODEFICIENCY DUE TO CHEMOTHERAPY: ICD-10-CM

## 2023-12-18 DIAGNOSIS — D84.821 IMMUNODEFICIENCY DUE TO CHEMOTHERAPY: ICD-10-CM

## 2023-12-18 PROCEDURE — 3079F DIAST BP 80-89 MM HG: CPT | Mod: CPTII,S$GLB,, | Performed by: INTERNAL MEDICINE

## 2023-12-18 PROCEDURE — 1160F RVW MEDS BY RX/DR IN RCRD: CPT | Mod: CPTII,S$GLB,, | Performed by: INTERNAL MEDICINE

## 2023-12-18 PROCEDURE — 99214 PR OFFICE/OUTPT VISIT, EST, LEVL IV, 30-39 MIN: ICD-10-PCS | Mod: S$GLB,,, | Performed by: INTERNAL MEDICINE

## 2023-12-18 PROCEDURE — 1159F PR MEDICATION LIST DOCUMENTED IN MEDICAL RECORD: ICD-10-PCS | Mod: CPTII,S$GLB,, | Performed by: INTERNAL MEDICINE

## 2023-12-18 PROCEDURE — 3008F BODY MASS INDEX DOCD: CPT | Mod: CPTII,S$GLB,, | Performed by: INTERNAL MEDICINE

## 2023-12-18 PROCEDURE — 1159F MED LIST DOCD IN RCRD: CPT | Mod: CPTII,S$GLB,, | Performed by: INTERNAL MEDICINE

## 2023-12-18 PROCEDURE — 99999 PR PBB SHADOW E&M-EST. PATIENT-LVL IV: ICD-10-PCS | Mod: PBBFAC,,, | Performed by: INTERNAL MEDICINE

## 2023-12-18 PROCEDURE — 3079F PR MOST RECENT DIASTOLIC BLOOD PRESSURE 80-89 MM HG: ICD-10-PCS | Mod: CPTII,S$GLB,, | Performed by: INTERNAL MEDICINE

## 2023-12-18 PROCEDURE — 1160F PR REVIEW ALL MEDS BY PRESCRIBER/CLIN PHARMACIST DOCUMENTED: ICD-10-PCS | Mod: CPTII,S$GLB,, | Performed by: INTERNAL MEDICINE

## 2023-12-18 PROCEDURE — 3075F SYST BP GE 130 - 139MM HG: CPT | Mod: CPTII,S$GLB,, | Performed by: INTERNAL MEDICINE

## 2023-12-18 PROCEDURE — 3008F PR BODY MASS INDEX (BMI) DOCUMENTED: ICD-10-PCS | Mod: CPTII,S$GLB,, | Performed by: INTERNAL MEDICINE

## 2023-12-18 PROCEDURE — 99214 OFFICE O/P EST MOD 30 MIN: CPT | Mod: S$GLB,,, | Performed by: INTERNAL MEDICINE

## 2023-12-18 PROCEDURE — 99999 PR PBB SHADOW E&M-EST. PATIENT-LVL IV: CPT | Mod: PBBFAC,,, | Performed by: INTERNAL MEDICINE

## 2023-12-18 PROCEDURE — 3075F PR MOST RECENT SYSTOLIC BLOOD PRESS GE 130-139MM HG: ICD-10-PCS | Mod: CPTII,S$GLB,, | Performed by: INTERNAL MEDICINE

## 2023-12-18 NOTE — PROGRESS NOTES
Subjective:       Patient ID: Rocky Esquivel is a 63 y.o. male.    Chief Complaint: Results and Leukemia    HPI:  63-year-old male continues on ibrutinib 280 mg recently seen and evaluated in 2023 at [facility] and concur with dose reduction.  Patient remains in remission doing well ECOG status 0    Past Medical History:   Diagnosis Date    Allergy     as a child    Anxiety     CLL (chronic lymphocytic leukemia) 2018    Therapy     marital therapy     Family History   Problem Relation Age of Onset    Anxiety disorder Mother     Colon cancer Father     No Known Problems Sister     No Known Problems Brother     No Known Problems Maternal Aunt     No Known Problems Maternal Uncle     No Known Problems Paternal Aunt     No Known Problems Paternal Uncle     Liver cancer Paternal Grandmother     Schizophrenia Maternal Grandmother     Bipolar disorder Daughter      Social History     Socioeconomic History    Marital status:      Spouse name: Heena     Number of children: 4   Tobacco Use    Smoking status: Former     Current packs/day: 0.00     Types: Cigarettes     Quit date:      Years since quittin.9    Smokeless tobacco: Never   Substance and Sexual Activity    Alcohol use: Yes     Alcohol/week: 5.0 standard drinks of alcohol     Types: 3 Cans of beer, 2 Shots of liquor per week    Drug use: No    Sexual activity: Yes     Partners: Female     Birth control/protection: See Surgical Hx   Social History Narrative     ([place], 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); 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, orthodontist     Past Surgical History:   Procedure Laterality Date    COLONOSCOPY      2017    NASAL SEPTUM SURGERY  1988    TONSILLECTOMY      1972 approx    VASECTOMY         Labs:  Lab Results   Component Value Date    WBC 4.49 2023    HGB 15.6 2023    HCT 46.8 2023    MCV 89 2023     2023     BMP  Lab Results   Component Value Date     2023    K 4.6 2023      "12/14/2023    CO2 26 12/14/2023    BUN 23 12/14/2023    CREATININE 1.2 12/14/2023    CALCIUM 8.8 12/14/2023    ANIONGAP 10 12/14/2023    ESTGFRAFRICA >60 09/28/2021    EGFRNONAA >60 09/28/2021     Lab Results   Component Value Date    ALT 25 12/14/2023    AST 20 12/14/2023    ALKPHOS 41 (L) 12/14/2023    BILITOT 0.7 12/14/2023       Lab Results   Component Value Date    IRON 249 (H) 11/19/2020    TIBC 296 11/19/2020    FERRITIN 285 11/19/2020     No results found for: "CLVTMNHA16"  No results found for: "FOLATE"  Lab Results   Component Value Date    TSH 1.669 11/18/2020         Review of Systems   Constitutional:  Negative for activity change, appetite change, chills, diaphoresis, fatigue, fever and unexpected weight change.   HENT:  Negative for congestion, dental problem, drooling, ear discharge, ear pain, facial swelling, hearing loss, mouth sores, nosebleeds, postnasal drip, rhinorrhea, sinus pressure, sneezing, sore throat, tinnitus, trouble swallowing and voice change.    Eyes:  Negative for photophobia, pain, discharge, redness, itching and visual disturbance.   Respiratory:  Negative for apnea, cough, choking, chest tightness, shortness of breath, wheezing and stridor.    Cardiovascular:  Negative for chest pain, palpitations and leg swelling.   Gastrointestinal:  Negative for abdominal distention, abdominal pain, anal bleeding, blood in stool, constipation, diarrhea, nausea, rectal pain and vomiting.   Endocrine: Negative for cold intolerance, heat intolerance, polydipsia, polyphagia and polyuria.   Genitourinary:  Negative for decreased urine volume, difficulty urinating, dysuria, enuresis, flank pain, frequency, genital sores, hematuria, penile discharge, penile pain, penile swelling, scrotal swelling, testicular pain and urgency.   Musculoskeletal:  Negative for arthralgias, back pain, gait problem, joint swelling, myalgias, neck pain and neck stiffness.   Skin:  Negative for color change, pallor, rash " and wound.   Allergic/Immunologic: Negative for environmental allergies, food allergies and immunocompromised state.   Neurological:  Negative for dizziness, tremors, seizures, syncope, facial asymmetry, speech difficulty, weakness, light-headedness, numbness and headaches.   Hematological:  Negative for adenopathy. Does not bruise/bleed easily.   Psychiatric/Behavioral:  Negative for agitation, behavioral problems, confusion, decreased concentration, dysphoric mood, hallucinations, self-injury, sleep disturbance and suicidal ideas. The patient is not nervous/anxious and is not hyperactive.        Objective:      Physical Exam  Vitals reviewed.   Constitutional:       General: He is not in acute distress.     Appearance: He is well-developed. He is not diaphoretic.   HENT:      Head: Normocephalic.      Right Ear: External ear normal.      Left Ear: External ear normal.      Nose: Nose normal.      Right Sinus: No maxillary sinus tenderness or frontal sinus tenderness.      Left Sinus: No maxillary sinus tenderness or frontal sinus tenderness.      Mouth/Throat:      Pharynx: No oropharyngeal exudate.   Eyes:      General: Lids are normal. No scleral icterus.        Right eye: No discharge.         Left eye: No discharge.      Extraocular Movements:      Right eye: Normal extraocular motion.      Left eye: Normal extraocular motion.      Conjunctiva/sclera:      Right eye: Right conjunctiva is not injected. No hemorrhage.     Left eye: Left conjunctiva is not injected. No hemorrhage.     Pupils: Pupils are equal, round, and reactive to light.   Neck:      Thyroid: No thyromegaly.      Vascular: No JVD.      Trachea: No tracheal deviation.   Cardiovascular:      Rate and Rhythm: Normal rate.   Pulmonary:      Effort: Pulmonary effort is normal. No respiratory distress.      Breath sounds: No stridor.   Abdominal:      General: Bowel sounds are normal.      Palpations: Abdomen is soft. There is no hepatomegaly,  splenomegaly or mass.      Tenderness: There is no abdominal tenderness.   Musculoskeletal:         General: No tenderness. Normal range of motion.      Cervical back: Normal range of motion and neck supple.   Lymphadenopathy:      Head:      Right side of head: No posterior auricular or occipital adenopathy.      Left side of head: No posterior auricular or occipital adenopathy.      Cervical: No cervical adenopathy.      Right cervical: No superficial, deep or posterior cervical adenopathy.     Left cervical: No superficial, deep or posterior cervical adenopathy.      Upper Body:      Right upper body: No supraclavicular adenopathy.      Left upper body: No supraclavicular adenopathy.   Skin:     General: Skin is dry.      Findings: No erythema or rash.      Nails: There is no clubbing.   Neurological:      Mental Status: He is alert and oriented to person, place, and time.      Cranial Nerves: No cranial nerve deficit.      Coordination: Coordination normal.   Psychiatric:         Behavior: Behavior normal.         Thought Content: Thought content normal.         Judgment: Judgment normal.             Assessment:      1. CLL (chronic lymphocytic leukemia)    2. Immunodeficiency due to chemotherapy           Med Onc Chart Routing      Follow up with physician . Return to clinic in 1 year with standing labs CBC CMP LDH and haptoglobin   Follow up with [facility]    Infusion scheduling note    Injection scheduling note    Labs   Scheduling:  Preferred lab:  Lab interval:  CBC CMP LDH and haptoglobin Q 4 months   Imaging    Pharmacy appointment    Other referrals                   Plan:      remains remission reviewed note from [facility] in August 2023.  Demonstrating age matched control life expectancy if patient is in remission with CLL continue with current dose 280 mg per day may dose reduce as per recommendations [facility].  Reviewed follow-up in terms of general medical care through primary physician        Shakeel  J José Manuel Lutz MD FACP

## 2024-01-04 ENCOUNTER — PATIENT MESSAGE (OUTPATIENT)
Dept: OPHTHALMOLOGY | Facility: CLINIC | Age: 64
End: 2024-01-04
Payer: COMMERCIAL

## 2024-01-05 RX ORDER — LATANOPROST 50 UG/ML
1 SOLUTION/ DROPS OPHTHALMIC NIGHTLY
Qty: 2.5 ML | Refills: 3 | Status: SHIPPED | OUTPATIENT
Start: 2024-01-05

## 2024-01-24 ENCOUNTER — TELEPHONE (OUTPATIENT)
Dept: OPHTHALMOLOGY | Facility: CLINIC | Age: 64
End: 2024-01-24
Payer: COMMERCIAL

## 2024-02-28 ENCOUNTER — OUTSIDE PLACE OF SERVICE (OUTPATIENT)
Dept: OPHTHALMOLOGY | Facility: CLINIC | Age: 64
End: 2024-02-28
Payer: COMMERCIAL

## 2024-02-28 PROCEDURE — 65855 TRABECULOPLASTY LASER SURG: CPT | Mod: RT,,, | Performed by: OPHTHALMOLOGY

## 2024-03-14 ENCOUNTER — OFFICE VISIT (OUTPATIENT)
Dept: OPHTHALMOLOGY | Facility: CLINIC | Age: 64
End: 2024-03-14
Payer: COMMERCIAL

## 2024-03-14 DIAGNOSIS — H40.1492 PSEUDOEXFOLIATION (PXF) GLAUCOMA, MODERATE STAGE: Primary | ICD-10-CM

## 2024-03-14 DIAGNOSIS — H52.7 REFRACTIVE ERROR: ICD-10-CM

## 2024-03-14 DIAGNOSIS — H40.1111 PRIMARY OPEN ANGLE GLAUCOMA (POAG) OF RIGHT EYE, MILD STAGE: ICD-10-CM

## 2024-03-14 PROCEDURE — 92015 DETERMINE REFRACTIVE STATE: CPT | Mod: S$GLB,,, | Performed by: OPHTHALMOLOGY

## 2024-03-14 PROCEDURE — 99999 PR PBB SHADOW E&M-EST. PATIENT-LVL III: CPT | Mod: PBBFAC,,, | Performed by: OPHTHALMOLOGY

## 2024-03-14 PROCEDURE — 1160F RVW MEDS BY RX/DR IN RCRD: CPT | Mod: CPTII,S$GLB,, | Performed by: OPHTHALMOLOGY

## 2024-03-14 PROCEDURE — 1159F MED LIST DOCD IN RCRD: CPT | Mod: CPTII,S$GLB,, | Performed by: OPHTHALMOLOGY

## 2024-03-14 PROCEDURE — 92012 INTRM OPH EXAM EST PATIENT: CPT | Mod: S$GLB,,, | Performed by: OPHTHALMOLOGY

## 2024-03-14 RX ORDER — NETARSUDIL AND LATANOPROST OPHTHALMIC SOLUTION, 0.02%/0.005% .2; .05 MG/ML; MG/ML
1 SOLUTION/ DROPS OPHTHALMIC; TOPICAL NIGHTLY
Qty: 2.5 ML | Refills: 12
Start: 2024-03-14 | End: 2024-04-10 | Stop reason: SDUPTHER

## 2024-03-14 NOTE — PROGRESS NOTES
SUBJECTIVE  Rocky Esquivel is 63 y.o. male  Corrected distance visual acuity was 20/20 in the right eye and 20/25 +1 in the left eye.   Chief Complaint   Patient presents with    Glaucoma     SLT OD 2/28/24 (22-)    Patient states OD is doing well, no pain or irritation after laser surgery.          HPI     Glaucoma     Additional comments: SLT OD 2/28/24 (22-)    Patient states OD is doing well, no pain or irritation after laser   surgery.           Comments    1. Moderate Pseudoexfoliation Glaucoma OS (Tmax 18/32) goal= 17  SLT OD 2/28/24 (22-)  SLT 07/12/23 OS (24-17)  Mild COAG OD vs suspect  (no Fhx Dx'd x 2021 by Aminta)  2. RE (seeing Dr. Michel 1/19/24 Lasik consult)    Latanoprost qhs OU  Cosopt or Timolol qam OS             Last edited by Yuli Judd, Patient Care Assistant on 3/14/2024 11:40   AM.         Assessment /Plan :  1. Pseudoexfoliation (PXF) glaucoma, moderate stage - nice response to the SLT OD  Doing well, intraocular pressure (IOP) within acceptable range relative to target IOP and no evidence of progression. Continue current treatment. Reviewed importance of continued compliance with treatment and follow up.      Patient instructed to continue using the following glaucoma medication as follows:  Change Latanoprost to Timolol to [place] one drop in each eye every night    Return to clinic in  3 weeks   or as needed.  With IOP Check     2. Primary open angle glaucoma (POAG) of right eye, mild stage    3.      Refractive error - PAL Rx

## 2024-04-04 ENCOUNTER — OFFICE VISIT (OUTPATIENT)
Dept: OPHTHALMOLOGY | Facility: CLINIC | Age: 64
End: 2024-04-04
Payer: COMMERCIAL

## 2024-04-04 DIAGNOSIS — H40.1111 PRIMARY OPEN ANGLE GLAUCOMA (POAG) OF RIGHT EYE, MILD STAGE: ICD-10-CM

## 2024-04-04 DIAGNOSIS — H40.1492 PSEUDOEXFOLIATION (PXF) GLAUCOMA, MODERATE STAGE: Primary | ICD-10-CM

## 2024-04-04 PROCEDURE — 1160F RVW MEDS BY RX/DR IN RCRD: CPT | Mod: CPTII,S$GLB,, | Performed by: OPHTHALMOLOGY

## 2024-04-04 PROCEDURE — 99999 PR PBB SHADOW E&M-EST. PATIENT-LVL III: CPT | Mod: PBBFAC,,, | Performed by: OPHTHALMOLOGY

## 2024-04-04 PROCEDURE — 1159F MED LIST DOCD IN RCRD: CPT | Mod: CPTII,S$GLB,, | Performed by: OPHTHALMOLOGY

## 2024-04-04 PROCEDURE — 92020 GONIOSCOPY: CPT | Mod: S$GLB,,, | Performed by: OPHTHALMOLOGY

## 2024-04-04 PROCEDURE — 92012 INTRM OPH EXAM EST PATIENT: CPT | Mod: S$GLB,,, | Performed by: OPHTHALMOLOGY

## 2024-04-04 RX ORDER — DORZOLAMIDE HYDROCHLORIDE AND TIMOLOL MALEATE 20; 5 MG/ML; MG/ML
1 SOLUTION/ DROPS OPHTHALMIC EVERY 12 HOURS
Qty: 5 ML | Refills: 12 | Status: SHIPPED | OUTPATIENT
Start: 2024-04-04 | End: 2024-04-25 | Stop reason: SDUPTHER

## 2024-04-04 NOTE — PROGRESS NOTES
SUBJECTIVE  Rocky Carlos Esquivel is 64 y.o. male  Corrected distance visual acuity was 20/20 in the right eye and 20/20 -1 in the left eye.   Chief Complaint   Patient presents with    Glaucoma     Pt reports for 3wk IOP. Denies any pain or irritation. Va stable. 100% compliant with gtts.           HPI     Glaucoma     Additional comments: Pt reports for 3wk IOP. Denies any pain or   irritation. Va stable. 100% compliant with gtts.            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 RE (seeing Dr. Michel 1/19/24 Lasik consult)    Rocklatan qhs OU             Last edited by Rocky Morales on 4/4/2024  1:04 PM.         Assessment /Plan :  1. Pseudoexfoliation (PXF) glaucoma, moderate stage, left eye Intraocular pressure (IOP) not within acceptable range relative to target IOP with risk of irreversible visual loss. Better IOP control is recommended. Treatment options may include change or additional medications, Selective Laser Trabeculoplasty (SLT laser), and/or incisional glaucoma surgery. Reviewed importance of continued compliance with treatment and follow up.     Patient chooses  to add Dorzolamide/Timolol one drop in the left eye 2 times a day. Continue Rocklatan one drop in each eye every night    Return to clinic in  3 weeks   or as needed.  With IOP Check     2. Primary open angle glaucoma (POAG) of right eye, mild stage Doing well, intraocular pressure (IOP) within acceptable range relative to target IOP and no evidence of progression. Continue current treatment. Reviewed importance of continued compliance with treatment and follow up.

## 2024-04-09 ENCOUNTER — PATIENT MESSAGE (OUTPATIENT)
Dept: OPHTHALMOLOGY | Facility: CLINIC | Age: 64
End: 2024-04-09
Payer: COMMERCIAL

## 2024-04-09 DIAGNOSIS — H40.1111 PRIMARY OPEN ANGLE GLAUCOMA (POAG) OF RIGHT EYE, MILD STAGE: ICD-10-CM

## 2024-04-09 DIAGNOSIS — H40.1492 PSEUDOEXFOLIATION (PXF) GLAUCOMA, MODERATE STAGE: ICD-10-CM

## 2024-04-10 RX ORDER — LATANOPROST 50 UG/ML
SOLUTION/ DROPS OPHTHALMIC
Qty: 2.5 ML | Refills: 0 | OUTPATIENT
Start: 2024-04-10

## 2024-04-10 RX ORDER — NETARSUDIL AND LATANOPROST OPHTHALMIC SOLUTION, 0.02%/0.005% .2; .05 MG/ML; MG/ML
1 SOLUTION/ DROPS OPHTHALMIC; TOPICAL NIGHTLY
Qty: 2.5 ML | Refills: 12
Start: 2024-04-10 | End: 2024-04-25 | Stop reason: SDUPTHER

## 2024-04-11 ENCOUNTER — OFFICE VISIT (OUTPATIENT)
Dept: URGENT CARE | Facility: CLINIC | Age: 64
End: 2024-04-11
Payer: COMMERCIAL

## 2024-04-11 VITALS
DIASTOLIC BLOOD PRESSURE: 88 MMHG | HEIGHT: 75 IN | BODY MASS INDEX: 27.98 KG/M2 | RESPIRATION RATE: 18 BRPM | SYSTOLIC BLOOD PRESSURE: 136 MMHG | HEART RATE: 55 BPM | OXYGEN SATURATION: 97 % | TEMPERATURE: 98 F | WEIGHT: 225 LBS

## 2024-04-11 DIAGNOSIS — H11.32 SUBCONJUNCTIVAL HEMATOMA, LEFT: ICD-10-CM

## 2024-04-11 DIAGNOSIS — Z01.30 BLOOD PRESSURE CHECK: Primary | ICD-10-CM

## 2024-04-11 PROCEDURE — 99214 OFFICE O/P EST MOD 30 MIN: CPT | Mod: S$GLB,,,

## 2024-04-11 RX ORDER — LATANOPROST 50 UG/ML
SOLUTION/ DROPS OPHTHALMIC
COMMUNITY
Start: 2024-03-22

## 2024-04-11 NOTE — PATIENT INSTRUCTIONS
Please be aware your blood pressure was slightly elevated today. Please make sure to take your blood pressure medications (if applicable), eat a low salt diet, and recheck your blood pressure. An elevated reading is greater than 160/100. If your blood pressure remains elevated and does not improve with medication OR worsening headache, sudden onset of vision changes, slurred speech, facial drooping, weakness, numbness, or tingling of face or extremities, it is advised that you go to the ER immediately. Also, be sure to let your doctor know about the elevated reading.    Keep a log of your blood pressures and follow up with your primary care doctor. If numbers remain elevated, they may want to see you in clinic to discuss changing/adding you your current medication regimen.   Take your BP on the same arm each time and around the same time each day. Sit with both feet on the floor for a full 5 minutes. Your arm should be at the level of your heart when you take your pressure.   Watch the sodium in your diet. Try to stay under 2000 mg per day. Canned foods, pre-packaged/processed foods, and food from restaurants are all usually high in sodium.  Limit caffeine intake to one cup per day if possible.   Avoid over the counter decongestants (decongestants have one of these listed in the active ingredients: phenylephrine, pseudoephedrine).   Try to take 15-20 minutes every evening to do something relaxing (read a book, bath, listen to soothing music, etc). This will help with stress and BP.

## 2024-04-11 NOTE — PROGRESS NOTES
"Subjective:      Patient ID: Rocky Esquivel is a 64 y.o. male.    Vitals:  height is 6' 3" (1.905 m) and weight is 102.1 kg (225 lb). His oral temperature is 98.1 °F (36.7 °C). His blood pressure is 136/88 and his pulse is 55 (abnormal). His respiration is 18 and oxygen saturation is 97%.     Chief Complaint: Hypertension (Blood pressure checked)    Rocky Esquivel is a 64 y.o. male who presents for a BP check. Patient with hx of CLL currently on ibrutinib 280 mg and being treated currently for glaucoma. Associated sxs include L eye bleeding. No trauma. Concerns for elevated BP due to symptoms and due to at home BP machine being broken. Patient denies any fever, chills, SOB, CP, palpitations, abd pain, n/v/d, rash, dizziness, HA, vision changes, or numbness/tingling. He is not on antihypertensives.    Hypertension  This is a new problem. The current episode started today. The problem is unchanged. The problem is uncontrolled. Pertinent negatives include no chest pain, headaches, palpitations or shortness of breath.       Constitution: Negative for chills, sweating and fever.   Cardiovascular:  Negative for chest pain and palpitations.   Eyes:  Positive for eye redness.   Respiratory:  Negative for shortness of breath.    Gastrointestinal:  Negative for abdominal pain, nausea, vomiting and diarrhea.   Neurological:  Negative for dizziness, headaches, numbness and tingling.      Objective:     Physical Exam   Constitutional: He is oriented to person, place, and time. He appears well-developed. He is cooperative.  Non-toxic appearance. He does not appear ill. No distress.   HENT:   Head: Normocephalic and atraumatic.   Ears:   Right Ear: Hearing and external ear normal.   Left Ear: Hearing and external ear normal.   Nose: Nose normal. No mucosal edema or nasal deformity. No epistaxis. Right sinus exhibits no maxillary sinus tenderness and no frontal sinus tenderness. Left sinus exhibits no maxillary sinus " tenderness and no frontal sinus tenderness.   Mouth/Throat: Uvula is midline, oropharynx is clear and moist and mucous membranes are normal. Mucous membranes are moist. No trismus in the jaw. Normal dentition. No uvula swelling. No oropharyngeal exudate, posterior oropharyngeal edema or posterior oropharyngeal erythema.   Eyes: Lids are normal. Pupils are equal, round, and reactive to light. Right eye exhibits no discharge. Left eye exhibits no discharge. Left conjunctiva has a hemorrhage. No scleral icterus. Right eye exhibits no nystagmus. Left eye exhibits no nystagmus. Extraocular movement intact vision grossly intact gaze aligned appropriately      Comments: L subconjunctival hemorrhage at the 5 o clock position. Does not cross the limbus. No discharge. No FB appreciated. PERRLA. EOM intact.    Neck: Trachea normal and phonation normal. Neck supple. No edema present. No erythema present. No neck rigidity present.   Cardiovascular: Normal rate, regular rhythm, normal heart sounds and normal pulses.   Pulmonary/Chest: Effort normal and breath sounds normal. No stridor. No respiratory distress. He has no decreased breath sounds. He has no wheezes. He has no rhonchi. He has no rales.   Abdominal: Normal appearance.   Musculoskeletal: Normal range of motion.         General: No deformity. Normal range of motion.   Neurological: He is alert and oriented to person, place, and time. He exhibits normal muscle tone. Coordination normal.   Skin: Skin is warm, dry, intact, not diaphoretic and not pale.   Psychiatric: His speech is normal and behavior is normal. Judgment and thought content normal.   Nursing note and vitals reviewed.      Assessment:     1. Blood pressure check    2. Subconjunctival hematoma, left        Plan:       Blood pressure check    Subconjunctival hematoma, left      VSS. /88 in clinic today.  Chart reviewed. Prior BPs -  on 12/18/2023 130/85, on 10/30/2023 142/80, 08/24/2023 138/88.  BP today  consistent with prior. Patient is asymptomatic.  Non traumatic subconjunctival hemorrhage. May be 2/2 transient increase in ICP due to hypertension, however, suspicion low. Considered 2/2 medication side effects or strenuous exercise.  Recommend patient maintain lifestyle modifications.  RTC as needed.  ER precautions given such as SOB, CP, palpitations, HA, blurred vision, slurred speech, or numbness/tingling.   Patient exits exam room in NAD.

## 2024-04-25 ENCOUNTER — OFFICE VISIT (OUTPATIENT)
Dept: OPHTHALMOLOGY | Facility: CLINIC | Age: 64
End: 2024-04-25
Payer: COMMERCIAL

## 2024-04-25 DIAGNOSIS — H43.391 VITREOUS FLOATERS OF RIGHT EYE: ICD-10-CM

## 2024-04-25 DIAGNOSIS — H40.1492 PSEUDOEXFOLIATION (PXF) GLAUCOMA, MODERATE STAGE: Primary | ICD-10-CM

## 2024-04-25 DIAGNOSIS — H40.1111 PRIMARY OPEN ANGLE GLAUCOMA (POAG) OF RIGHT EYE, MILD STAGE: ICD-10-CM

## 2024-04-25 PROCEDURE — 92250 FUNDUS PHOTOGRAPHY W/I&R: CPT | Mod: S$GLB,,, | Performed by: OPHTHALMOLOGY

## 2024-04-25 PROCEDURE — 99214 OFFICE O/P EST MOD 30 MIN: CPT | Mod: S$GLB,,, | Performed by: OPHTHALMOLOGY

## 2024-04-25 PROCEDURE — 99999 PR PBB SHADOW E&M-EST. PATIENT-LVL III: CPT | Mod: PBBFAC,,, | Performed by: OPHTHALMOLOGY

## 2024-04-25 PROCEDURE — 1159F MED LIST DOCD IN RCRD: CPT | Mod: CPTII,S$GLB,, | Performed by: OPHTHALMOLOGY

## 2024-04-25 PROCEDURE — 1160F RVW MEDS BY RX/DR IN RCRD: CPT | Mod: CPTII,S$GLB,, | Performed by: OPHTHALMOLOGY

## 2024-04-25 RX ORDER — NETARSUDIL AND LATANOPROST OPHTHALMIC SOLUTION, 0.02%/0.005% .2; .05 MG/ML; MG/ML
1 SOLUTION/ DROPS OPHTHALMIC; TOPICAL NIGHTLY
Qty: 2.5 ML | Refills: 12 | Status: SHIPPED | OUTPATIENT
Start: 2024-04-25

## 2024-04-25 RX ORDER — DORZOLAMIDE HYDROCHLORIDE AND TIMOLOL MALEATE 20; 5 MG/ML; MG/ML
1 SOLUTION/ DROPS OPHTHALMIC EVERY 12 HOURS
Qty: 5 ML | Refills: 12 | Status: SHIPPED | OUTPATIENT
Start: 2024-04-25

## 2024-04-25 NOTE — PROGRESS NOTES
SUBJECTIVE  Rocky Carlos Esquivel is 64 y.o. male  Corrected distance visual acuity was 20/20 in the right eye and 20/20 in the left eye.   Chief Complaint   Patient presents with    Glaucoma     Pt reports for 3wk IOP check. Denies any pain or irritation. Va stable. 100% compliant with gtts.           HPI     Glaucoma     Additional comments: Pt reports for 3wk IOP check. Denies any pain or   irritation. Va stable. 100% compliant with gtts.            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 RE (seeing Dr. Michel 1/19/24 Lasik consult)    Rocklatan qhs OU  Dorzolamide/Timolol BID OS             Last edited by Rocky Morales on 4/25/2024 12:56 PM.         Assessment /Plan :  1. Pseudoexfoliation (PXF) glaucoma, moderate stage Doing well, intraocular pressure (IOP) within acceptable range relative to target IOP and no evidence of progression. Continue current treatment. Reviewed importance of continued compliance with treatment and follow up.      Patient instructed to continue using the following glaucoma medication as follows:  Rocklatan one drop in each eye nightly and Dorzolamide/Timolol (Cosopt) one drop in the left eye every 12 hours    Return to clinic in 3-4 months  or as needed.  With IOP Check and GOCT     2. Primary open angle glaucoma (POAG) of right eye, mild stage    3.      Vitreous floaters, right eye - discussed reassurance and precautions

## 2024-06-19 NOTE — NURSING
Lab obtained from left arm, 2 patient identifiers obtained, labeled and sent to lab. Dressing changed via sterile technique. Site without redness, swelling or drainage noted.   
negative...

## 2024-08-15 ENCOUNTER — OFFICE VISIT (OUTPATIENT)
Dept: OPHTHALMOLOGY | Facility: CLINIC | Age: 64
End: 2024-08-15
Payer: COMMERCIAL

## 2024-08-15 DIAGNOSIS — H40.1492 PSEUDOEXFOLIATION (PXF) GLAUCOMA, MODERATE STAGE: Primary | ICD-10-CM

## 2024-08-15 DIAGNOSIS — H40.1111 PRIMARY OPEN ANGLE GLAUCOMA (POAG) OF RIGHT EYE, MILD STAGE: ICD-10-CM

## 2024-08-15 PROCEDURE — 99999 PR PBB SHADOW E&M-EST. PATIENT-LVL III: CPT | Mod: PBBFAC,,, | Performed by: OPHTHALMOLOGY

## 2024-08-15 NOTE — PROGRESS NOTES
SUBJECTIVE  Rocky Carlos Esquivel is 64 y.o. male  Corrected distance visual acuity was 20/20 -2 in the right eye and 20/20 -2 in the left eye.   Chief Complaint   Patient presents with    Glaucoma     3-4 month IOP and GOCT. VA is small words is more fuzzy. No pain or irritation. Compliant with rocklatan but Cosopt forgot and was doing it once a day for the left eye.          HPI     Glaucoma     Additional comments: 3-4 month IOP and GOCT. VA is small words is more   fuzzy. No pain or irritation. Compliant with rocklatan but Cosopt forgot   and was doing it once a day for the left eye.           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 RE (seeing Dr. Michel 1/19/24 Lasik consult)    Rocklatan qhs OU  Dorzolamide/Timolol BID OS             Last edited by Ye Morel on 8/15/2024 12:50 PM.         Assessment /Plan :  1. Pseudoexfoliation (PXF) glaucoma, moderate stage Doing well, intraocular pressure (IOP) within acceptable range relative to target IOP and no evidence of progression. Continue current treatment. Reviewed importance of continued compliance with treatment and follow up.      Patient instructed to continue using the following glaucoma medication as follows:  Rocklatan one drop in each eye nightly and Dorzolamide/Timolol (Cosopt) one drop in the left eye every 12 hours    Return to clinic in 3-4 months  or as needed.  With Dilation and HVF 24-2     2. Primary open angle glaucoma (POAG) of right eye, mild stage

## 2024-11-03 DIAGNOSIS — H40.1492 PSEUDOEXFOLIATION (PXF) GLAUCOMA, MODERATE STAGE: ICD-10-CM

## 2024-11-03 DIAGNOSIS — H40.1111 PRIMARY OPEN ANGLE GLAUCOMA (POAG) OF RIGHT EYE, MILD STAGE: ICD-10-CM

## 2024-11-04 RX ORDER — NETARSUDIL AND LATANOPROST OPHTHALMIC SOLUTION, 0.02%/0.005% .2; .05 MG/ML; MG/ML
1 SOLUTION/ DROPS OPHTHALMIC; TOPICAL NIGHTLY
Qty: 2.5 ML | Refills: 12 | Status: SHIPPED | OUTPATIENT
Start: 2024-11-04

## 2024-11-14 ENCOUNTER — OFFICE VISIT (OUTPATIENT)
Dept: OPHTHALMOLOGY | Facility: CLINIC | Age: 64
End: 2024-11-14
Payer: COMMERCIAL

## 2024-11-14 DIAGNOSIS — H04.129 DRY EYE: ICD-10-CM

## 2024-11-14 DIAGNOSIS — H35.372 EPIRETINAL MEMBRANE (ERM) OF LEFT EYE: ICD-10-CM

## 2024-11-14 DIAGNOSIS — H40.1492 PSEUDOEXFOLIATION (PXF) GLAUCOMA, MODERATE STAGE: Primary | ICD-10-CM

## 2024-11-14 DIAGNOSIS — H40.1111 PRIMARY OPEN ANGLE GLAUCOMA (POAG) OF RIGHT EYE, MILD STAGE: ICD-10-CM

## 2024-11-14 PROCEDURE — 99999 PR PBB SHADOW E&M-EST. PATIENT-LVL II: CPT | Mod: PBBFAC,,, | Performed by: OPHTHALMOLOGY

## 2024-11-14 NOTE — PROGRESS NOTES
SUBJECTIVE  Rocky Carlos Esquivel is 64 y.o. male  Uncorrected distance visual acuity was 20/20 in the right eye and 20/20 in the left eye.   Chief Complaint   Patient presents with    Glaucoma     Pt reports for 3-4month glaucoma with Dilation and HVF 24-2. No pain or discomfort. Using gtts. Vision has been a little fuzzy.           HPI     Glaucoma     Additional comments: Pt reports for 3-4month glaucoma with Dilation and   HVF 24-2. No pain or discomfort. Using gtts. Vision has been a little   fuzzy.            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 RE (seeing Dr. Michel 1/19/24 Lasik consult)    Rocklatan qhs OU  Dorzolamide/Timolol BID OS          Last edited by Sheyla Mcgarry on 11/14/2024  1:26 PM.         Assessment /Plan :  1. Pseudoexfoliation (PXF) glaucoma, moderate stage Doing well, intraocular pressure (IOP) within acceptable range relative to target IOP and no evidence of progression. Continue current treatment. Reviewed importance of continued compliance with treatment and follow up.      Patient instructed to continue using the following glaucoma medication as follows:  Rocklatan one drop in each eye nightly and Dorzolamide/Timolol (Cosopt) one drop in the left eye every 12 hours    Return to clinic in 4 months with Dr. Boo or as needed.  With IOP Check and GOCT    Patient interested in trying monovision CL. Will refer to Dr. Perez for CL fit.      2. Primary open angle glaucoma (POAG) of right eye, mild stage    3. Epiretinal membrane (ERM) of left eye  -- Condition stable, no therapeutic change required. Monitoring routinely.     4. Dry eye  -- Condition stable, no  therapeutic change required. Monitoring routinely.

## 2024-11-21 ENCOUNTER — OFFICE VISIT (OUTPATIENT)
Dept: OPHTHALMOLOGY | Facility: CLINIC | Age: 64
End: 2024-11-21
Payer: COMMERCIAL

## 2024-11-21 DIAGNOSIS — H52.13 MYOPIA WITH ASTIGMATISM AND PRESBYOPIA, BILATERAL: Primary | ICD-10-CM

## 2024-11-21 DIAGNOSIS — H52.203 MYOPIA WITH ASTIGMATISM AND PRESBYOPIA, BILATERAL: Primary | ICD-10-CM

## 2024-11-21 DIAGNOSIS — H52.4 MYOPIA WITH ASTIGMATISM AND PRESBYOPIA, BILATERAL: Primary | ICD-10-CM

## 2024-11-21 DIAGNOSIS — H40.1111 PRIMARY OPEN ANGLE GLAUCOMA (POAG) OF RIGHT EYE, MILD STAGE: ICD-10-CM

## 2024-11-21 PROCEDURE — 99999 PR PBB SHADOW E&M-EST. PATIENT-LVL II: CPT | Mod: PBBFAC,,, | Performed by: OPTOMETRIST

## 2024-11-21 PROCEDURE — 99499 UNLISTED E&M SERVICE: CPT | Mod: ,,, | Performed by: OPTOMETRIST

## 2024-11-21 NOTE — PROGRESS NOTES
HPI     Contact Lens Fit            Comments: Pt reports for CTL fit. Pt states vision is a little fuzzy. No   pain or irritation.          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 RE (seeing Dr. Michel 1/19/24 Lasik consult)  3. ERM OS    Rocklatan qhs OU  Dorzolamide/Timolol BID OS             Last edited by So Nielson on 11/21/2024  8:50 AM.            Assessment /Plan     For exam results, see Encounter Report.    Myopia with astigmatism and presbyopia, bilateral      Monovision CTL fitted and dispensed above. CTL hygiene reviewed. RTC 1 week for CTL f/u, sooner if any changes to vision or worsening symptoms.     Primary open angle glaucoma (POAG) of right eye, mild stage  Continue per Dr HERNANDEZ/Dr DOSHI.     RTC 1 week for CTL f/u, sooner if any changes to vision or worsening symptoms.

## 2024-11-24 ENCOUNTER — PATIENT MESSAGE (OUTPATIENT)
Dept: OPHTHALMOLOGY | Facility: CLINIC | Age: 64
End: 2024-11-24
Payer: COMMERCIAL

## 2024-11-26 ENCOUNTER — OFFICE VISIT (OUTPATIENT)
Dept: OPHTHALMOLOGY | Facility: CLINIC | Age: 64
End: 2024-11-26
Payer: COMMERCIAL

## 2024-11-26 DIAGNOSIS — H52.13 MYOPIA WITH ASTIGMATISM AND PRESBYOPIA, BILATERAL: Primary | ICD-10-CM

## 2024-11-26 DIAGNOSIS — H40.1111 PRIMARY OPEN ANGLE GLAUCOMA (POAG) OF RIGHT EYE, MILD STAGE: ICD-10-CM

## 2024-11-26 DIAGNOSIS — H52.4 MYOPIA WITH ASTIGMATISM AND PRESBYOPIA, BILATERAL: Primary | ICD-10-CM

## 2024-11-26 DIAGNOSIS — H52.203 MYOPIA WITH ASTIGMATISM AND PRESBYOPIA, BILATERAL: Primary | ICD-10-CM

## 2024-11-26 PROCEDURE — 99999 PR PBB SHADOW E&M-EST. PATIENT-LVL II: CPT | Mod: PBBFAC,,, | Performed by: OPTOMETRIST

## 2024-11-26 PROCEDURE — 99499 UNLISTED E&M SERVICE: CPT | Mod: ,,, | Performed by: OPTOMETRIST

## 2024-11-26 NOTE — PROGRESS NOTES
HPI     Contact Lens Follow Up            Comments: Pt c/o distance VA not as clear as he would like it to be;   happy with near. No other complaints.           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 RE (seeing Dr. Michel 1/19/24 Lasik consult)  3. ERM OS    Rocklatan qhs OU  Dorzolamide/Timolol BID OS             Last edited by Kaelyn Goldman on 11/26/2024  8:13 AM.            Assessment /Plan     For exam results, see Encounter Report.    1. Myopia with astigmatism and presbyopia, bilateral    Continue monovision Wear OD distance OS near.   Consider OU distance if pt having difficulty with monovision.     2. Primary open angle glaucoma (POAG) of right eye, mild stage  Continue per Dr CPG/MGM.      RTC as scheduled for annual eye exam, sooner if any changes to vision worsening symptoms.

## 2024-12-05 ENCOUNTER — OFFICE VISIT (OUTPATIENT)
Dept: OPHTHALMOLOGY | Facility: CLINIC | Age: 64
End: 2024-12-05
Payer: COMMERCIAL

## 2024-12-05 DIAGNOSIS — H52.203 MYOPIA WITH ASTIGMATISM AND PRESBYOPIA, BILATERAL: Primary | ICD-10-CM

## 2024-12-05 DIAGNOSIS — H52.13 MYOPIA WITH ASTIGMATISM AND PRESBYOPIA, BILATERAL: Primary | ICD-10-CM

## 2024-12-05 DIAGNOSIS — H52.4 MYOPIA WITH ASTIGMATISM AND PRESBYOPIA, BILATERAL: Primary | ICD-10-CM

## 2024-12-05 PROCEDURE — 99499 UNLISTED E&M SERVICE: CPT | Mod: ,,, | Performed by: OPTOMETRIST

## 2024-12-05 PROCEDURE — 99999 PR PBB SHADOW E&M-EST. PATIENT-LVL II: CPT | Mod: PBBFAC,,, | Performed by: OPTOMETRIST

## 2024-12-05 NOTE — PROGRESS NOTES
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 RE (seeing Dr. Michel 1/19/24 Lasik consult)  3. ERM OS    Rocklatan qhs OU  Dorzolamide/Timolol BID O             Last edited by Gisela Gordon, PCT on 12/5/2024 11:18 AM.            Assessment /Plan     For exam results, see Encounter Report.    Myopia with astigmatism and presbyopia, bilateral      Contact Lens Final Rx       Final Contact Lens Rx         Brand Base Curve Diameter Sphere Cylinder Axis    Right Acuvue 1-Day Moist for Astigmatism  8.5 14.3 -3.00 -0.75 160    Left Air Optix Plus Hydraglyde for Astigmatism  8.7 14.5 -4.00 -0.75 150                OU distance CTL, pt shady require OTC readers for near tasks.   RTC 10 days for f/u, sooner if any changes to vision or worsening symptoms.   Contact lens trials fitted in office today. Contact lens hygiene reviewed. Patient able to insert the lenses themselves with minimal difficulty. Patient ok to finalize Contact lens after 1 week of wear. RTC if still having difficulty with CTL trial after 1 week.

## 2024-12-17 ENCOUNTER — OFFICE VISIT (OUTPATIENT)
Dept: OPHTHALMOLOGY | Facility: CLINIC | Age: 64
End: 2024-12-17
Payer: COMMERCIAL

## 2024-12-17 DIAGNOSIS — H52.13 MYOPIA WITH ASTIGMATISM AND PRESBYOPIA, BILATERAL: Primary | ICD-10-CM

## 2024-12-17 DIAGNOSIS — H52.203 MYOPIA WITH ASTIGMATISM AND PRESBYOPIA, BILATERAL: Primary | ICD-10-CM

## 2024-12-17 DIAGNOSIS — H52.4 MYOPIA WITH ASTIGMATISM AND PRESBYOPIA, BILATERAL: Primary | ICD-10-CM

## 2024-12-17 PROCEDURE — 99999 PR PBB SHADOW E&M-EST. PATIENT-LVL I: CPT | Mod: PBBFAC,,, | Performed by: OPTOMETRIST

## 2024-12-17 PROCEDURE — 99499 UNLISTED E&M SERVICE: CPT | Mod: ,,, | Performed by: OPTOMETRIST

## 2024-12-17 PROCEDURE — 92499 UNLISTED OPH SVC/PROCEDURE: CPT | Mod: CSM,,, | Performed by: OPTOMETRIST

## 2024-12-17 NOTE — Clinical Note
Jeffrey Redmond, please order trial #2 for this patient from [facility]. Appointment needed at dispense.  Thanks, DT

## 2024-12-17 NOTE — PROGRESS NOTES
HPI     Contact Lens Follow Up            Comments: Pt reports for CTL f/u. No pain or irritation. Pt states his   eyes only tear when wearing contacts. Vision is better with glasses.          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 RE (seeing Dr. Michel 1/19/24 Lasik consult)  3. ERM OS    Rocklatan qhs OU  Dorzolamide/Timolol BID OS             Last edited by So Nielson on 12/17/2024 11:05 AM.            Assessment /Plan     For exam results, see Encounter Report.    Myopia with astigmatism and presbyopia, bilateral      Contact Lens Final Rx       Final Contact Lens Rx         Brand Base Curve Diameter Sphere Cylinder Axis    Right Biofinity Toric 8.7 14.5 -3.50 -0.75 160    Left Biofinity Toric 8.7 14.5 -4.50 -0.75 150      Expiration Date: 12/17/2025    Replacement: Monthly    Solutions: OptiFree PureMoist    Wearing Schedule: Daily Wear                OU distance CTL fitted today. Will require OTC readers on top for near tasks.   Order trials as directed above. RTC when CTL trials arrive for dispense. Appointment needed.

## 2024-12-27 ENCOUNTER — PATIENT MESSAGE (OUTPATIENT)
Dept: OPTOMETRY | Facility: CLINIC | Age: 64
End: 2024-12-27
Payer: COMMERCIAL

## 2025-01-05 ENCOUNTER — PATIENT MESSAGE (OUTPATIENT)
Dept: OPHTHALMOLOGY | Facility: CLINIC | Age: 65
End: 2025-01-05
Payer: COMMERCIAL

## 2025-01-07 ENCOUNTER — OFFICE VISIT (OUTPATIENT)
Dept: URGENT CARE | Facility: CLINIC | Age: 65
End: 2025-01-07
Payer: COMMERCIAL

## 2025-01-07 VITALS
TEMPERATURE: 98 F | OXYGEN SATURATION: 99 % | BODY MASS INDEX: 29.81 KG/M2 | WEIGHT: 232.25 LBS | RESPIRATION RATE: 16 BRPM | HEART RATE: 70 BPM | HEIGHT: 74 IN | SYSTOLIC BLOOD PRESSURE: 154 MMHG | DIASTOLIC BLOOD PRESSURE: 81 MMHG

## 2025-01-07 DIAGNOSIS — R03.0 ELEVATED BLOOD PRESSURE READING: ICD-10-CM

## 2025-01-07 DIAGNOSIS — J10.1 INFLUENZA A: Primary | ICD-10-CM

## 2025-01-07 DIAGNOSIS — R05.9 COUGH, UNSPECIFIED TYPE: ICD-10-CM

## 2025-01-07 LAB
CTP QC/QA: YES
CTP QC/QA: YES
POC MOLECULAR INFLUENZA A AGN: POSITIVE
POC MOLECULAR INFLUENZA B AGN: NEGATIVE
SARS-COV-2 AG RESP QL IA.RAPID: NEGATIVE

## 2025-01-07 PROCEDURE — 87811 SARS-COV-2 COVID19 W/OPTIC: CPT | Mod: QW,S$GLB,, | Performed by: NURSE PRACTITIONER

## 2025-01-07 PROCEDURE — 87502 INFLUENZA DNA AMP PROBE: CPT | Mod: QW,S$GLB,, | Performed by: NURSE PRACTITIONER

## 2025-01-07 PROCEDURE — 99213 OFFICE O/P EST LOW 20 MIN: CPT | Mod: S$GLB,,, | Performed by: NURSE PRACTITIONER

## 2025-01-07 RX ORDER — BALOXAVIR MARBOXIL 80 MG/1
80 TABLET, FILM COATED ORAL ONCE
Qty: 1 TABLET | Refills: 0 | Status: SHIPPED | OUTPATIENT
Start: 2025-01-07 | End: 2025-01-07

## 2025-01-07 NOTE — PATIENT INSTRUCTIONS
Flu Discharge Instructions  Please follow up with your PCP if there is any uncertainty as it pertains to medications you can take.   You have been diagnosed with Influenza. Which is viral in nature. Influenza may take 5-7 days to run its course.   You are contagious until you are fever free for 24 hours without any antipyretic medications such as tylenol or ibuprofen.   Please drink plenty of fluids.  Please get plenty of rest.    Xofluza, antiviral medication, has been prescribed. This Medication is known to lessen symptoms and shorten duration.Xofluza (baloxavir marboxil) works by stopping the flu virus from replicating in the body. You only need one dose of Xofluza, but you need to take it within 48 hours (2 days) of feeling flu symptoms. Xofluza starts working in about 4 hours, but it may take up to 2 days before your flu symptoms go away. In the meantime, its fine to take most over-the-counter flu medications with Xofluza if you need them.  If you were prescribed a narcotic medication, do not drive or operate heavy equipment or machinery while taking these medications.  If not contraindicated, Take tylenol (acetominophen) for fever, chills or body aches every 4 hours. do not exceed 4000 mg/ day. Avoid tylenol (acetaminophen) if you  have any liver issues.  If not contraindicated, Take Motrin (Ibuprofen) every 4 hours for fever, chills, pain or inflammation. Avoid NSAIDS motrin(ibuprofen), aleve, naproxen ETC if you are taking blood thinners , have a history of GI bleeds or Kidney issues.   Use an antihistmine such as claritin or zyrtec to dry you out. Use pseudoephedrine (behind the counter) to decongest (beware this can raise your blood pressure). Use mucinex (guaifenisin) to break up mucus.     Please arrange follow up with your primary medical clinic as soon as possible. You must understand that you've received an Urgent Care treatment only and that you may be released before all of your medical problems  are known or treated. You, the patient, will arrange for follow up as instructed. If your symptoms worsen or fail to improve you should go to the Emergency Room.

## 2025-01-07 NOTE — PROGRESS NOTES
"Subjective:      Patient ID: Rocky Esquivel is a 64 y.o. male.    Vitals:  height is 6' 2" (1.88 m) and weight is 105.3 kg (232 lb 4.1 oz). His oral temperature is 98.3 °F (36.8 °C). His blood pressure is 154/81 (abnormal) and his pulse is 70. His respiration is 16 and oxygen saturation is 99%.     Chief Complaint: Cough    Rocky Esquivel is a 64 year old male whom presents to urgent care for evaluation of  sinus congestion, cough and body aches for appx 2 days. He was recently exposed to Flu A. He also has been traveling a lot. He has taken Nyquil and Advil with some relief. He rates his body aches 2/10.    Cough  This is a new problem. The current episode started in the past 7 days. The problem has been gradually worsening. The problem occurs every few minutes. The cough is Non-productive. Associated symptoms include chills, myalgias, nasal congestion, postnasal drip, rhinorrhea and a sore throat. Pertinent negatives include no chest pain, ear congestion, ear pain, fever, headaches, heartburn, hemoptysis, rash, shortness of breath, sweats, weight loss or wheezing. The symptoms are aggravated by lying down. Treatments tried: Nyquil, Advil. The treatment provided mild relief. There is no history of asthma, bronchiectasis, bronchitis, COPD, emphysema, environmental allergies or pneumonia.       Constitution: Positive for chills. Negative for fever.   HENT:  Positive for postnasal drip and sore throat. Negative for ear pain.    Cardiovascular:  Negative for chest pain.   Respiratory:  Positive for cough. Negative for bloody sputum, shortness of breath and wheezing.    Gastrointestinal:  Negative for heartburn.   Musculoskeletal:  Positive for muscle ache.   Skin:  Negative for rash.   Allergic/Immunologic: Negative for environmental allergies.   Neurological:  Negative for headaches.      Objective:     Physical Exam   Constitutional: He is oriented to person, place, and time. He appears well-developed. " He is cooperative.   HENT:   Head: Normocephalic and atraumatic.   Ears:   Right Ear: Hearing, tympanic membrane, external ear and ear canal normal.   Left Ear: Hearing, tympanic membrane, external ear and ear canal normal.   Nose: Congestion present. No mucosal edema or nasal deformity. No epistaxis. Right sinus exhibits no maxillary sinus tenderness and no frontal sinus tenderness. Left sinus exhibits no maxillary sinus tenderness and no frontal sinus tenderness.   Mouth/Throat: Uvula is midline, oropharynx is clear and moist and mucous membranes are normal. No trismus in the jaw. Normal dentition. No uvula swelling.   Eyes: Conjunctivae and lids are normal.   Neck: Trachea normal and phonation normal. Neck supple.   Cardiovascular: Normal rate, regular rhythm, normal heart sounds and normal pulses.   Pulmonary/Chest: Effort normal and breath sounds normal.   Abdominal: Normal appearance and bowel sounds are normal. Soft.   Musculoskeletal: Normal range of motion.         General: Normal range of motion.   Neurological: He is alert and oriented to person, place, and time. He exhibits normal muscle tone.   Skin: Skin is warm, dry and intact.   Psychiatric: His speech is normal and behavior is normal. Judgment and thought content normal.   Nursing note and vitals reviewed.    Results for orders placed or performed in visit on 01/07/25   POCT Influenza A/B MOLECULAR    Collection Time: 01/07/25 10:38 AM   Result Value Ref Range    POC Molecular Influenza A Ag Positive (A) Negative    POC Molecular Influenza B Ag Negative Negative     Acceptable Yes    SARS Coronavirus 2 Antigen, POCT Manual Read    Collection Time: 01/07/25 10:43 AM   Result Value Ref Range    SARS Coronavirus 2 Antigen Negative Negative     Acceptable Yes        Assessment:     1. Influenza A    2. Cough, unspecified type    3. Elevated blood pressure reading        Plan:       Influenza A  -     baloxavir marboxiL  (XOFLUZA) 80 mg tablet; Take 1 tablet (80 mg total) by mouth once. for 1 dose  Dispense: 1 tablet; Refill: 0    Cough, unspecified type  -     POCT Influenza A/B MOLECULAR  -     SARS Coronavirus 2 Antigen, POCT Manual Read    Elevated blood pressure reading          Medical Decision Making:   Urgent Care Management:  Previous encounters were independently reviewed. Discussed positive Influenza A results with patient. Patient is within the window for antiviral therapy.  Discussed antiviral therapy options. Patient would like to move forward with xofluza. Discussed possible side effects as it pertains to xofluza. Symptomatic treatment options were discussed. Patient was instructed to get plenty of rest and drink plenty of fluids. Permissive fever and its benefits were discussed. Advised patient to take tylenol (acetominophen) for fever, chills or body aches every 4 hours but not to exceed 4000 mg/ day. Discussed that the patient is contagious for 24-48 hours after starting antiviral therapy or 24 hours after last fever(without antipyretic medications), whichever happens last. Advised patient to  go to the Emergency Department for any concerns or worsening of condition.Patients vitals  are reassuring.  Patient has no questions or concerns at this time and agrees with the discussed plan of care. Patient given handout with discharge instructions. Patient remained stable and was discharged in no acute distress.             Patient Instructions   Flu Discharge Instructions  Please follow up with your PCP if there is any uncertainty as it pertains to medications you can take.   You have been diagnosed with Influenza. Which is viral in nature. Influenza may take 5-7 days to run its course.   You are contagious until you are fever free for 24 hours without any antipyretic medications such as tylenol or ibuprofen.   Please drink plenty of fluids.  Please get plenty of rest.    Xofluza, antiviral medication, has been  prescribed. This Medication is known to lessen symptoms and shorten duration.Xofluza (baloxavir marboxil) works by stopping the flu virus from replicating in the body. You only need one dose of Xofluza, but you need to take it within 48 hours (2 days) of feeling flu symptoms. Xofluza starts working in about 4 hours, but it may take up to 2 days before your flu symptoms go away. In the meantime, its fine to take most over-the-counter flu medications with Xofluza if you need them.  If you were prescribed a narcotic medication, do not drive or operate heavy equipment or machinery while taking these medications.  If not contraindicated, Take tylenol (acetominophen) for fever, chills or body aches every 4 hours. do not exceed 4000 mg/ day. Avoid tylenol (acetaminophen) if you  have any liver issues.  If not contraindicated, Take Motrin (Ibuprofen) every 4 hours for fever, chills, pain or inflammation. Avoid NSAIDS motrin(ibuprofen), aleve, naproxen ETC if you are taking blood thinners , have a history of GI bleeds or Kidney issues.   Use an antihistmine such as claritin or zyrtec to dry you out. Use pseudoephedrine (behind the counter) to decongest (beware this can raise your blood pressure). Use mucinex (guaifenisin) to break up mucus.     Please arrange follow up with your primary medical clinic as soon as possible. You must understand that you've received an Urgent Care treatment only and that you may be released before all of your medical problems are known or treated. You, the patient, will arrange for follow up as instructed. If your symptoms worsen or fail to improve you should go to the Emergency Room.

## 2025-01-21 ENCOUNTER — TELEPHONE (OUTPATIENT)
Dept: OPHTHALMOLOGY | Facility: CLINIC | Age: 65
End: 2025-01-21
Payer: COMMERCIAL

## 2025-02-06 ENCOUNTER — OFFICE VISIT (OUTPATIENT)
Dept: OPHTHALMOLOGY | Facility: CLINIC | Age: 65
End: 2025-02-06
Payer: COMMERCIAL

## 2025-02-06 DIAGNOSIS — H52.203 MYOPIA WITH ASTIGMATISM AND PRESBYOPIA, BILATERAL: Primary | ICD-10-CM

## 2025-02-06 DIAGNOSIS — H52.4 MYOPIA WITH ASTIGMATISM AND PRESBYOPIA, BILATERAL: Primary | ICD-10-CM

## 2025-02-06 DIAGNOSIS — H52.13 MYOPIA WITH ASTIGMATISM AND PRESBYOPIA, BILATERAL: Primary | ICD-10-CM

## 2025-02-06 DIAGNOSIS — H40.1111 PRIMARY OPEN ANGLE GLAUCOMA (POAG) OF RIGHT EYE, MILD STAGE: ICD-10-CM

## 2025-02-06 PROCEDURE — 92499 UNLISTED OPH SVC/PROCEDURE: CPT | Mod: CSM,,, | Performed by: OPTOMETRIST

## 2025-02-06 PROCEDURE — 99999 PR PBB SHADOW E&M-EST. PATIENT-LVL III: CPT | Mod: PBBFAC,,, | Performed by: OPTOMETRIST

## 2025-02-06 PROCEDURE — 99499 UNLISTED E&M SERVICE: CPT | Mod: ,,, | Performed by: OPTOMETRIST

## 2025-02-06 NOTE — PROGRESS NOTES
HPI     Eye Problem            Comments: OU  really red all the time and running water not sure if its   the Rocklatan or Dorz/ Timolol  Has noticed combo drop has been stinging lately               Contact Lens Follow Up            Comments: Here to  contact lens           Comments    Here for pickup contact lens today             Last edited by Yair Recinos on 2/6/2025  9:35 AM.            Assessment /Plan     For exam results, see Encounter Report.    1. Myopia with astigmatism and presbyopia, bilateral  Contact Lens Final Rx       Final Contact Lens Rx         Brand Base Curve Diameter Sphere Cylinder Axis    Right Biofinity Toric 8.7 14.5 -3.50 -0.75 160    Left Biofinity Toric 8.7 14.5 -4.50 -0.75 150      Expiration Date: 2/6/2026    Replacement: Monthly    Solutions: OptiFree PureMoist    Wearing Schedule: Daily Wear                OU distance, discussed OTC readers for near tasks.     Contact lens trials fitted in office today. Contact lens hygiene reviewed. Patient able to insert the lenses themselves with minimal difficulty. Patient ok to finalize Contact lens after 1 week of wear. RTC if still having difficulty with CTL trial after 1 week.     2. Primary open angle glaucoma (POAG) of right eye, mild stage  Continue per Dr. DOSHI.     RTC as scheduled for annual eye exam, sooner if any changes to vision worsening symptoms.

## 2025-02-21 DIAGNOSIS — H40.1492 PSEUDOEXFOLIATION (PXF) GLAUCOMA, MODERATE STAGE: ICD-10-CM

## 2025-02-21 DIAGNOSIS — H40.1111 PRIMARY OPEN ANGLE GLAUCOMA (POAG) OF RIGHT EYE, MILD STAGE: ICD-10-CM

## 2025-02-24 RX ORDER — NETARSUDIL AND LATANOPROST OPHTHALMIC SOLUTION, 0.02%/0.005% .2; .05 MG/ML; MG/ML
1 SOLUTION/ DROPS OPHTHALMIC; TOPICAL NIGHTLY
Qty: 2.5 ML | Refills: 12 | Status: SHIPPED | OUTPATIENT
Start: 2025-02-24

## 2025-03-06 ENCOUNTER — OFFICE VISIT (OUTPATIENT)
Dept: OPHTHALMOLOGY | Facility: CLINIC | Age: 65
End: 2025-03-06
Payer: COMMERCIAL

## 2025-03-06 DIAGNOSIS — H40.1492 PSEUDOEXFOLIATION (PXF) GLAUCOMA, MODERATE STAGE: Primary | ICD-10-CM

## 2025-03-06 DIAGNOSIS — H52.13 MYOPIA WITH ASTIGMATISM AND PRESBYOPIA, BILATERAL: ICD-10-CM

## 2025-03-06 DIAGNOSIS — H52.4 MYOPIA WITH ASTIGMATISM AND PRESBYOPIA, BILATERAL: ICD-10-CM

## 2025-03-06 DIAGNOSIS — H52.203 MYOPIA WITH ASTIGMATISM AND PRESBYOPIA, BILATERAL: ICD-10-CM

## 2025-03-06 DIAGNOSIS — H40.1111 PRIMARY OPEN ANGLE GLAUCOMA (POAG) OF RIGHT EYE, MILD STAGE: ICD-10-CM

## 2025-03-06 PROCEDURE — 99999 PR PBB SHADOW E&M-EST. PATIENT-LVL III: CPT | Mod: PBBFAC,,, | Performed by: OPTOMETRIST

## 2025-03-06 NOTE — PROGRESS NOTES
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 RE (seeing Dr. Michel 1/19/24 Lasik consult)  3. ERM OS    Rocklatan qhs OU  Dorzolamide/Timolol BID OS             Last edited by Gisela Gordon, PCT on 3/6/2025  9:57 AM.            Assessment /Plan     For exam results, see Encounter Report.    1. Pseudoexfoliation (PXF) glaucoma, moderate stage   Primary open angle glaucoma (POAG) of right eye, mild stage  IOP at target OD, slightly elevated OS. Continue Rocklatan QHS OU, Cosopt BID OS Pt has upcoming appointment with Dr Boo. Continue per Dr Boo.     2. Myopia with astigmatism and presbyopia, bilateral  Contact Lens Final Rx       Final Contact Lens Rx         Brand Base Curve Diameter Sphere Cylinder Axis    Right Biofinity Toric 8.7 14.5 -3.50 -0.75 160    Left Biofinity Toric 8.7 14.5 -2.50 -0.75 150      Expiration Date: 3/6/2026    Replacement: Monthly    Solutions: OptiFree PureMoist    Wearing Schedule: Daily Wear              Final Contact Lens Rx #2         Brand Base Curve Diameter Sphere Cylinder Axis    Right Biofinity Toric 8.7 14.5 -3.50 -0.75 160    Left Biofinity Toric 8.7 14.5 -4.50 -0.75 150      Expiration Date: 3/6/2026    Replacement: Monthly    Solutions: OptiFree PureMoist    Wearing Schedule: Daily Wear              Final Contact Lens Rx Comments    Rx1- Monovision left eye near   Rx2- Distance both eyes                 Contact lens trials fitted in office today. Contact lens hygiene reviewed. Patient able to insert the lenses themselves with minimal difficulty. Patient ok to finalize Contact lens after 1 week of wear. RTC if still having difficulty with CTL trial after 1 week.      RTC with Dr. Boo as scheduled for IOP  check.  Discussed above and answered questions.

## 2025-03-16 ENCOUNTER — PATIENT MESSAGE (OUTPATIENT)
Dept: OPTOMETRY | Facility: CLINIC | Age: 65
End: 2025-03-16
Payer: COMMERCIAL

## 2025-03-18 ENCOUNTER — OFFICE VISIT (OUTPATIENT)
Dept: OPHTHALMOLOGY | Facility: CLINIC | Age: 65
End: 2025-03-18
Payer: COMMERCIAL

## 2025-03-18 DIAGNOSIS — H40.1493 PSEUDOEXFOLIATION (PXF) GLAUCOMA, SEVERE STAGE: Primary | ICD-10-CM

## 2025-03-18 DIAGNOSIS — H40.1111 PRIMARY OPEN ANGLE GLAUCOMA (POAG) OF RIGHT EYE, MILD STAGE: ICD-10-CM

## 2025-03-18 DIAGNOSIS — H04.129 DRY EYE: ICD-10-CM

## 2025-03-18 PROCEDURE — 99999 PR PBB SHADOW E&M-EST. PATIENT-LVL III: CPT | Mod: PBBFAC,,, | Performed by: OPHTHALMOLOGY

## 2025-03-18 RX ORDER — BALOXAVIR MARBOXIL 80 MG/1
1 TABLET, FILM COATED ORAL ONCE
COMMUNITY
Start: 2025-01-07

## 2025-03-18 RX ORDER — DORZOLAMIDE HYDROCHLORIDE AND TIMOLOL MALEATE PRESERVATIVE FREE 20; 5 MG/ML; MG/ML
1 SOLUTION/ DROPS OPHTHALMIC 2 TIMES DAILY
Qty: 60 EACH | Refills: 3 | Status: SHIPPED | OUTPATIENT
Start: 2025-03-18

## 2025-03-18 NOTE — PROGRESS NOTES
HPI     Glaucoma            Comments: 4 month IOP check    Patient states OS barnett constantly causing VA to appear blurry.          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 RE (seeing Dr. Michel 1/19/24 Lasik consult)  3. ERM OS    Rocklatan daily both eyes  Dorzolamide/Timolol 2x's per day left eye             Last edited by Yuli Judd, Patient Care Assistant on 3/18/2025  8:39   AM.            Assessment /Plan     For exam results, see Encounter Report.      ICD-10-CM ICD-9-CM    1. Pseudoexfoliation (PXF) glaucoma, moderate stage  H40.1492 365.52 IOP well controlled on current drops  However pt r/o dryness/redness, likely from drop use   Start Rocklatan OS only, and PF Cosopt OU    Visit today included increased complexity associated with the care and management of glaucoma. Patient aware that management of medical condition requires long term follow up.  Written instructions were placed in the portal for the patient upon closure of the encounter regarding specific use of the required medications.       365.72       2. Primary open angle glaucoma (POAG) of right eye, mild stage  H40.1111 365.11 See above      365.71       3. Dry eye  H04.129 375.15 Recommend Ivizia ATs PRN for dryness           Return to clinic 6-8 weeks for IOP and repeat HVF 24-2       Rocklatan daily- left eye  PF Dorzolamide/Timolol 2xs daily- both eyes

## 2025-05-20 ENCOUNTER — OFFICE VISIT (OUTPATIENT)
Dept: OPHTHALMOLOGY | Facility: CLINIC | Age: 65
End: 2025-05-20
Payer: COMMERCIAL

## 2025-05-20 DIAGNOSIS — H04.129 DRY EYE: ICD-10-CM

## 2025-05-20 DIAGNOSIS — H40.1111 PRIMARY OPEN ANGLE GLAUCOMA (POAG) OF RIGHT EYE, MILD STAGE: Primary | ICD-10-CM

## 2025-05-20 DIAGNOSIS — H40.1493 PSEUDOEXFOLIATION (PXF) GLAUCOMA, SEVERE STAGE: ICD-10-CM

## 2025-05-20 PROCEDURE — 1159F MED LIST DOCD IN RCRD: CPT | Mod: CPTII,S$GLB,, | Performed by: OPHTHALMOLOGY

## 2025-05-20 PROCEDURE — G2211 COMPLEX E/M VISIT ADD ON: HCPCS | Mod: S$GLB,,, | Performed by: OPHTHALMOLOGY

## 2025-05-20 PROCEDURE — 99214 OFFICE O/P EST MOD 30 MIN: CPT | Mod: S$GLB,,, | Performed by: OPHTHALMOLOGY

## 2025-05-20 PROCEDURE — 1160F RVW MEDS BY RX/DR IN RCRD: CPT | Mod: CPTII,S$GLB,, | Performed by: OPHTHALMOLOGY

## 2025-05-20 PROCEDURE — 99999 PR PBB SHADOW E&M-EST. PATIENT-LVL III: CPT | Mod: PBBFAC,,, | Performed by: OPHTHALMOLOGY

## 2025-05-20 NOTE — PROGRESS NOTES
HPI     Glaucoma            Comments: IOP check with repeat HVF  Unable to repeat HVF secondary to machine being down    Patient states no irritation from the new drops, will need to reschedule   HVF, machine is down and unable to do it today.          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 RE (seeing Dr. Michel 1/19/24 Lasik consult)  3. ERM OS      Rocklatan 1x daily- left eye  PF Dorzolamide/Timolol 2xs daily- both eyes             Last edited by Yuli Judd, Patient Care Assistant on 5/20/2025 11:05   AM.            Assessment /Plan     For exam results, see Encounter Report.      ICD-10-CM ICD-9-CM    1. Primary open angle glaucoma (POAG) of right eye, mild stage  H40.1111 365.11 IOP improved on PF Cosopt    Visit today included increased complexity associated with the care and management of glaucoma and cataract. Patient aware that management of medical condition requires long term follow up.  Written instructions were placed in the portal for the patient upon closure of the encounter regarding specific use of the required medications.     365.71       2. Pseudoexfoliation (PXF) glaucoma, severe stage  H40.1493 365.52 IOP improved on PF Cosopt and Rocklatan  Will continue Rocklatan OS due to low GCL     365.73       3. Dry eye  H04.129 375.15 Dryness improved with PF Cosopt use, continue           Return to clinic 4 months for DFE, HVF 24-2 & GOCT  Will evaluate cat OS (desires monovision)      Rocklatan 1x daily- left eye  PF Dorzolamide/Timolol 2xs daily- both eyes

## 2025-06-29 DIAGNOSIS — H40.1111 PRIMARY OPEN ANGLE GLAUCOMA (POAG) OF RIGHT EYE, MILD STAGE: ICD-10-CM

## 2025-06-29 DIAGNOSIS — H40.1493 PSEUDOEXFOLIATION (PXF) GLAUCOMA, SEVERE STAGE: ICD-10-CM

## 2025-06-30 RX ORDER — DORZOLAMIDE HYDROCHLORIDE AND TIMOLOL MALEATE PRESERVATIVE FREE 20; 5 MG/ML; MG/ML
SOLUTION/ DROPS OPHTHALMIC
Qty: 60 EACH | Refills: 0 | Status: SHIPPED | OUTPATIENT
Start: 2025-06-30